# Patient Record
Sex: MALE | Race: WHITE | NOT HISPANIC OR LATINO | Employment: OTHER | URBAN - METROPOLITAN AREA
[De-identification: names, ages, dates, MRNs, and addresses within clinical notes are randomized per-mention and may not be internally consistent; named-entity substitution may affect disease eponyms.]

---

## 2014-11-21 LAB — EJECTION FRACTION: NORMAL %

## 2017-01-11 ENCOUNTER — TRANSFERRED RECORDS (OUTPATIENT)
Dept: HEALTH INFORMATION MANAGEMENT | Facility: CLINIC | Age: 52
End: 2017-01-11

## 2017-04-10 DIAGNOSIS — I10 ESSENTIAL HYPERTENSION WITH GOAL BLOOD PRESSURE LESS THAN 140/90: ICD-10-CM

## 2017-04-10 DIAGNOSIS — R10.13 ABDOMINAL PAIN, EPIGASTRIC: ICD-10-CM

## 2017-04-10 RX ORDER — NICOTINE POLACRILEX 4 MG/1
20 GUM, CHEWING ORAL DAILY
Qty: 90 TABLET | Refills: 3 | Status: CANCELLED | OUTPATIENT
Start: 2017-04-10

## 2017-04-10 NOTE — TELEPHONE ENCOUNTER
Omeprazole  20 mg, # 90 with 3 refills last given 6/22/16  .  Doxazosin  4 mg, # 90 with 1 refills last given 12/14/2016.    Maddie Stone, CMA

## 2017-04-11 ENCOUNTER — TELEPHONE (OUTPATIENT)
Dept: FAMILY MEDICINE | Facility: CLINIC | Age: 52
End: 2017-04-11

## 2017-04-11 DIAGNOSIS — R10.13 ABDOMINAL PAIN, EPIGASTRIC: ICD-10-CM

## 2017-04-11 DIAGNOSIS — K21.9 GASTROESOPHAGEAL REFLUX DISEASE WITHOUT ESOPHAGITIS: Primary | ICD-10-CM

## 2017-04-11 NOTE — TELEPHONE ENCOUNTER
Omeprazole exceeds the 365 day max of #90.  Please complete a prior auth.  Ph: [phone] I.D. None given.  Niurka Salinas M.A.

## 2017-04-12 RX ORDER — NICOTINE POLACRILEX 4 MG/1
20 GUM, CHEWING ORAL DAILY
Qty: 90 TABLET | Refills: 0 | Status: SHIPPED | OUTPATIENT
Start: 2017-04-12 | End: 2017-06-13

## 2017-04-12 RX ORDER — DOXAZOSIN 4 MG/1
4 TABLET ORAL AT BEDTIME
Qty: 30 TABLET | Refills: 0 | Status: SHIPPED | OUTPATIENT
Start: 2017-04-12 | End: 2017-05-09

## 2017-04-12 NOTE — TELEPHONE ENCOUNTER
Notified Adriana of message below she will talk to patient and call me back to schedule./Joelle Hernandez,

## 2017-04-12 NOTE — TELEPHONE ENCOUNTER
No, the chart was review(ed) and the patient never followed up as Dr Mo told him to do to discuss the h pylori. Please have the patient make an appointment(s) to discuss this.  Hussein Sanches MD

## 2017-05-09 DIAGNOSIS — G43.809 OTHER TYPE OF MIGRAINE: ICD-10-CM

## 2017-05-09 DIAGNOSIS — I10 ESSENTIAL HYPERTENSION WITH GOAL BLOOD PRESSURE LESS THAN 140/90: ICD-10-CM

## 2017-05-09 DIAGNOSIS — E55.9 VITAMIN D DEFICIENCY DISEASE: ICD-10-CM

## 2017-05-10 RX ORDER — CHOLECALCIFEROL (VITAMIN D3) 50 MCG
2000 TABLET ORAL DAILY
Qty: 90 TABLET | Refills: 3 | Status: SHIPPED | OUTPATIENT
Start: 2017-05-10 | End: 2018-06-03

## 2017-05-10 RX ORDER — SUMATRIPTAN 50 MG/1
50 TABLET, FILM COATED ORAL
Qty: 27 TABLET | Refills: 3 | Status: SHIPPED | OUTPATIENT
Start: 2017-05-10 | End: 2018-07-07

## 2017-05-10 RX ORDER — DOXAZOSIN 4 MG/1
TABLET ORAL
Qty: 30 TABLET | Refills: 0 | Status: SHIPPED | OUTPATIENT
Start: 2017-05-10 | End: 2017-06-01

## 2017-06-01 DIAGNOSIS — I10 ESSENTIAL HYPERTENSION WITH GOAL BLOOD PRESSURE LESS THAN 140/90: ICD-10-CM

## 2017-06-01 DIAGNOSIS — R60.0 PEDAL EDEMA: ICD-10-CM

## 2017-06-01 NOTE — LETTER
[facility]                                               [address] [place]  [address]    June 2, 2017    Loy Worthington  [address] [address] [address]    Dear Loy,       We recently received a refill request for doxazosin, furosemide, metoprolol and lisinopril.  We have refilled this for a one time 30 day supply only because you are due for a:     Blood pressure office visit and fasting lab appointment        Please call at your earliest convenience so that there will not be a delay with your future refills.        Thank you,   Your [facility] Care Team/isabella  [phone]

## 2017-06-02 RX ORDER — LISINOPRIL 40 MG/1
40 TABLET ORAL DAILY
Qty: 30 TABLET | Refills: 0 | Status: SHIPPED | OUTPATIENT
Start: 2017-06-02 | End: 2017-06-13

## 2017-06-02 RX ORDER — DOXAZOSIN 4 MG/1
4 TABLET ORAL AT BEDTIME
Qty: 30 TABLET | Refills: 0 | Status: SHIPPED | OUTPATIENT
Start: 2017-06-02 | End: 2017-06-13

## 2017-06-02 RX ORDER — FUROSEMIDE 40 MG
40 TABLET ORAL EVERY MORNING
Qty: 30 TABLET | Refills: 0 | Status: SHIPPED | OUTPATIENT
Start: 2017-06-02 | End: 2017-06-13

## 2017-06-02 RX ORDER — METOPROLOL SUCCINATE 100 MG/1
100 TABLET, EXTENDED RELEASE ORAL DAILY
Qty: 30 TABLET | Refills: 0 | Status: SHIPPED | OUTPATIENT
Start: 2017-06-02 | End: 2017-06-13

## 2017-06-02 NOTE — TELEPHONE ENCOUNTER
Pt due for fasting lab appointment and ov for hypertension for further refills.  Laura Rodriguez RN

## 2017-06-13 ENCOUNTER — OFFICE VISIT (OUTPATIENT)
Dept: FAMILY MEDICINE | Facility: CLINIC | Age: 52
End: 2017-06-13

## 2017-06-13 VITALS
OXYGEN SATURATION: 97 % | HEART RATE: 66 BPM | BODY MASS INDEX: 38.8 KG/M2 | DIASTOLIC BLOOD PRESSURE: 83 MMHG | TEMPERATURE: 98.3 F | HEIGHT: 70 IN | WEIGHT: 271 LBS | SYSTOLIC BLOOD PRESSURE: 125 MMHG

## 2017-06-13 DIAGNOSIS — E55.9 VITAMIN D DEFICIENCY: Primary | ICD-10-CM

## 2017-06-13 DIAGNOSIS — K21.9 GASTROESOPHAGEAL REFLUX DISEASE, ESOPHAGITIS PRESENCE NOT SPECIFIED: ICD-10-CM

## 2017-06-13 DIAGNOSIS — R10.13 ABDOMINAL PAIN, EPIGASTRIC: ICD-10-CM

## 2017-06-13 DIAGNOSIS — E78.5 HYPERLIPIDEMIA LDL GOAL <130: ICD-10-CM

## 2017-06-13 DIAGNOSIS — R60.0 PEDAL EDEMA: ICD-10-CM

## 2017-06-13 DIAGNOSIS — I10 ESSENTIAL HYPERTENSION WITH GOAL BLOOD PRESSURE LESS THAN 140/90: ICD-10-CM

## 2017-06-13 PROCEDURE — 99214 OFFICE O/P EST MOD 30 MIN: CPT | Performed by: FAMILY MEDICINE

## 2017-06-13 RX ORDER — FUROSEMIDE 40 MG
40 TABLET ORAL EVERY MORNING
Qty: 90 TABLET | Refills: 1 | Status: SHIPPED | OUTPATIENT
Start: 2017-06-13 | End: 2017-06-13

## 2017-06-13 RX ORDER — DOXAZOSIN 4 MG/1
4 TABLET ORAL AT BEDTIME
Qty: 90 TABLET | Refills: 3 | Status: SHIPPED | OUTPATIENT
Start: 2017-06-13 | End: 2017-06-13

## 2017-06-13 RX ORDER — FUROSEMIDE 40 MG
40 TABLET ORAL EVERY MORNING
Qty: 90 TABLET | Refills: 1 | Status: SHIPPED | OUTPATIENT
Start: 2017-06-13 | End: 2018-01-08

## 2017-06-13 RX ORDER — OMEPRAZOLE 40 MG/1
40 CAPSULE, DELAYED RELEASE ORAL DAILY
Qty: 90 CAPSULE | Refills: 3 | Status: SHIPPED | OUTPATIENT
Start: 2017-06-13 | End: 2017-06-13

## 2017-06-13 RX ORDER — DOXAZOSIN 4 MG/1
4 TABLET ORAL AT BEDTIME
Qty: 90 TABLET | Refills: 3 | Status: SHIPPED | OUTPATIENT
Start: 2017-06-13 | End: 2018-01-08

## 2017-06-13 RX ORDER — OMEPRAZOLE 40 MG/1
40 CAPSULE, DELAYED RELEASE ORAL DAILY
Qty: 90 CAPSULE | Refills: 3 | Status: SHIPPED | OUTPATIENT
Start: 2017-06-13 | End: 2018-01-30

## 2017-06-13 RX ORDER — METOPROLOL SUCCINATE 100 MG/1
100 TABLET, EXTENDED RELEASE ORAL DAILY
Qty: 90 TABLET | Refills: 3 | Status: SHIPPED | OUTPATIENT
Start: 2017-06-13 | End: 2018-01-30

## 2017-06-13 RX ORDER — LISINOPRIL 40 MG/1
40 TABLET ORAL DAILY
Qty: 90 TABLET | Refills: 3 | Status: SHIPPED | OUTPATIENT
Start: 2017-06-13 | End: 2018-01-30

## 2017-06-13 RX ORDER — LISINOPRIL 40 MG/1
40 TABLET ORAL DAILY
Qty: 90 TABLET | Refills: 3 | Status: SHIPPED | OUTPATIENT
Start: 2017-06-13 | End: 2017-06-13

## 2017-06-13 RX ORDER — METOPROLOL SUCCINATE 100 MG/1
100 TABLET, EXTENDED RELEASE ORAL DAILY
Qty: 90 TABLET | Refills: 3 | Status: SHIPPED | OUTPATIENT
Start: 2017-06-13 | End: 2017-06-13

## 2017-06-13 NOTE — MR AVS SNAPSHOT
After Visit Summary   6/13/2017    Loy Worthington    MRN: [id number]           Patient Information     Date Of Birth          1965        Visit Information        Provider Department      6/13/2017 10:45 AM Hussein Sanches MD; [employer] [facility]        Today's Diagnoses     Vitamin D deficiency    -  1    Essential hypertension with goal blood pressure less than 140/90        Pedal edema        Abdominal pain, epigastric        Gastroesophageal reflux disease, esophagitis presence not specified        Hyperlipidemia LDL goal <130          Care Instructions    Please schedule a future laboratory appointment(s) and be sure to have fasted for 10 hours prior to arrival. Start taking your Vitamin D again and get the blood tests about a week after that.             Follow-ups after your visit        Follow-up notes from your care team     Return in about 1 month (around 7/13/2017) for recheck on Acid Reflux.      Future tests that were ordered for you today     Open Future Orders        Priority Expected Expires Ordered    25 Hydroxyvitamin D2 and D3 Routine 6/17/2017 8/13/2017 6/13/2017    Lipid panel reflex to direct LDL Routine 6/17/2017 8/13/2017 6/13/2017    Comprehensive metabolic panel Routine 6/17/2017 8/13/2017 6/13/2017            Who to contact     If you have questions or need follow up information about today's clinic visit or your schedule please contact [facility] directly at [phone].  Normal or non-critical lab and imaging results will be communicated to you by MyChart, letter or phone within 4 business days after the clinic has received the results. If you do not hear from us within 7 days, please contact the clinic through MyChart or phone. If you have a critical or abnormal lab result, we will notify you by phone as soon as possible.  Submit refill requests through [employer] or call your pharmacy and they will forward the refill  "request to us. Please allow 3 business days for your refill to be completed.          Additional Information About Your Visit        [employer] Information     [employer] lets you send messages to your doctor, view your test results, renew your prescriptions, schedule appointments and more. To sign up, go to [email or web address] . Click on \"Log in\" on the left side of the screen, which will take you to the Welcome page. Then click on \"Sign up Now\" on the right side of the page.     You will be asked to enter the access code listed below, as well as some personal information. Please follow the directions to create your username and password.     Your access code is: [email or web address]  Expires: 2017 11:54 AM     Your access code will  in 90 days. If you need help or a new code, please call your [place] clinic or [phone].        Care EveryWhere ID     This is your Care EveryWhere ID. This could be used by other organizations to access your [place] medical records  [id number]        Your Vitals Were     Pulse Temperature Height Pulse Oximetry BMI (Body Mass Index)       66 98.3  F (36.8  C) (Oral) 5' 10\" (1.778 m) 97% 38.88 kg/m2        Blood Pressure from Last 3 Encounters:   17 125/83   16 125/76   16 146/90    Weight from Last 3 Encounters:   17 271 lb (122.9 kg)   16 269 lb 6.4 oz (122.2 kg)   16 271 lb (122.9 kg)                 Today's Medication Changes          These changes are accurate as of: 17 11:54 AM.  If you have any questions, ask your nurse or doctor.               Start taking these medicines.        Dose/Directions    omeprazole 40 MG capsule   Commonly known as:  priLOSEC   Used for:  Abdominal pain, epigastric, Essential hypertension with goal blood pressure less than 140/90, Pedal edema   Replaces:  omeprazole 20 MG tablet   Started by:  Hussein Sanches MD        Dose:  40 mg   Take 1 capsule (40 mg) by mouth daily Take 30-60 minutes before a " meal.   Quantity:  90 capsule   Refills:  3         Stop taking these medicines if you haven't already. Please contact your care team if you have questions.     omeprazole 20 MG tablet   Replaced by:  omeprazole 40 MG capsule   Stopped by:  Hussein Sanches MD                Where to get your medicines      These medications were sent to [id number] - [place], [place] - [address] [address]  [address], [place] [address]    Hours:  24-hours Phone:  [phone]     doxazosin 4 MG tablet    furosemide 40 MG tablet    lisinopril 40 MG tablet    metoprolol 100 MG 24 hr tablet    omeprazole 40 MG capsule                Primary Care Provider Office Phone # Fax #    Hussein Sanches [phone]       [facility] [address] [address] [address]        Thank you!     Thank you for choosing [facility]  for your care. Our goal is always to provide you with excellent care. Hearing back from our patients is one way we can continue to improve our services. Please take a few minutes to complete the written survey that you may receive in the mail after your visit with us. Thank you!             Your Updated Medication List - Protect others around you: Learn how to safely use, store and throw away your medicines at www.disposemymeds.org.          This list is accurate as of: 6/13/17 11:54 AM.  Always use your most recent med list.                   Brand Name Dispense Instructions for use    Carboxymeth-Glycerin-Polysorb 0.5-1-0.5 % Soln    REFRESH OPTIVE ADVANCED     Apply 1 drop to eye 2 times daily       doxazosin 4 MG tablet    CARDURA    90 tablet    Take 1 tablet (4 mg) by mouth At Bedtime       flecainide 100 MG tablet    TAMBOCOR     Take 100 mg by mouth 2 times daily       furosemide 40 MG tablet    LASIX    90 tablet    Take 1 tablet (40 mg) by mouth every morning       lisinopril 40 MG tablet     PRINIVIL/ZESTRIL    90 tablet    Take 1 tablet (40 mg) by mouth daily       metoprolol 100 MG 24 hr tablet    TOPROL-XL    90 tablet    Take 1 tablet (100 mg) by mouth daily       MULTAQ 400 MG Tabs tablet   Generic drug:  dronedarone          omeprazole 40 MG capsule    priLOSEC    90 capsule    Take 1 capsule (40 mg) by mouth daily Take 30-60 minutes before a meal.       rivaroxaban ANTICOAGULANT 20 MG Tabs tablet    XARELTO    90 tablet    Take 1 tablet (20 mg) by mouth daily (with dinner)       SUMAtriptan 50 MG tablet    IMITREX    27 tablet    Take 1 tablet (50 mg) by mouth at onset of headache May repeat in 2 hours if needed: max 2/day;       vitamin D 2000 UNITS tablet     90 tablet    Take 2,000 Units by mouth daily

## 2017-06-13 NOTE — PROGRESS NOTES
SUBJECTIVE:                                                    Loy Worthington is a 52 year old male who presents to clinic today for the following health issues:        Recheck medications and refills  HTN      Problem list and histories reviewed & adjusted, as indicated.    Reviewed and updated as needed this visit by clinical staff  Tobacco  Allergies  Meds  Med Hx  Surg Hx  Fam Hx  Soc Hx      Reviewed and updated as needed this visit by Provider       --------------------------------------------------------------------------------------------------------------------------------------    SUBJECTIVE:  Loy Worthington is an 52 year old male who presents for a follow up evaluation of his hypertension. The patient had the dose of lisinopril  raised to 40 mg and the lassox was increaed byu his cardiology at the last visit. The patient reports that he IS taking the medication as prescribed. He denies side effects of medication.    Patient Active Problem List    Diagnosis     Brain injury (H)     Renal mass     Migraine headache     CARDIOVASCULAR SCREENING; LDL GOAL LESS THAN 130     Hypertension goal BP (blood pressure) < 140/90     Bilateral corneal scars     High triglycerides     Familial hypoalphalipoproteinemia     Migraine     Vitamin D deficiency     Hyperlipidemia LDL goal <130     Vitamin D deficiency disease     GERD (gastroesophageal reflux disease)     Tubular adenoma of colon     Paroxysmal atrial fibrillation (H)     Hematospermia     Lung nodule, solitary, Needs chest CT in Jan 2017     S/P laparoscopic cholecystectomy     Pulmonary nodule, right lung base needs fu scan     HDL deficiency       Is the HYPERTENSION goal on the problem list? Yes      Use of agents associated with hypertension: none  Current Outpatient Prescriptions   Medication     lisinopril (PRINIVIL/ZESTRIL) 40 MG tablet     furosemide (LASIX) 40 MG tablet     metoprolol (TOPROL-XL) 100 MG 24 hr tablet     doxazosin (CARDURA) 4 MG  "tablet     Cholecalciferol (VITAMIN D) 2000 UNITS tablet     SUMAtriptan (IMITREX) 50 MG tablet     omeprazole 20 MG tablet     flecainide (TAMBOCOR) 100 MG tablet     rivaroxaban ANTICOAGULANT (XARELTO) 20 MG TABS tablet     MULTAQ 400 MG TABS tablet     Carboxymeth-Glycerin-Polysorb (REFRESH OPTIVE ADVANCED) 0.5-1-0.5 % SOLN     No current facility-administered medications for this visit.          Allergies   Allergen Reactions     Zocor [Simvastatin - High Dose] Swelling     Redness,itching,swelling         Social History   Substance Use Topics     Smoking status: Never Smoker     Smokeless tobacco: Never Used      Comment: Lives in smoke free household     Alcohol use No       OBJECTIVE:  /83  Pulse 66  Temp 98.3  F (36.8  C) (Oral)  Ht 5' 10\" (1.778 m)  Wt 271 lb (122.9 kg)  SpO2 97%  BMI 38.88 kg/m2    Heart: negative, PMI normal. No lifts, heaves, or thrills. RRR. No murmurs, clicks gallops or rub  Lower Extremities: +1 edema on right and +1 edema on the left shelter up the shins.         Results for orders placed or performed in visit on 06/15/16   Lipid Profile with reflex to direct LDL   Result Value Ref Range    Cholesterol 142 <200 mg/dL    Triglycerides 239 (H) <150 mg/dL    HDL Cholesterol 32 (L) >39 mg/dL    LDL Cholesterol Calculated 62 <100 mg/dL    Non HDL Cholesterol 110 <130 mg/dL   Comprehensive metabolic panel   Result Value Ref Range    Sodium 140 133 - 144 mmol/L    Potassium 3.8 3.4 - 5.3 mmol/L    Chloride 106 94 - 109 mmol/L    Carbon Dioxide 29 20 - 32 mmol/L    Anion Gap 5 3 - 14 mmol/L    Glucose 102 (H) 70 - 99 mg/dL    Urea Nitrogen 12 7 - 30 mg/dL    Creatinine 0.76 0.66 - 1.25 mg/dL    GFR Estimate >90  Non  GFR Calc   >60 mL/min/1.7m2    GFR Estimate If Black >90   GFR Calc   >60 mL/min/1.7m2    Calcium 8.5 8.5 - 10.1 mg/dL    Bilirubin Total 0.5 0.2 - 1.3 mg/dL    Albumin 4.0 3.4 - 5.0 g/dL    Protein Total 7.7 6.8 - 8.8 g/dL    Alkaline " Phosphatase 80 40 - 150 U/L    ALT 46 0 - 70 U/L    AST 25 0 - 45 U/L   25 Hydroxyvitamin D2 and D3   Result Value Ref Range    25 OH Vit D2 <5 ug/L    25 OH Vit D3 36 ug/L    25 OH Vit D total  20 - 75 ug/L     <41  Season, race, dietary intake, and treatment affect the concentration of   25-hydroxy-Vitamin D. Values may decrease during winter months and increase   during summer months. Values 20-29 ug/L may indicate Vitamin D insufficiency   and values <20 ug/L may indicate Vitamin D deficiency.   This test was developed and its performance characteristics determined by the   [facility],  Special Chemistry Laboratory. It has   not been cleared or approved by the FDA. The laboratory is regulated under CLIA   as qualified to perform high-complexity testing. This test is used for clinical   purposes. It should not be regarded as investigational or for research.         The 10-year ASCVD risk score (Rulodesiree TALLEY Jr, et al., 2013) is: 5.2%    Values used to calculate the score:      Age: 52 years      Sex: Male      Is Non- : No      Diabetic: No      Tobacco smoker: No      Systolic Blood Pressure: 125 mmHg      Is BP treated: Yes      HDL Cholesterol: 32 mg/dL      Total Cholesterol: 142 mg/dL    ASSESSMENT:  Essential hypertension which is well controlled.       Plan:  - Medication:continue the current doses of medication.  The patients antihypertensive medication was filled for 6months.    The patient was advised to do the following therapuetic life style changes  - Dietary sodium restriction and increase potassium and Calcium intake  - Regular aerobic exercise  - Weight loss  - Discontinue smoking if applicable  - Avoid regular NSAID use if applicable  - Avoid regular decongestant use if applicable  - Follow up in clinic in 6 months for a recheck  - Check a basic metabolic panel today    Patient Education: Reviewed risks of hypertension and principles of    Treatment.      --------------------------------------------------------------------------------------------------------------------------------------    SUBJECTIVE:  HPI: Loy Worthington is a 52 year old male who presents for follow up on symptoms of Acid reflux. The patient was started on 20 mg of omeprazole *. The patient has been taking the medication as prescribed. The patient denies any side effects from the medication.   Overall the patient reports that the symptoms are not well controlled.   He has a few days in a row  of symptom(s) in which he gets heartburn and nausea,   It bothers him 3-4 times a month(s).     Patient Active Problem List 06/13/2017 - Damian as Reviewed 06/13/2017   -- ZOCOR [SIMVASTATIN - HIGH DOSE] -- Swelling -- noted 07/22/2013      Current Outpatient Prescriptions:      lisinopril (PRINIVIL/ZESTRIL) 40 MG tablet, Take 1 tablet (40 mg) by mouth daily, Disp: 30 tablet, Rfl: 0     furosemide (LASIX) 40 MG tablet, Take 1 tablet (40 mg) by mouth every morning, Disp: 30 tablet, Rfl: 0     metoprolol (TOPROL-XL) 100 MG 24 hr tablet, Take 1 tablet (100 mg) by mouth daily, Disp: 30 tablet, Rfl: 0     doxazosin (CARDURA) 4 MG tablet, Take 1 tablet (4 mg) by mouth At Bedtime, Disp: 30 tablet, Rfl: 0     Cholecalciferol (VITAMIN D) 2000 UNITS tablet, Take 2,000 Units by mouth daily, Disp: 90 tablet, Rfl: 3     SUMAtriptan (IMITREX) 50 MG tablet, Take 1 tablet (50 mg) by mouth at onset of headache May repeat in 2 hours if needed: max 2/day;, Disp: 27 tablet, Rfl: 3     omeprazole 20 MG tablet, Take 1 tablet (20 mg) by mouth daily Take 30-60 minutes before a meal., Disp: 90 tablet, Rfl: 0     flecainide (TAMBOCOR) 100 MG tablet, Take 100 mg by mouth 2 times daily, Disp: , Rfl:      rivaroxaban ANTICOAGULANT (XARELTO) 20 MG TABS tablet, Take 1 tablet (20 mg) by mouth daily (with dinner), Disp: 90 tablet, Rfl: 1     MULTAQ 400 MG TABS tablet, , Disp: , Rfl:      Carboxymeth-Glycerin-Polysorb (REFRESH  "OPTIVE ADVANCED) 0.5-1-0.5 % SOLN, Apply 1 drop to eye 2 times daily, Disp: , Rfl:   Patient Active Problem List   Diagnosis     Brain injury (H)     Renal mass     Migraine headache     CARDIOVASCULAR SCREENING; LDL GOAL LESS THAN 130     Hypertension goal BP (blood pressure) < 140/90     Bilateral corneal scars     High triglycerides     Familial hypoalphalipoproteinemia     Migraine     Vitamin D deficiency     Hyperlipidemia LDL goal <130     Vitamin D deficiency disease     GERD (gastroesophageal reflux disease)     Tubular adenoma of colon     Paroxysmal atrial fibrillation (H)     Hematospermia     Lung nodule, solitary, Needs chest CT in Jan 2017     S/P laparoscopic cholecystectomy     Pulmonary nodule, right lung base needs fu scan     HDL deficiency         PHYSICAL EXAM:  /83  Pulse 66  Temp 98.3  F (36.8  C) (Oral)  Ht 5' 10\" (1.778 m)  Wt 271 lb (122.9 kg)  SpO2 97%  BMI 38.88 kg/m2   General: healthy, alert and no distress  HENT: Normocephalic. TM's grossly normal, oropharynx without significant findings.  Neck: supple, without thyromegaly or thyroid nodularity and without cervical or jugular adenopathy  Lungs: Clear to auscultation  Heart:NEGATIVE, PMI normal. No lifts, heaves, or thrills. RRR. No murmurs, clicks gallops or rub  Abdomen: bowel sounds normal,  no bruits heard, no organomegaly and soft, non-tender    ASSESSMENT / IMPRESSION:  1)Gastroesophageal Reflux Disease      PLAN:  Increase the dose of the omeprazole to 40 mg and follow up in 1-2 month(s)     Continue the current medication.  Continue current life style changes including: eat smaller, more frequent meals, last meal at least 3 hours before bedtime, avoid chocolate, citrus, alcohol, caffeine, and peppermint and if obese, lose weight  I did recommend that the patient make sure to get the recommended daily dose of calcium either through dietary sources or through supplement to compensate for the decreased calcium " "absorption seen in patients on long term PPI's.       --------------------------------------------------------------------------------------------------------------------------------------      SUBJECTIVE:  Loy Worthington is an 52 year old male who presents for a follow up evaluation of his Vitamin D deficiency.    He is takingt 2,000 iu daily but he has missed it for about 1 week..         Past Medical History:   Diagnosis Date     Arthritis     hand- joint pain     Bilateral corneal scars 4/7/2011     Brain injury (H)     fall from a great height     Cognitive deficits      Hematuria 03/19/2009     Hypercholesterolemia      Hypertension      Low back pain 03/19/2009     Pain in joint, forearm 11/11/2008     Renal cyst 04/06/2009     Trigger finger (acquired) 11/11/2008    right long       Current Outpatient Prescriptions   Medication     lisinopril (PRINIVIL/ZESTRIL) 40 MG tablet     furosemide (LASIX) 40 MG tablet     metoprolol (TOPROL-XL) 100 MG 24 hr tablet     doxazosin (CARDURA) 4 MG tablet     Cholecalciferol (VITAMIN D) 2000 UNITS tablet     SUMAtriptan (IMITREX) 50 MG tablet     omeprazole 20 MG tablet     flecainide (TAMBOCOR) 100 MG tablet     rivaroxaban ANTICOAGULANT (XARELTO) 20 MG TABS tablet     MULTAQ 400 MG TABS tablet     Carboxymeth-Glycerin-Polysorb (REFRESH OPTIVE ADVANCED) 0.5-1-0.5 % SOLN     No current facility-administered medications for this visit.        Allergies   Allergen Reactions     Zocor [Simvastatin - High Dose] Swelling     Redness,itching,swelling         Social History   Substance Use Topics     Smoking status: Never Smoker     Smokeless tobacco: Never Used      Comment: Lives in smoke free household     Alcohol use No       OBJECTIVE:  /83  Pulse 66  Temp 98.3  F (36.8  C) (Oral)  Ht 5' 10\" (1.778 m)  Wt 271 lb (122.9 kg)  SpO2 97%  BMI 38.88 kg/m2    Exam:  Constitutional: healthy, alert and no distress  Skin Color: white      ASSESSMENT:  Vitamin D " deficiency    Plan:  Patient Instructions   Please schedule a future laboratory appointment(s) and be sure to have fasted for 10 hours prior to arrival. Start taking your Vitamin D again and get the blood tests about a week after that.

## 2017-06-13 NOTE — PATIENT INSTRUCTIONS
Please schedule a future laboratory appointment(s) and be sure to have fasted for 10 hours prior to arrival. Start taking your Vitamin D again and get the blood tests about a week after that.

## 2017-06-13 NOTE — NURSING NOTE
"Chief Complaint   Patient presents with     Recheck Medication       Initial /87  Pulse 66  Temp 98.3  F (36.8  C) (Oral)  Ht 5' 10\" (1.778 m)  Wt 271 lb (122.9 kg)  SpO2 97%  BMI 38.88 kg/m2 Estimated body mass index is 38.88 kg/(m^2) as calculated from the following:    Height as of this encounter: 5' 10\" (1.778 m).    Weight as of this encounter: 271 lb (122.9 kg).  Medication Reconciliation: complete  Annamaria Mcdaniel M.A.    "

## 2017-12-28 ENCOUNTER — TELEPHONE (OUTPATIENT)
Dept: FAMILY MEDICINE | Facility: CLINIC | Age: 52
End: 2017-12-28

## 2017-12-28 DIAGNOSIS — I10 ESSENTIAL HYPERTENSION WITH GOAL BLOOD PRESSURE LESS THAN 140/90: ICD-10-CM

## 2017-12-28 DIAGNOSIS — R60.0 PEDAL EDEMA: ICD-10-CM

## 2017-12-28 DIAGNOSIS — E78.6 HDL DEFICIENCY: Primary | ICD-10-CM

## 2017-12-28 DIAGNOSIS — R10.13 ABDOMINAL PAIN, EPIGASTRIC: ICD-10-CM

## 2017-12-28 DIAGNOSIS — E55.9 VITAMIN D DEFICIENCY: ICD-10-CM

## 2017-12-28 DIAGNOSIS — I10 HYPERTENSION GOAL BP (BLOOD PRESSURE) < 140/90: ICD-10-CM

## 2017-12-28 DIAGNOSIS — E78.1 HIGH TRIGLYCERIDES: ICD-10-CM

## 2017-12-28 DIAGNOSIS — K21.9 GASTROESOPHAGEAL REFLUX DISEASE WITHOUT ESOPHAGITIS: ICD-10-CM

## 2018-01-02 NOTE — TELEPHONE ENCOUNTER
Routing refill request to provider for review/approval because:  Labs not current:  Potassium, creatinine, sodium.  BP Readings from Last 6 Encounters:   06/13/17 125/83   06/22/16 125/76   06/01/16 146/90   02/19/16 138/84   01/29/16 109/71   01/25/16 (!) 133/97     Please advise on refill.  Dalia Guevara RN

## 2018-01-03 NOTE — TELEPHONE ENCOUNTER
Please call the patient and make an appointment(s) for a complete physical exam and then you can refill the medication one time.  Hussein Sanches

## 2018-01-04 RX ORDER — FUROSEMIDE 40 MG
TABLET ORAL
OUTPATIENT
Start: 2018-01-04

## 2018-01-04 RX ORDER — DOXAZOSIN 4 MG/1
TABLET ORAL
OUTPATIENT
Start: 2018-01-04

## 2018-01-05 NOTE — TELEPHONE ENCOUNTER
Patient does not speak english well. Spoke with Son and informed him patient needs to make an appointment for Physical for Refill of medication.   ANA Florence/Veronika

## 2018-01-08 ENCOUNTER — TELEPHONE (OUTPATIENT)
Dept: FAMILY MEDICINE | Facility: CLINIC | Age: 53
End: 2018-01-08

## 2018-01-08 DIAGNOSIS — R60.0 PEDAL EDEMA: ICD-10-CM

## 2018-01-08 DIAGNOSIS — I10 ESSENTIAL HYPERTENSION WITH GOAL BLOOD PRESSURE LESS THAN 140/90: ICD-10-CM

## 2018-01-08 DIAGNOSIS — R10.13 ABDOMINAL PAIN, EPIGASTRIC: ICD-10-CM

## 2018-01-08 RX ORDER — FUROSEMIDE 40 MG
40 TABLET ORAL EVERY MORNING
Qty: 30 TABLET | Refills: 0 | Status: SHIPPED | OUTPATIENT
Start: 2018-01-08 | End: 2018-01-30

## 2018-01-08 RX ORDER — DOXAZOSIN 4 MG/1
4 TABLET ORAL AT BEDTIME
Qty: 30 TABLET | Refills: 0 | Status: SHIPPED | OUTPATIENT
Start: 2018-01-08 | End: 2018-01-30

## 2018-01-08 NOTE — TELEPHONE ENCOUNTER
30 day prescription sent to pharmacy.  Need to keep upcoming appointment for further refills  Dalia Guevara RN

## 2018-01-08 NOTE — TELEPHONE ENCOUNTER
RN please refill medications requested He has appointments scheduled now,see previous telephone message  ANA Florence/Veronika

## 2018-01-08 NOTE — TELEPHONE ENCOUNTER
calling patient has scheduled an appt for lab work on 1/22 and phy with Dr Sanches on 1/30. Patient out of his medication states all meds, needs refills called in to get patient by until seen. States ok to call number listed daughter can interpret for patient. Please call to advise.

## 2018-01-22 DIAGNOSIS — E78.6 HDL DEFICIENCY: ICD-10-CM

## 2018-01-22 DIAGNOSIS — E55.9 VITAMIN D DEFICIENCY: ICD-10-CM

## 2018-01-22 DIAGNOSIS — I10 HYPERTENSION GOAL BP (BLOOD PRESSURE) < 140/90: ICD-10-CM

## 2018-01-22 DIAGNOSIS — E78.1 HIGH TRIGLYCERIDES: ICD-10-CM

## 2018-01-22 DIAGNOSIS — R60.0 PEDAL EDEMA: ICD-10-CM

## 2018-01-22 DIAGNOSIS — R10.13 ABDOMINAL PAIN, EPIGASTRIC: ICD-10-CM

## 2018-01-22 DIAGNOSIS — I10 ESSENTIAL HYPERTENSION WITH GOAL BLOOD PRESSURE LESS THAN 140/90: ICD-10-CM

## 2018-01-22 DIAGNOSIS — K21.9 GASTROESOPHAGEAL REFLUX DISEASE WITHOUT ESOPHAGITIS: ICD-10-CM

## 2018-01-22 LAB
ALBUMIN SERPL-MCNC: 3.7 G/DL (ref 3.4–5)
ALP SERPL-CCNC: 79 U/L (ref 40–150)
ALT SERPL W P-5'-P-CCNC: 69 U/L (ref 0–70)
ANION GAP SERPL CALCULATED.3IONS-SCNC: 8 MMOL/L (ref 3–14)
AST SERPL W P-5'-P-CCNC: 30 U/L (ref 0–45)
BILIRUB SERPL-MCNC: 0.4 MG/DL (ref 0.2–1.3)
BUN SERPL-MCNC: 13 MG/DL (ref 7–30)
CALCIUM SERPL-MCNC: 8.7 MG/DL (ref 8.5–10.1)
CHLORIDE SERPL-SCNC: 110 MMOL/L (ref 94–109)
CHOLEST SERPL-MCNC: 164 MG/DL
CO2 SERPL-SCNC: 26 MMOL/L (ref 20–32)
CREAT SERPL-MCNC: 0.83 MG/DL (ref 0.66–1.25)
GFR SERPL CREATININE-BSD FRML MDRD: >90 ML/MIN/1.7M2
GLUCOSE SERPL-MCNC: 101 MG/DL (ref 70–99)
HDLC SERPL-MCNC: 35 MG/DL
LDLC SERPL CALC-MCNC: 92 MG/DL
NONHDLC SERPL-MCNC: 129 MG/DL
POTASSIUM SERPL-SCNC: 4.2 MMOL/L (ref 3.4–5.3)
PROT SERPL-MCNC: 7.5 G/DL (ref 6.8–8.8)
SODIUM SERPL-SCNC: 144 MMOL/L (ref 133–144)
TRIGL SERPL-MCNC: 184 MG/DL

## 2018-01-22 PROCEDURE — 82306 VITAMIN D 25 HYDROXY: CPT | Performed by: FAMILY MEDICINE

## 2018-01-22 PROCEDURE — 80061 LIPID PANEL: CPT | Performed by: FAMILY MEDICINE

## 2018-01-22 PROCEDURE — 36415 COLL VENOUS BLD VENIPUNCTURE: CPT | Performed by: FAMILY MEDICINE

## 2018-01-22 PROCEDURE — 80053 COMPREHEN METABOLIC PANEL: CPT | Performed by: FAMILY MEDICINE

## 2018-01-22 NOTE — PROGRESS NOTES
I have reviewed the schedule of future appointments and this patient has an appointment scheduled for 1-.    Visit date not found

## 2018-01-25 LAB
DEPRECATED CALCIDIOL+CALCIFEROL SERPL-MC: <36 UG/L (ref 20–75)
VITAMIN D2 SERPL-MCNC: <5 UG/L
VITAMIN D3 SERPL-MCNC: 31 UG/L

## 2018-01-30 ENCOUNTER — OFFICE VISIT (OUTPATIENT)
Dept: FAMILY MEDICINE | Facility: CLINIC | Age: 53
End: 2018-01-30
Payer: COMMERCIAL

## 2018-01-30 VITALS
DIASTOLIC BLOOD PRESSURE: 94 MMHG | SYSTOLIC BLOOD PRESSURE: 145 MMHG | TEMPERATURE: 98.7 F | HEIGHT: 70 IN | HEART RATE: 80 BPM | OXYGEN SATURATION: 97 % | BODY MASS INDEX: 39.22 KG/M2 | WEIGHT: 274 LBS

## 2018-01-30 DIAGNOSIS — I10 ESSENTIAL HYPERTENSION WITH GOAL BLOOD PRESSURE LESS THAN 140/90: ICD-10-CM

## 2018-01-30 DIAGNOSIS — R10.13 ABDOMINAL PAIN, EPIGASTRIC: ICD-10-CM

## 2018-01-30 DIAGNOSIS — Z80.42 FAMILY HISTORY OF PROSTATE CANCER IN FATHER: ICD-10-CM

## 2018-01-30 DIAGNOSIS — R10.13 EPIGASTRIC PAIN: ICD-10-CM

## 2018-01-30 DIAGNOSIS — I48.0 PAROXYSMAL ATRIAL FIBRILLATION (H): ICD-10-CM

## 2018-01-30 DIAGNOSIS — Z12.11 SPECIAL SCREENING FOR MALIGNANT NEOPLASMS, COLON: ICD-10-CM

## 2018-01-30 DIAGNOSIS — R60.0 PEDAL EDEMA: ICD-10-CM

## 2018-01-30 DIAGNOSIS — Z00.00 ROUTINE GENERAL MEDICAL EXAMINATION AT A HEALTH CARE FACILITY: Primary | ICD-10-CM

## 2018-01-30 LAB
ALBUMIN SERPL-MCNC: 4.1 G/DL (ref 3.4–5)
ALP SERPL-CCNC: 97 U/L (ref 40–150)
ALT SERPL W P-5'-P-CCNC: 64 U/L (ref 0–70)
AMYLASE SERPL-CCNC: 48 U/L (ref 30–110)
AST SERPL W P-5'-P-CCNC: 31 U/L (ref 0–45)
BILIRUB DIRECT SERPL-MCNC: 0.1 MG/DL (ref 0–0.2)
BILIRUB SERPL-MCNC: 0.5 MG/DL (ref 0.2–1.3)
ERYTHROCYTE [DISTWIDTH] IN BLOOD BY AUTOMATED COUNT: 14.1 % (ref 10–15)
HCT VFR BLD AUTO: 45.9 % (ref 40–53)
HGB BLD-MCNC: 15.3 G/DL (ref 13.3–17.7)
LIPASE SERPL-CCNC: 152 U/L (ref 73–393)
MCH RBC QN AUTO: 27 PG (ref 26.5–33)
MCHC RBC AUTO-ENTMCNC: 33.3 G/DL (ref 31.5–36.5)
MCV RBC AUTO: 81 FL (ref 78–100)
PLATELET # BLD AUTO: 174 10E9/L (ref 150–450)
PROT SERPL-MCNC: 8.3 G/DL (ref 6.8–8.8)
PSA SERPL-ACNC: 1.86 UG/L (ref 0–4)
RBC # BLD AUTO: 5.66 10E12/L (ref 4.4–5.9)
WBC # BLD AUTO: 4.9 10E9/L (ref 4–11)

## 2018-01-30 PROCEDURE — 99396 PREV VISIT EST AGE 40-64: CPT | Performed by: FAMILY MEDICINE

## 2018-01-30 PROCEDURE — 80076 HEPATIC FUNCTION PANEL: CPT | Performed by: FAMILY MEDICINE

## 2018-01-30 PROCEDURE — G0103 PSA SCREENING: HCPCS | Performed by: FAMILY MEDICINE

## 2018-01-30 PROCEDURE — 36415 COLL VENOUS BLD VENIPUNCTURE: CPT | Performed by: FAMILY MEDICINE

## 2018-01-30 PROCEDURE — 82150 ASSAY OF AMYLASE: CPT | Performed by: FAMILY MEDICINE

## 2018-01-30 PROCEDURE — 85027 COMPLETE CBC AUTOMATED: CPT | Performed by: FAMILY MEDICINE

## 2018-01-30 PROCEDURE — 83690 ASSAY OF LIPASE: CPT | Performed by: FAMILY MEDICINE

## 2018-01-30 RX ORDER — DOXAZOSIN 4 MG/1
4 TABLET ORAL AT BEDTIME
Qty: 90 TABLET | Refills: 3 | Status: SHIPPED | OUTPATIENT
Start: 2018-01-30 | End: 2019-02-07

## 2018-01-30 RX ORDER — LISINOPRIL 40 MG/1
40 TABLET ORAL DAILY
Qty: 90 TABLET | Refills: 3 | Status: SHIPPED | OUTPATIENT
Start: 2018-01-30 | End: 2019-02-07

## 2018-01-30 RX ORDER — METOPROLOL SUCCINATE 100 MG/1
100 TABLET, EXTENDED RELEASE ORAL DAILY
Qty: 90 TABLET | Refills: 3 | Status: SHIPPED | OUTPATIENT
Start: 2018-01-30 | End: 2019-02-07

## 2018-01-30 RX ORDER — FUROSEMIDE 40 MG
40 TABLET ORAL EVERY MORNING
Qty: 90 TABLET | Refills: 3 | Status: SHIPPED | OUTPATIENT
Start: 2018-01-30 | End: 2019-02-07

## 2018-01-30 RX ORDER — OMEPRAZOLE 40 MG/1
40 CAPSULE, DELAYED RELEASE ORAL DAILY
Qty: 90 CAPSULE | Refills: 3 | Status: SHIPPED | OUTPATIENT
Start: 2018-01-30 | End: 2019-02-07

## 2018-01-30 NOTE — PROGRESS NOTES
"SUBJECTIVE:   CC: Loy Worthington is an 52 year old male who presents for preventative health visit.     Physical   Annual:     Getting at least 3 servings of Calcium per day::  Yes    Bi-annual eye exam::  NO    Dental care twice a year::  Yes    Sleep apnea or symptoms of sleep apnea::  Excessive snoring    Diet::  Regular (no restrictions), Low salt and Low fat/cholesterol    Taking medications regularly::  No    Barriers to taking medications::  Not applicable    Additional concerns today::  No                    Today's PHQ-2 Score:   PHQ-2 ( 1999 [employer]) 1/30/2018   Q1: Little interest or pleasure in doing things 0   Q2: Feeling down, depressed or hopeless 0   PHQ-2 Score 0   Q1: Little interest or pleasure in doing things Not at all   Q2: Feeling down, depressed or hopeless Not at all   PHQ-2 Score 0       Abuse: Current or Past(Physical, Sexual or Emotional)- No  Do you feel safe in your environment - Yes    Social History   Substance Use Topics     Smoking status: Never Smoker     Smokeless tobacco: Never Used      Comment: Lives in smoke free household     Alcohol use No     Alcohol Use 1/30/2018   If you drink alcohol, do you typically have greater than 3 drinks per day OR greater than 7 drinks per week?   Not applicable   No flowsheet data found.    Last PSA:   PSA   Date Value Ref Range Status   01/30/2018 1.86 0 - 4 ug/L Final     Comment:     Assay Method:  Chemiluminescence using Siemens Vista analyzer       Reviewed orders with patient. Reviewed health maintenance and updated orders accordingly -       Reviewed and updated as needed this visit by clinical staff  Tobacco  Allergies  Meds  Med Hx  Surg Hx  Fam Hx  Soc Hx        Reviewed and updated as needed this visit by Provider            Review of Systems      OBJECTIVE:   BP (!) 145/94  Pulse 80  Temp 98.7  F (37.1  C) (Oral)  Ht 5' 10\" (1.778 m)  Wt 274 lb (124.3 kg)  SpO2 97%  BMI 39.31 kg/m2    Physical Exam      ASSESSMENT/PLAN:      " " ICD-10-CM    1. Routine general medical examination at a health care facility Z00.00    2. Pedal edema R60.0 furosemide (LASIX) 40 MG tablet     doxazosin (CARDURA) 4 MG tablet     metoprolol succinate (TOPROL-XL) 100 MG 24 hr tablet     lisinopril (PRINIVIL/ZESTRIL) 40 MG tablet     omeprazole (PRILOSEC) 40 MG capsule   3. Essential hypertension with goal blood pressure less than 140/90 I10 furosemide (LASIX) 40 MG tablet     doxazosin (CARDURA) 4 MG tablet     metoprolol succinate (TOPROL-XL) 100 MG 24 hr tablet     lisinopril (PRINIVIL/ZESTRIL) 40 MG tablet     omeprazole (PRILOSEC) 40 MG capsule   4. Abdominal pain, epigastric R10.13 furosemide (LASIX) 40 MG tablet     doxazosin (CARDURA) 4 MG tablet     metoprolol succinate (TOPROL-XL) 100 MG 24 hr tablet     lisinopril (PRINIVIL/ZESTRIL) 40 MG tablet     omeprazole (PRILOSEC) 40 MG capsule   5. Paroxysmal atrial fibrillation (H) I48.0    6. Epigastric pain R10.13 H Pylori antigen stool     Amylase     CBC with platelets     Hepatic panel     Lipase   7. Special screening for malignant neoplasms, colon Z12.11 GASTROENTEROLOGY ADULT REF PROCEDURE ONLY   8. Family history of prostate cancer in father Z80.42 Prostate spec antigen screen       COUNSELING:   Reviewed preventive health counseling, as reflected in patient instructions       Regular exercise       Healthy diet/nutrition       Vision screening       Family planning       Consider Hep C screening for patients born between 1945 and 1965       Colon cancer screening       Osteoporosis Prevention/Bone Health         reports that he has never smoked. He has never used smokeless tobacco.    Estimated body mass index is 39.31 kg/(m^2) as calculated from the following:    Height as of this encounter: 5' 10\" (1.778 m).    Weight as of this encounter: 274 lb (124.3 kg).   Weight management plan: Discussed healthy diet and exercise guidelines and patient will follow up in 12 months in clinic to " re-evaluate.    Counseling Resources:  ATP IV Guidelines  Pooled Cohorts Equation Calculator  FRAX Risk Assessment  ICSI Preventive Guidelines  Dietary Guidelines for Americans, 2010  USDA's MyPlate  ASA Prophylaxis  Lung CA Screening    Hussein Sanches MD  [facility]  Answers for HPI/ROS submitted by the patient on 1/30/2018   PHQ-2 Score: 0  --------------------------------------------------------------------------------------------------------------------------------------    SUBJECTIVE:  Loy Worthington is a 52 year old male who presents to the clinic today for a routine physical exam.    The patient's last physical was 4 years ago.     Cholesterol   Date Value Ref Range Status   01/22/2018 164 <200 mg/dL Final   06/15/2016 142 <200 mg/dL Final     HDL Cholesterol   Date Value Ref Range Status   01/22/2018 35 (L) >39 mg/dL Final   06/15/2016 32 (L) >39 mg/dL Final     LDL Cholesterol Calculated   Date Value Ref Range Status   01/22/2018 92 <100 mg/dL Final     Comment:     Desirable:       <100 mg/dl   06/15/2016 62 <100 mg/dL Final     Comment:     Desirable:       <100 mg/dl     Triglycerides   Date Value Ref Range Status   01/22/2018 184 (H) <150 mg/dL Final     Comment:     Borderline high:  150-199 mg/dl  High:             200-499 mg/dl  Very high:       >499 mg/dl  Fasting specimen     06/15/2016 239 (H) <150 mg/dL Final     Comment:     Borderline high:  150-199 mg/dl   High:             200-499 mg/dl   Very high:       >499 mg/dl   Fasting specimen       Cholesterol/HDL Ratio   Date Value Ref Range Status   04/08/2015 5.3 (H) 0.0 - 5.0 Final   09/18/2014 5.4 (H) 0.0 - 5.0 Final     The patient's last fasting lipid panel was done 12 days ago and the results are listed above.    He is working out at [employer] but not frequently right now as he is ice fishing frequently.  He is avoiding simple carbs           The 10-year ASCVD risk score ([place] GERRI Jr, et al., 2013) is: 7.4%    Values used to  calculate the score:      Age: 52 years      Sex: Male      Is Non- : No      Diabetic: No      Tobacco smoker: No      Systolic Blood Pressure: 145 mmHg      Is BP treated: Yes      HDL Cholesterol: 35 mg/dL      Total Cholesterol: 164 mg/dL        The patient reports that he has been treated for high blood pressure. He got some bad news last night and I rather upset. He did not sleep well and is worried that may be the cause of his elevated blood pressure     The patient reports that he does not  take a daily aspirin as he takes Xarelto. He sees cardiology and has an appointment(s) in one month(s)     Lab Results   Component Value Date    HCVAB Negative 03/18/2014     The patient reports that he has been screened for Hepatitis C    (Screen all baby boomers once per CDC-- the generation born from 1945 through 1965)    Immunization History   Administered Date(s) Administered     HepB 10/15/2001, 11/15/2001, 11/28/2012     Influenza (IIV3) PF 11/10/2008     Mantoux Tuberculin Skin Test 05/15/1986, 10/15/2001     TD (ADULT, 7+) 09/25/2001, 11/15/2001     TDAP Vaccine (Adacel) 09/10/2012     The patient's believes that his last tetanus shot was given 6 year(s) ago.   The patient believes that he has not had a Zostavax in the past  The patient believes that he has not had a PPSV23 in the past.  The patient believes that he has not had a PCV13 in the past.  The patient believes that he has not had a seasonal flu vaccination this fall or winter.  The patient would like to have a no vaccinations today      No results found for this or any previous visit.]   The patient denies a family history of colon cancer.  The patient reports that he has had a colonoscopy. His  last colonoscopy was in 9-2013 and he  report that is was abnormal. The patient was told to have this repeated in 5 years.      He is not using contraception. His wife is his age and he is not concerned. He is not interested in a  vasectomy in the near future.    The patient denies a family history of diabetes.  The patient reports a family history of prostate cancer in his father.  The patient reports that he eats or drinks 2-3 servings of dairy products per day. He does not take a calcium supplement at all.  The patient reports that he has dental appointments approximately every 6 months.  The patient reports that he  has an eye examination approximately every 2 year(s).    Do you currently smoke? No  How many years have you smoked? 0   How many packs per day did you smoke on average? N/A  (if more than 30 pack year history and the patient is age 55-80 consider ordering an annual low dose radiation lung CT to screen for cancer)  (Do not order if patient has quit more than 15 years ago or has a health condition that limits life expectancy or could not tolerate curative lung surgery)  Are you interested having a lung CT to screen for lung cancer? N/A    If the patient has smoked more that 100 cigarettes, has the patient had an imaging study (US or CT) for an AAA between the ages of 65 and 75? N/A            Patient Active Problem List   Diagnosis     Brain injury (H)     Renal mass     Migraine headache     CARDIOVASCULAR SCREENING; LDL GOAL LESS THAN 130     Hypertension goal BP (blood pressure) < 140/90     Bilateral corneal scars     High triglycerides     Familial hypoalphalipoproteinemia     Migraine     Vitamin D deficiency     Hyperlipidemia LDL goal <130     Vitamin D deficiency disease     GERD (gastroesophageal reflux disease)     Tubular adenoma of colon     Paroxysmal atrial fibrillation (H)     Hematospermia     Lung nodule, solitary, Needs chest CT in Jan 2017     S/P laparoscopic cholecystectomy     Pulmonary nodule, right lung base needs fu scan     HDL deficiency       Past Surgical History:   Procedure Laterality Date     CHOLECYSTECTOMY       ENDOSCOPY  12/12/2005    upper GI endoscopy     ESOPHAGOSCOPY, GASTROSCOPY,  DUODENOSCOPY (EGD), COMBINED N/A 1/25/2016    Procedure: COMBINED ESOPHAGOSCOPY, GASTROSCOPY, DUODENOSCOPY (EGD);  Surgeon: David Campos MD;  Location: [facility] OR     ESOPHAGOSCOPY, GASTROSCOPY, DUODENOSCOPY (EGD), COMBINED N/A 1/25/2016    Procedure: COMBINED ESOPHAGOSCOPY, GASTROSCOPY, DUODENOSCOPY (EGD), BIOPSY SINGLE OR MULTIPLE;  Surgeon: David Campos MD;  Location: [facility] OR     H ABLATION ATRIAL FLUTTER  May 2016    Dr Cardenas at [facility]     NASAL/SINUS POLYPECTOMY      Nasal-Sinus Polypectomy     SURGICAL HISTORY OF -       cale holes, subdural due to fall     SURGICAL HISTORY OF -       ?tympanoplasty/mastoid with brain trauma       Family History   Problem Relation Age of Onset     Hypertension Father      Hypertension Brother      Macular Degeneration No family hx of      Glaucoma No family hx of      Thyroid Disease No family hx of      CANCER No family hx of      DIABETES No family hx of      CEREBROVASCULAR DISEASE No family hx of        Social History     Social History     Marital status:      Spouse name: N/A     Number of children: N/A     Years of education: N/A     Occupational History     Not on file.     Social History Main Topics     Smoking status: Never Smoker     Smokeless tobacco: Never Used      Comment: Lives in smoke free household     Alcohol use No     Drug use: No     Sexual activity: Yes     Partners: Female     Other Topics Concern     Not on file     Social History Narrative       Current Outpatient Prescriptions   Medication Sig Dispense Refill     furosemide (LASIX) 40 MG tablet Take 1 tablet (40 mg) by mouth every morning Need to keep upcoming appointment for further refills 90 tablet 3     doxazosin (CARDURA) 4 MG tablet Take 1 tablet (4 mg) by mouth At Bedtime Need to keep upcoming appointment for further refills 90 tablet 3     metoprolol succinate (TOPROL-XL) 100 MG 24 hr tablet Take 1 tablet (100 mg) by mouth daily 90 tablet 3     lisinopril  "(PRINIVIL/ZESTRIL) 40 MG tablet Take 1 tablet (40 mg) by mouth daily 90 tablet 3     omeprazole (PRILOSEC) 40 MG capsule Take 1 capsule (40 mg) by mouth daily Take 30-60 minutes before a meal. 90 capsule 3     Cholecalciferol (VITAMIN D) 2000 UNITS tablet Take 2,000 Units by mouth daily 90 tablet 3     SUMAtriptan (IMITREX) 50 MG tablet Take 1 tablet (50 mg) by mouth at onset of headache May repeat in 2 hours if needed: max 2/day; 27 tablet 3     rivaroxaban ANTICOAGULANT (XARELTO) 20 MG TABS tablet Take 1 tablet (20 mg) by mouth daily (with dinner) 90 tablet 1     MULTAQ 400 MG TABS tablet        Carboxymeth-Glycerin-Polysorb (REFRESH OPTIVE ADVANCED) 0.5-1-0.5 % SOLN Apply 1 drop to eye 2 times daily         PHYSICAL EXAMINATION:  Blood pressure (!) 145/94, pulse 80, temperature 98.7  F (37.1  C), temperature source Oral, height 5' 10\" (1.778 m), weight 274 lb (124.3 kg), SpO2 97 %.  General appearance - healthy, alert and no distress  Skin - Skin color, texture, turgor normal. No rashes or lesions.  Head - Normocephalic. No masses, lesions, tenderness or abnormalities  Eyes - conjunctivae/corneas clear. PERRL, EOM's intact. Fundi benign  Ears - External ears normal. Canals clear. TM's normal.  Nose/Sinuses - Nares normal. Septum midline. Mucosa normal. No drainage or sinus tenderness.  Oropharynx - Lips, mucosa, and tongue normal. Teeth and gums normal.  Neck - Neck supple. No adenopathy. Thyroid symmetric, normal size,  Lungs - Percussion normal. Good diaphragmatic excursion. Lungs clear  Heart - PMI normal. No lifts, heaves, or thrills. RRR. No murmurs, clicks gallops or rub  Abdomen - Abdomen soft, non-tender. BS normal. No masses, organomegaly  Extremities - Extremities normal. No deformities, edema, or skin discoloration.  Musculoskeletal - Spine ROM normal. Muscular strength intact.  Peripheral pulses - radial=4/4, femoral=4/4, popliteal=4/4, dorsalis pedis=4/4,  Neuro - Gait normal. Reflexes normal and " symmetric. Sensation grossly WNL.  Genitalia - Penis normal. No urethral discharge. Scrotum normal to palpation. No hernia.  Rectal - Rectal negative. Prostate palpation negative.  No rectal masses or abnormalities      Orders Only on 01/22/2018   Component Date Value Ref Range Status     Cholesterol 01/22/2018 164  <200 mg/dL Final     Triglycerides 01/22/2018 184* <150 mg/dL Final    Comment: Borderline high:  150-199 mg/dl  High:             200-499 mg/dl  Very high:       >499 mg/dl  Fasting specimen       HDL Cholesterol 01/22/2018 35* >39 mg/dL Final     LDL Cholesterol Calculated 01/22/2018 92  <100 mg/dL Final    Desirable:       <100 mg/dl     Non HDL Cholesterol 01/22/2018 129  <130 mg/dL Final     Sodium 01/22/2018 144  133 - 144 mmol/L Final     Potassium 01/22/2018 4.2  3.4 - 5.3 mmol/L Final     Chloride 01/22/2018 110* 94 - 109 mmol/L Final     Carbon Dioxide 01/22/2018 26  20 - 32 mmol/L Final     Anion Gap 01/22/2018 8  3 - 14 mmol/L Final     Glucose 01/22/2018 101* 70 - 99 mg/dL Final    Fasting specimen     Urea Nitrogen 01/22/2018 13  7 - 30 mg/dL Final     Creatinine 01/22/2018 0.83  0.66 - 1.25 mg/dL Final     GFR Estimate 01/22/2018 >90  >60 mL/min/1.7m2 Final    Non  GFR Calc     GFR Estimate If Black 01/22/2018 >90  >60 mL/min/1.7m2 Final    African American GFR Calc     Calcium 01/22/2018 8.7  8.5 - 10.1 mg/dL Final     Bilirubin Total 01/22/2018 0.4  0.2 - 1.3 mg/dL Final     Albumin 01/22/2018 3.7  3.4 - 5.0 g/dL Final     Protein Total 01/22/2018 7.5  6.8 - 8.8 g/dL Final     Alkaline Phosphatase 01/22/2018 79  40 - 150 U/L Final     ALT 01/22/2018 69  0 - 70 U/L Final     AST 01/22/2018 30  0 - 45 U/L Final     25 OH Vit D2 01/22/2018 <5  ug/L Final     25 OH Vit D3 01/22/2018 31  ug/L Final     25 OH Vit D total 01/22/2018 <36  20 - 75 ug/L Final    Comment: Season, race, dietary intake, and treatment affect the concentration of   25-hydroxy-Vitamin D. Values may  decrease during winter months and increase   during summer months. Values 20-29 ug/L may indicate Vitamin D insufficiency   and values <20 ug/L may indicate Vitamin D deficiency.  This test was developed and its performance characteristics determined by the   [facility],  Special Chemistry Laboratory. It has   not been cleared or approved by the FDA. The laboratory is regulated under   CLIA as qualified to perform high-complexity testing. This test is used for   clinical purposes. It should not be regarded as investigational or for   research.         ASSESSMENT:    ICD-10-CM    1. Routine general medical examination at a health care facility Z00.00    2. Pedal edema R60.0 furosemide (LASIX) 40 MG tablet     doxazosin (CARDURA) 4 MG tablet     metoprolol succinate (TOPROL-XL) 100 MG 24 hr tablet     lisinopril (PRINIVIL/ZESTRIL) 40 MG tablet     omeprazole (PRILOSEC) 40 MG capsule   3. Essential hypertension with goal blood pressure less than 140/90 I10 furosemide (LASIX) 40 MG tablet     doxazosin (CARDURA) 4 MG tablet     metoprolol succinate (TOPROL-XL) 100 MG 24 hr tablet     lisinopril (PRINIVIL/ZESTRIL) 40 MG tablet     omeprazole (PRILOSEC) 40 MG capsule   4. Abdominal pain, epigastric R10.13 furosemide (LASIX) 40 MG tablet     doxazosin (CARDURA) 4 MG tablet     metoprolol succinate (TOPROL-XL) 100 MG 24 hr tablet     lisinopril (PRINIVIL/ZESTRIL) 40 MG tablet     omeprazole (PRILOSEC) 40 MG capsule   5. Paroxysmal atrial fibrillation (H) I48.0    6. Epigastric pain R10.13 H Pylori antigen stool     Amylase     CBC with platelets     Hepatic panel     Lipase   7. Special screening for malignant neoplasms, colon Z12.11 GASTROENTEROLOGY ADULT REF PROCEDURE ONLY   8. Family history of prostate cancer in father Z80.42 Prostate spec antigen screen       Well-Adult Physical Exam.  Health Maintenance Due   Topic Date Due     EYE EXAM Q1 YEAR  10/13/2015     INFLUENZA VACCINE (SYSTEM  ASSIGNED)  09/01/2017     Health Maintenance   Topic Date Due     EYE EXAM Q1 YEAR  10/13/2015     INFLUENZA VACCINE (SYSTEM ASSIGNED)  09/01/2017     COLONOSCOPY Q5 YR  09/10/2018     TETANUS IMMUNIZATION (SYSTEM ASSIGNED)  09/10/2022     LIPID SCREEN Q5 YR MALE (SYSTEM ASSIGNED)  01/22/2023     MIGRAINE ACTION PLAN  Completed     HEPATITIS C SCREENING  Completed         HEALTH CARE MAINTENENCE: The recommended screening tests and vaccinatons for this patient have been discussed as above.  The appropriate tests and vaccinations  have been ordered or declined by the patient. Please see the orders in EPIC.The patient specifically declines: n/a influenza vaccination(s)     Immunization Status:  up to date and documented    Patient Active Problem List   Diagnosis     Brain injury (H)     Renal mass     Migraine headache     CARDIOVASCULAR SCREENING; LDL GOAL LESS THAN 130     Hypertension goal BP (blood pressure) < 140/90     Bilateral corneal scars     High triglycerides     Familial hypoalphalipoproteinemia     Migraine     Vitamin D deficiency     Hyperlipidemia LDL goal <130     Vitamin D deficiency disease     GERD (gastroesophageal reflux disease)     Tubular adenoma of colon     Paroxysmal atrial fibrillation (H)     Hematospermia     Lung nodule, solitary, Needs chest CT in Jan 2017     S/P laparoscopic cholecystectomy     Pulmonary nodule, right lung base needs fu scan     HDL deficiency        ATP III Guidelines  ICSI Preventive Guidelines    PLAN:   He will see cardiology in 1 month(s) for a blood pressure recheck   He will work on diet and exercise to help his Triglycerides and HDL and follow up with me in 5 or so months,      Colonoscopy recommended in September 2018  Check a PSA  Discussed calcium intake, vitamins and supplements. Recommended 1000 mg of calcium daily  Weight loss through diet and exercise was recommended  Sunscreen use was recommended especially in the area of tatoos  Refills on chronic  medication given  Recommended dental exams every 6 months  Recommended eye exam every 1-2 years  Follow up in 1 year for the next preventative medical visit      Body mass index is 39.31 kg/(m^2).      He has been having some nausea after eating,   He has been experiencing a burning discomfort in his epigastric area.      Results of his blood tests should be sent I a letter to his home   Address was verified.

## 2018-01-30 NOTE — NURSING NOTE
"Chief Complaint   Patient presents with     Physical       Initial BP (!) 175/99  Pulse 80  Temp 98.7  F (37.1  C) (Oral)  Ht 5' 10\" (1.778 m)  Wt 274 lb (124.3 kg)  SpO2 97%  BMI 39.31 kg/m2 Estimated body mass index is 39.31 kg/(m^2) as calculated from the following:    Height as of this encounter: 5' 10\" (1.778 m).    Weight as of this encounter: 274 lb (124.3 kg).  Medication Reconciliation: complete     Cyndie Ang, young    "

## 2018-01-30 NOTE — MR AVS SNAPSHOT
After Visit Summary   1/30/2018    Loy Worthington    MRN: [id number]           Patient Information     Date Of Birth          1965        Visit Information        Provider Department      1/30/2018 9:45 AM Hussein Sanches MD; [employer] [facility]        Today's Diagnoses     Routine general medical examination at a health care facility    -  1    Pedal edema        Essential hypertension with goal blood pressure less than 140/90        Abdominal pain, epigastric        Paroxysmal atrial fibrillation (H)        Epigastric pain        Special screening for malignant neoplasms, colon        Family history of prostate cancer in father          Care Instructions      Preventive Health Recommendations  Male Ages 50 - 64    Yearly exam:             See your health care provider every year in order to  o   Review health changes.   o   Discuss preventive care.    o   Review your medicines if your doctor has prescribed any.     Have a cholesterol test every 5 years, or more frequently if you are at risk for high cholesterol/heart disease.     Have a diabetes test (fasting glucose) every three years. If you are at risk for diabetes, you should have this test more often.     Have a colonoscopy at age 50, or have a yearly FIT test (stool test). These exams will check for colon cancer.      Talk with your health care provider about whether or not a prostate cancer screening test (PSA) is right for you.    You should be tested each year for STDs (sexually transmitted diseases), if you re at risk.     Shots: Get a flu shot each year. Get a tetanus shot every 10 years.     Nutrition:    Eat at least 5 servings of fruits and vegetables daily.     Eat whole-grain bread, whole-wheat pasta and brown rice instead of white grains and rice.     Talk to your provider about Calcium and Vitamin D.     Lifestyle    Exercise for at least 150 minutes a week (30 minutes a day, 5 days a week).  This will help you control your weight and prevent disease.     Limit alcohol to one drink per day.     No smoking.     Wear sunscreen to prevent skin cancer.     See your dentist every six months for an exam and cleaning.     See your eye doctor every 1 to 2 years.      Get back into doing regular workouts and then come see me in May or June for a cholesterol recheck and make sure to have a blood test about 1 week before.             Follow-ups after your visit        Additional Services     GASTROENTEROLOGY ADULT REF PROCEDURE ONLY       This should be scheduled in September 2018        Last Lab Result: Creatinine (mg/dL)       Date                     Value                 01/22/2018               0.83             ----------  Body mass index is 39.31 kg/(m^2).      Patient will be contacted to schedule procedure.     Please be aware that coverage of these services is subject to the terms and limitations of your health insurance plan.  Call member services at your health plan with any benefit or coverage questions.  Any procedures must be performed at a [place] facility OR coordinated by your clinic's referral office.    Please bring the following with you to your appointment:    (1) Any X-Rays, CTs or MRIs which have been performed.  Contact the facility where they were done to arrange for  prior to your scheduled appointment.    (2) List of current medications   (3) This referral request   (4) Any documents/labs given to you for this referral                  Follow-up notes from your care team     Return in about 5 months (around 6/30/2018) for cholesterol recheck.      Future tests that were ordered for you today     Open Future Orders        Priority Expected Expires Ordered    H Pylori antigen stool Routine  3/1/2018 1/30/2018            Who to contact     If you have questions or need follow up information about today's clinic visit or your schedule please contact [facility] directly  "at [phone].  Normal or non-critical lab and imaging results will be communicated to you by MyChart, letter or phone within 4 business days after the clinic has received the results. If you do not hear from us within 7 days, please contact the clinic through [employer] or phone. If you have a critical or abnormal lab result, we will notify you by phone as soon as possible.  Submit refill requests through [employer] or call your pharmacy and they will forward the refill request to us. Please allow 3 business days for your refill to be completed.          Additional Information About Your Visit        [employer] Information     SmartSynch lets you send messages to your doctor, view your test results, renew your prescriptions, schedule appointments and more. To sign up, go to [email or web address] . Click on \"Log in\" on the left side of the screen, which will take you to the Welcome page. Then click on \"Sign up Now\" on the right side of the page.     You will be asked to enter the access code listed below, as well as some personal information. Please follow the directions to create your username and password.     Your access code is: [email or web address]  Expires: 2018 11:09 AM     Your access code will  in 90 days. If you need help or a new code, please call your [place] clinic or [phone].        Care EveryWhere ID     This is your Care EveryWhere ID. This could be used by other organizations to access your [place] medical records  [id number]        Your Vitals Were     Pulse Temperature Height Pulse Oximetry BMI (Body Mass Index)       80 98.7  F (37.1  C) (Oral) 5' 10\" (1.778 m) 97% 39.31 kg/m2        Blood Pressure from Last 3 Encounters:   18 (!) 145/94   17 125/83   16 125/76    Weight from Last 3 Encounters:   18 274 lb (124.3 kg)   17 271 lb (122.9 kg)   16 269 lb 6.4 oz (122.2 kg)              We Performed the Following     Amylase     CBC with platelets     " GASTROENTEROLOGY ADULT REF PROCEDURE ONLY     Hepatic panel     Lipase     Prostate spec antigen screen          Today's Medication Changes          These changes are accurate as of 1/30/18 11:09 AM.  If you have any questions, ask your nurse or doctor.               Stop taking these medicines if you haven't already. Please contact your care team if you have questions.     flecainide 100 MG tablet   Commonly known as:  TAMBOCOR   Stopped by:  Hussein Sanches MD                Where to get your medicines      These medications were sent to [id number] [place] [address] [address]  [address] [address] [facility] [address]    Hours:  24-hours Phone:  [phone]     doxazosin 4 MG tablet    furosemide 40 MG tablet    lisinopril 40 MG tablet    metoprolol succinate 100 MG 24 hr tablet    omeprazole 40 MG capsule                Primary Care Provider Office Phone # Fax #    Hussein Sanches [phone] [address] [facility] [address]        Equal Access to Services     [facility]: Hadii aad ku hadasho Soomaali, waaxda luqadaha, qaybta kaalmada adeegyada, waxay idiin hayaan neema tinoco . So [facility] [phone].    ATENCIÓN: Si habla español, tiene a kemp disposición servicios gratuitos de asistencia lingüística. [facility] [phone].    We comply with applicable federal civil rights laws and [place] laws. We do not discriminate on the basis of race, color, national origin, age, disability, sex, sexual orientation, or gender identity.            Thank you!     Thank you for choosing [facility]  for your care. Our goal is always to provide you with excellent care. Hearing back from our patients is one way we can continue to improve our services. Please take a few minutes to complete the written survey that you may receive in the mail after your visit with us. Thank you!             Your Updated  Medication List - Protect others around you: Learn how to safely use, store and throw away your medicines at www.disposemymeds.org.          This list is accurate as of 1/30/18 11:09 AM.  Always use your most recent med list.                   Brand Name Dispense Instructions for use Diagnosis    Carboxymeth-Glycerin-Polysorb 0.5-1-0.5 % Soln    REFRESH OPTIVE ADVANCED     Apply 1 drop to eye 2 times daily    Dry eyes, bilateral       doxazosin 4 MG tablet    CARDURA    90 tablet    Take 1 tablet (4 mg) by mouth At Bedtime Need to keep upcoming appointment for further refills    Essential hypertension with goal blood pressure less than 140/90, Pedal edema, Abdominal pain, epigastric       furosemide 40 MG tablet    LASIX    90 tablet    Take 1 tablet (40 mg) by mouth every morning Need to keep upcoming appointment for further refills    Pedal edema, Essential hypertension with goal blood pressure less than 140/90, Abdominal pain, epigastric       lisinopril 40 MG tablet    PRINIVIL/ZESTRIL    90 tablet    Take 1 tablet (40 mg) by mouth daily    Essential hypertension with goal blood pressure less than 140/90, Pedal edema, Abdominal pain, epigastric       metoprolol succinate 100 MG 24 hr tablet    TOPROL-XL    90 tablet    Take 1 tablet (100 mg) by mouth daily    Essential hypertension with goal blood pressure less than 140/90, Pedal edema, Abdominal pain, epigastric       MULTAQ 400 MG Tabs tablet   Generic drug:  dronedarone           omeprazole 40 MG capsule    priLOSEC    90 capsule    Take 1 capsule (40 mg) by mouth daily Take 30-60 minutes before a meal.    Abdominal pain, epigastric, Essential hypertension with goal blood pressure less than 140/90, Pedal edema       rivaroxaban ANTICOAGULANT 20 MG Tabs tablet    XARELTO    90 tablet    Take 1 tablet (20 mg) by mouth daily (with dinner)    Paroxysmal atrial fibrillation (H)       SUMAtriptan 50 MG tablet    IMITREX    27 tablet    Take 1 tablet (50 mg) by  mouth at onset of headache May repeat in 2 hours if needed: max 2/day;    Other type of migraine       vitamin D 2000 UNITS tablet     90 tablet    Take 2,000 Units by mouth daily    Vitamin D deficiency disease

## 2018-01-30 NOTE — PATIENT INSTRUCTIONS
Preventive Health Recommendations  Male Ages 50   64    Yearly exam:             See your health care provider every year in order to  o   Review health changes.   o   Discuss preventive care.    o   Review your medicines if your doctor has prescribed any.     Have a cholesterol test every 5 years, or more frequently if you are at risk for high cholesterol/heart disease.     Have a diabetes test (fasting glucose) every three years. If you are at risk for diabetes, you should have this test more often.     Have a colonoscopy at age 50, or have a yearly FIT test (stool test). These exams will check for colon cancer.      Talk with your health care provider about whether or not a prostate cancer screening test (PSA) is right for you.    You should be tested each year for STDs (sexually transmitted diseases), if you re at risk.     Shots: Get a flu shot each year. Get a tetanus shot every 10 years.     Nutrition:    Eat at least 5 servings of fruits and vegetables daily.     Eat whole-grain bread, whole-wheat pasta and brown rice instead of white grains and rice.     Talk to your provider about Calcium and Vitamin D.     Lifestyle    Exercise for at least 150 minutes a week (30 minutes a day, 5 days a week). This will help you control your weight and prevent disease.     Limit alcohol to one drink per day.     No smoking.     Wear sunscreen to prevent skin cancer.     See your dentist every six months for an exam and cleaning.     See your eye doctor every 1 to 2 years.      Get back into doing regular workouts and then come see me in May or June for a cholesterol recheck and make sure to have a blood test about 1 week before.

## 2018-01-31 DIAGNOSIS — R10.13 EPIGASTRIC PAIN: ICD-10-CM

## 2018-01-31 PROCEDURE — 87338 HPYLORI STOOL AG IA: CPT | Performed by: FAMILY MEDICINE

## 2018-02-01 ENCOUNTER — TELEPHONE (OUTPATIENT)
Dept: FAMILY MEDICINE | Facility: CLINIC | Age: 53
End: 2018-02-01

## 2018-02-01 DIAGNOSIS — B96.81 HELICOBACTER PYLORI GASTRITIS: Primary | ICD-10-CM

## 2018-02-01 DIAGNOSIS — K29.70 HELICOBACTER PYLORI GASTRITIS: Primary | ICD-10-CM

## 2018-02-01 LAB
H PYLORI AG STL QL IA: ABNORMAL
SPECIMEN SOURCE: ABNORMAL

## 2018-02-01 RX ORDER — AMOXICILLIN AND CLAVULANATE POTASSIUM 500; 125 MG/1; MG/1
2 TABLET, FILM COATED ORAL 2 TIMES DAILY
Qty: 28 TABLET | Refills: 0 | Status: SHIPPED | OUTPATIENT
Start: 2018-02-01 | End: 2018-02-07

## 2018-02-01 RX ORDER — CLARITHROMYCIN 500 MG
500 TABLET ORAL 2 TIMES DAILY
Qty: 28 TABLET | Refills: 0 | Status: SHIPPED | OUTPATIENT
Start: 2018-02-01 | End: 2018-06-07

## 2018-02-02 NOTE — TELEPHONE ENCOUNTER
Patient/parent is informed of MD note below, as it is written. Verbalized good understanding.  Dalia Guevara RN

## 2018-02-02 NOTE — TELEPHONE ENCOUNTER
Please call the patient regarding his tests. He had a positive H pylori test so we need to start a medication to treat this as soon as possible.   He should continue(s) to take his prilosec  I  Have sent his prescription(s) to      [id number] [place] - [address] [facility]

## 2018-02-06 ENCOUNTER — OFFICE VISIT (OUTPATIENT)
Dept: OPTOMETRY | Facility: CLINIC | Age: 53
End: 2018-02-06
Payer: COMMERCIAL

## 2018-02-06 DIAGNOSIS — H52.4 PRESBYOPIA: Primary | ICD-10-CM

## 2018-02-06 DIAGNOSIS — H25.041 POSTERIOR SUBCAPSULAR POLAR SENILE CATARACT OF RIGHT EYE: ICD-10-CM

## 2018-02-06 PROCEDURE — 92004 COMPRE OPH EXAM NEW PT 1/>: CPT | Performed by: OPTOMETRIST

## 2018-02-06 PROCEDURE — 92015 DETERMINE REFRACTIVE STATE: CPT | Performed by: OPTOMETRIST

## 2018-02-06 ASSESSMENT — SLIT LAMP EXAM - LIDS
COMMENTS: NORMAL
COMMENTS: NORMAL

## 2018-02-06 ASSESSMENT — REFRACTION_WEARINGRX
OD_SPHERE: +1.75
OS_SPHERE: +1.75
SPECS_TYPE: OTC'S

## 2018-02-06 ASSESSMENT — CUP TO DISC RATIO
OD_RATIO: 0.5
OS_RATIO: 0.5

## 2018-02-06 ASSESSMENT — VISUAL ACUITY
OS_SC: 20/20
OD_SC+: -1
METHOD: NUMBERS - LINEAR
OS_SC+: -3
OD_SC: 20/25
OD_SC: 20/50-2
OS_SC: 20/50

## 2018-02-06 ASSESSMENT — CONF VISUAL FIELD
OS_NORMAL: 1
METHOD: COUNTING FINGERS
OD_NORMAL: 1

## 2018-02-06 ASSESSMENT — KERATOMETRY
OS_K1POWER_DIOPTERS: 43.25
OD_K2POWER_DIOPTERS: 44.00
OD_K1POWER_DIOPTERS: 45.25
OD_AXISANGLE2_DEGREES: 10
OS_AXISANGLE2_DEGREES: 4
OS_K2POWER_DIOPTERS: 42.50

## 2018-02-06 ASSESSMENT — REFRACTION_MANIFEST
OS_AXIS: 18
METHOD_AUTOREFRACTION: 1
OS_CYLINDER: SPHERE
OS_SPHERE: +0.25
OD_SPHERE: +0.25
OD_ADD: +2.00
OD_SPHERE: +0.25
OS_SPHERE: +0.25
OS_CYLINDER: +0.25
OD_CYLINDER: SPHERE
OS_ADD: +2.00

## 2018-02-06 ASSESSMENT — EXTERNAL EXAM - RIGHT EYE: OD_EXAM: NORMAL

## 2018-02-06 ASSESSMENT — TONOMETRY
OD_IOP_MMHG: 12
IOP_METHOD: APPLANATION
OS_IOP_MMHG: 12

## 2018-02-06 ASSESSMENT — EXTERNAL EXAM - LEFT EYE: OS_EXAM: NORMAL

## 2018-02-06 NOTE — MR AVS SNAPSHOT
After Visit Summary   2/6/2018    Loy Worthington    MRN: [id number]           Patient Information     Date Of Birth          1965        Visit Information        Provider Department      2/6/2018 8:45 AM Maddie Buck OD; [employer] [facility]        Care Instructions    Patient was advised of today's exam findings.  Reading glasses advised  Monitor mild cataract right eye  Return in 1 year for eye exam    Maddie Buck O.D.  [facility]   [address] [place] [address]  [phone]            Follow-ups after your visit        Your next 10 appointments already scheduled     May 31, 2018  8:00 AM CDT   LAB with AN LAB   [facility]    [address] [facility] [address] [phone]           Please do not eat 10-12 hours before your appointment if you are coming in fasting for labs on lipids, cholesterol, or glucose (sugar). This does not apply to pregnant women. Water, hot tea and black coffee (with nothing added) are okay. Do not drink other fluids, diet soda or chew gum.            Jun 07, 2018  1:30 PM CDT   Office Visit with Hussein Sanches MD   [facility]    [address] [facility] [address] [phone]           Bring a current list of meds and any records pertaining to this visit. For Physicals, please bring immunization records and any forms needing to be filled out. Please arrive 10 minutes early to complete paperwork.              Who to contact     If you have questions or need follow up information about today's clinic visit or your schedule please contact [facility] directly at [phone].  Normal or non-critical lab and imaging results will be communicated to you by MyChart, letter or phone within 4 business days after the clinic has received the results. If you do not hear from us within 7 days, please  "contact the clinic through [employer] or phone. If you have a critical or abnormal lab result, we will notify you by phone as soon as possible.  Submit refill requests through [employer] or call your pharmacy and they will forward the refill request to us. Please allow 3 business days for your refill to be completed.          Additional Information About Your Visit        [employer] Information     [employer] lets you send messages to your doctor, view your test results, renew your prescriptions, schedule appointments and more. To sign up, go to [email or web address] . Click on \"Log in\" on the left side of the screen, which will take you to the Welcome page. Then click on \"Sign up Now\" on the right side of the page.     You will be asked to enter the access code listed below, as well as some personal information. Please follow the directions to create your username and password.     Your access code is: [email or web address]  Expires: 2018 11:09 AM     Your access code will  in 90 days. If you need help or a new code, please call your [place] clinic or [phone].        Care EveryWhere ID     This is your Care EveryWhere ID. This could be used by other organizations to access your [place] medical records  [id number]         Blood Pressure from Last 3 Encounters:   18 (!) 145/94   17 125/83   16 125/76    Weight from Last 3 Encounters:   18 124.3 kg (274 lb)   17 122.9 kg (271 lb)   16 122.2 kg (269 lb 6.4 oz)              Today, you had the following     No orders found for display       Primary Care Provider Office Phone # Fax #    Hussein Sanches [phone] [address] [address]        Equal Access to Services     CONCEPCION JESUS : Corina Walsh, waaxda luqadaha, qaybta kaalmada julia, jamar cassidy. [facility] [phone].    ATENCIÓN: Si habla nicole, tiene a kemp disposición servicios " tony de asistencia lingüística. Moy nichols [phone].    We comply with applicable federal civil rights laws and [place] laws. We do not discriminate on the basis of race, color, national origin, age, disability, sex, sexual orientation, or gender identity.            Thank you!     Thank you for choosing [facility]  [facility] your care. Our goal is always to provide you with excellent care. Hearing back from our patients is one way we can continue to improve our services. Please take a few minutes to complete the written survey that you may receive in the mail after your visit with us. Thank you!             Your Updated Medication List - Protect others around you: Learn how to safely use, store and throw away your medicines at www.disposemymeds.org.          This list is accurate as of 2/6/18  9:28 AM.  Always use your most recent med list.                   Brand Name Dispense Instructions for use Diagnosis    amoxicillin-clavulanate 500-125 MG per tablet    AUGMENTIN    28 tablet    Take 2 tablets by mouth 2 times daily    Helicobacter pylori gastritis       Carboxymeth-Glycerin-Polysorb 0.5-1-0.5 % Soln    REFRESH OPTIVE ADVANCED     Apply 1 drop to eye 2 times daily    Dry eyes, bilateral       clarithromycin 500 MG tablet    BIAXIN    28 tablet    Take 1 tablet (500 mg) by mouth 2 times daily    Helicobacter pylori gastritis       doxazosin 4 MG tablet    CARDURA    90 tablet    Take 1 tablet (4 mg) by mouth At Bedtime Need to keep upcoming appointment for further refills    Essential hypertension with goal blood pressure less than 140/90, Pedal edema, Abdominal pain, epigastric       furosemide 40 MG tablet    LASIX    90 tablet    Take 1 tablet (40 mg) by mouth every morning Need to keep upcoming appointment for further refills    Pedal edema, Essential hypertension with goal blood pressure less than 140/90, Abdominal pain, epigastric       lisinopril 40 MG tablet    PRINIVIL/ZESTRIL    90  tablet    Take 1 tablet (40 mg) by mouth daily    Essential hypertension with goal blood pressure less than 140/90, Pedal edema, Abdominal pain, epigastric       metoprolol succinate 100 MG 24 hr tablet    TOPROL-XL    90 tablet    Take 1 tablet (100 mg) by mouth daily    Essential hypertension with goal blood pressure less than 140/90, Pedal edema, Abdominal pain, epigastric       MULTAQ 400 MG Tabs tablet   Generic drug:  dronedarone           omeprazole 40 MG capsule    priLOSEC    90 capsule    Take 1 capsule (40 mg) by mouth daily Take 30-60 minutes before a meal.    Abdominal pain, epigastric, Essential hypertension with goal blood pressure less than 140/90, Pedal edema       rivaroxaban ANTICOAGULANT 20 MG Tabs tablet    XARELTO    90 tablet    Take 1 tablet (20 mg) by mouth daily (with dinner)    Paroxysmal atrial fibrillation (H)       SUMAtriptan 50 MG tablet    IMITREX    27 tablet    Take 1 tablet (50 mg) by mouth at onset of headache May repeat in 2 hours if needed: max 2/day;    Other type of migraine       vitamin D 2000 UNITS tablet     90 tablet    Take 2,000 Units by mouth daily    Vitamin D deficiency disease

## 2018-02-06 NOTE — PROGRESS NOTES
Chief Complaint   Patient presents with     COMPREHENSIVE EYE EXAM      Accompanied by , Adriana   Last Eye Exam: 10/13/2014  Dilated Previously: Yes- Patient declined dilation today- going to work    What are you currently using to see?  Readers, uses +1.75's. Did not bring them to this appointment        Distance Vision Acuity: Noticed gradual change in both eyes, getting blurry. More noticeable when he gets tired     Near Vision Acuity: Not satisfied, also changes to near vision. Having some blurriness when he reads    Eye Comfort: Eyes are red, some watering. In evening sometimes burning in the evenings, not often   Do you use eye drops? : No  Occupation or Hobbies: [employer]           Medical, surgical and family histories reviewed and updated 2/6/2018.       OBJECTIVE: See Ophthalmology exam    ASSESSMENT:    ICD-10-CM    1. Presbyopia H52.4 EYE EXAM (SIMPLE-NONBILLABLE)     REFRACTION   2. Posterior subcapsular polar senile cataract of right eye H25.041 EYE EXAM (SIMPLE-NONBILLABLE)     REFRACTION      PLAN:     Patient Instructions   Patient was advised of today's exam findings.  Reading glasses advised  Monitor mild cataract right eye  Return in 1 year for eye exam    Maddie Buck O.D.  [facility]   [address] [place] [address] [phone]

## 2018-02-06 NOTE — PATIENT INSTRUCTIONS
Patient was advised of today's exam findings.  Reading glasses advised  Monitor mild cataract right eye  Return in 1 year for eye exam    Maddie Buck O.D.  [facility]   [address] [place] [address] [phone]

## 2018-02-07 ENCOUNTER — TELEPHONE (OUTPATIENT)
Dept: FAMILY MEDICINE | Facility: CLINIC | Age: 53
End: 2018-02-07

## 2018-02-07 DIAGNOSIS — K29.70 HELICOBACTER PYLORI GASTRITIS: ICD-10-CM

## 2018-02-07 DIAGNOSIS — B96.81 HELICOBACTER PYLORI GASTRITIS: ICD-10-CM

## 2018-02-07 RX ORDER — FLECAINIDE ACETATE 100 MG/1
TABLET ORAL
COMMUNITY
Start: 2018-02-07 | End: 2019-02-05

## 2018-02-07 RX ORDER — AMOXICILLIN 500 MG/1
1000 CAPSULE ORAL 2 TIMES DAILY
Qty: 56 CAPSULE | Refills: 0 | Status: SHIPPED | OUTPATIENT
Start: 2018-02-07 | End: 2019-02-05

## 2018-02-07 NOTE — TELEPHONE ENCOUNTER
I have discussed this patient's issue with the RN involved and we have come up with this plan.  Hussein Sanches MD

## 2018-02-07 NOTE — TELEPHONE ENCOUNTER
Per pharmacist, patient is currently taking Flecainide, for Cardiac reasons.  Prescribe by   Cardiologist.  Major drug interaction.   Please advise.  Dalia Guevara RN

## 2018-02-07 NOTE — TELEPHONE ENCOUNTER
Pharmacy calling to inform pcp that there is a drug interaction with new script for amoxicillin-clavulanate (AUGMENTIN) 500  Please call to discuss  Thank you

## 2018-02-07 NOTE — TELEPHONE ENCOUNTER
Information below is reviewed with  Dr Sanches, Verbal Orders to prescribe Amoxicillin 1000 mg 2 times daily x 14 days.   That does not flag for complication.  Spoke to pharmacy and informed new prescription was sent.  Verbalized good understanding.     Per protocol, will route encounter to be cosigned by provider for Verbal Orders.  Dalia Guevara RN

## 2018-02-28 ENCOUNTER — TELEPHONE (OUTPATIENT)
Dept: FAMILY MEDICINE | Facility: CLINIC | Age: 53
End: 2018-02-28

## 2018-02-28 NOTE — TELEPHONE ENCOUNTER
Panel Management Review      Patient has the following on his problem list:     Hypertension   Last three blood pressure readings:  BP Readings from Last 3 Encounters:   01/30/18 (!) 145/94   06/13/17 125/83   06/22/16 125/76     Blood pressure: FAILED    HTN Guidelines:  Age 18-59 BP range:  Less than 140/90  Age 60-85 with Diabetes:  Less than 140/90  Age 60-85 without Diabetes:  less than 150/90      Composite cancer screening  Chart review shows that this patient is due/due soon for the following None  Summary:    Patient is due/failing the following:   BP CHECK    Action needed:   Routed to provider for review.    Type of outreach:    None, routed to provider for review.    Questions for provider review:    Patients blood pressure was high at last visit. Unsure of the plan. Please advise.                                                                                                                                    Cyndie Ang [facility]       Chart routed to Provider .

## 2018-02-28 NOTE — TELEPHONE ENCOUNTER
PLAN:   He will see cardiology in 1 month(s) for a blood pressure recheck   He will work on diet and exercise to help his Triglycerides and HDL and follow up with me in 5 or so months.    The above is from his complete physical exam note. Please contact him and see what his blood pressure was at his cardiology appointment(s) and that we should get an ancillary appointment(s) if it was normal and an appointment(s) with me if it was abnormal

## 2018-03-06 ENCOUNTER — TRANSFERRED RECORDS (OUTPATIENT)
Dept: HEALTH INFORMATION MANAGEMENT | Facility: CLINIC | Age: 53
End: 2018-03-06

## 2018-03-06 NOTE — TELEPHONE ENCOUNTER
Patient has not seen the cardiologist yet. They could not give anymore information per Hippa.  ANA Florence

## 2018-05-24 ENCOUNTER — DOCUMENTATION ONLY (OUTPATIENT)
Dept: LAB | Facility: CLINIC | Age: 53
End: 2018-05-24

## 2018-05-24 DIAGNOSIS — I10 HYPERTENSION GOAL BP (BLOOD PRESSURE) < 140/90: ICD-10-CM

## 2018-05-24 DIAGNOSIS — Z13.6 CARDIOVASCULAR SCREENING; LDL GOAL LESS THAN 130: Primary | ICD-10-CM

## 2018-05-24 NOTE — PROGRESS NOTES
Lab still needs orders signed for tomorrow morning lab appointment please.    Thank you!  Senthil Lundy [facility]        Please review and sign Pending Pre-visit Labs in [facility].Labs 05/31/18 and OV cholesterol ck 06/07/18   Barby PEREZ

## 2018-05-24 NOTE — PROGRESS NOTES
Patient has a lab appointment on 05/31/2018. Per the lab schedule scrubbing protocol they are not due for anything. Please review chart and send orders or let patient know appointment is not needed.    Thank you,  Lola Kulkarni

## 2018-05-31 DIAGNOSIS — Z13.6 CARDIOVASCULAR SCREENING; LDL GOAL LESS THAN 130: ICD-10-CM

## 2018-05-31 DIAGNOSIS — I10 HYPERTENSION GOAL BP (BLOOD PRESSURE) < 140/90: ICD-10-CM

## 2018-05-31 LAB
ANION GAP SERPL CALCULATED.3IONS-SCNC: 6 MMOL/L (ref 3–14)
BUN SERPL-MCNC: 20 MG/DL (ref 7–30)
CALCIUM SERPL-MCNC: 8.7 MG/DL (ref 8.5–10.1)
CHLORIDE SERPL-SCNC: 107 MMOL/L (ref 94–109)
CHOLEST SERPL-MCNC: 144 MG/DL
CO2 SERPL-SCNC: 29 MMOL/L (ref 20–32)
CREAT SERPL-MCNC: 0.9 MG/DL (ref 0.66–1.25)
GFR SERPL CREATININE-BSD FRML MDRD: 87 ML/MIN/1.7M2
GLUCOSE SERPL-MCNC: 98 MG/DL (ref 70–99)
HDLC SERPL-MCNC: 31 MG/DL
LDLC SERPL CALC-MCNC: 76 MG/DL
NONHDLC SERPL-MCNC: 113 MG/DL
POTASSIUM SERPL-SCNC: 4.1 MMOL/L (ref 3.4–5.3)
SODIUM SERPL-SCNC: 142 MMOL/L (ref 133–144)
TRIGL SERPL-MCNC: 184 MG/DL

## 2018-05-31 PROCEDURE — 80061 LIPID PANEL: CPT | Performed by: FAMILY MEDICINE

## 2018-05-31 PROCEDURE — 80048 BASIC METABOLIC PNL TOTAL CA: CPT | Performed by: FAMILY MEDICINE

## 2018-05-31 PROCEDURE — 36415 COLL VENOUS BLD VENIPUNCTURE: CPT | Performed by: FAMILY MEDICINE

## 2018-06-03 DIAGNOSIS — E55.9 VITAMIN D DEFICIENCY DISEASE: ICD-10-CM

## 2018-06-06 RX ORDER — ACETAMINOPHEN 160 MG
TABLET,DISINTEGRATING ORAL
Qty: 90 CAPSULE | Refills: 1 | Status: SHIPPED | OUTPATIENT
Start: 2018-06-06 | End: 2018-10-10

## 2018-06-07 ENCOUNTER — OFFICE VISIT (OUTPATIENT)
Dept: FAMILY MEDICINE | Facility: CLINIC | Age: 53
End: 2018-06-07
Payer: MEDICAID

## 2018-06-07 VITALS
WEIGHT: 254 LBS | TEMPERATURE: 98.4 F | OXYGEN SATURATION: 98 % | SYSTOLIC BLOOD PRESSURE: 129 MMHG | BODY MASS INDEX: 36.45 KG/M2 | DIASTOLIC BLOOD PRESSURE: 74 MMHG | HEART RATE: 68 BPM | RESPIRATION RATE: 20 BRPM

## 2018-06-07 DIAGNOSIS — E78.6 HDL DEFICIENCY: ICD-10-CM

## 2018-06-07 DIAGNOSIS — I10 HYPERTENSION GOAL BP (BLOOD PRESSURE) < 140/90: ICD-10-CM

## 2018-06-07 DIAGNOSIS — E78.1 HIGH TRIGLYCERIDES: ICD-10-CM

## 2018-06-07 DIAGNOSIS — E55.9 VITAMIN D DEFICIENCY DISEASE: ICD-10-CM

## 2018-06-07 DIAGNOSIS — E78.5 HYPERLIPIDEMIA LDL GOAL <130: Primary | ICD-10-CM

## 2018-06-07 DIAGNOSIS — Z11.4 SCREENING FOR HUMAN IMMUNODEFICIENCY VIRUS: ICD-10-CM

## 2018-06-07 DIAGNOSIS — I48.0 PAROXYSMAL ATRIAL FIBRILLATION (H): ICD-10-CM

## 2018-06-07 DIAGNOSIS — K21.9 GASTROESOPHAGEAL REFLUX DISEASE, ESOPHAGITIS PRESENCE NOT SPECIFIED: ICD-10-CM

## 2018-06-07 PROCEDURE — 99214 OFFICE O/P EST MOD 30 MIN: CPT | Performed by: FAMILY MEDICINE

## 2018-06-07 NOTE — NURSING NOTE
"Chief Complaint   Patient presents with     Lipids     Health Maintenance     order pended       Initial /81  Pulse 68  Temp 98.4  F (36.9  C) (Oral)  Resp 20  Wt 254 lb (115.2 kg)  SpO2 98%  BMI 36.45 kg/m2 Estimated body mass index is 36.45 kg/(m^2) as calculated from the following:    Height as of 1/30/18: 5' 10\" (1.778 m).    Weight as of this encounter: 254 lb (115.2 kg).  Medication Reconciliation: complete  Cyndie Ang CMA  "

## 2018-06-07 NOTE — PROGRESS NOTES
SUBJECTIVE:    SUBJECTIVE:  Loy Worthington is an 53 year old male who presents for a follow up evaluation of his hypertension.The patient was kept on the same medications at the last visit. He saw his cardiologist in March and was in sinus rhythym. The patient reports that he IS taking the medication as prescribed. He missed a few doses because of an insurance change. But he has taking it today.  He denies side effects of medication.  He has lost 40 pounds recently and would like to lose 20 more.     Patient Active Problem List   Diagnosis     Brain injury (H)     Renal mass     Migraine headache     CARDIOVASCULAR SCREENING; LDL GOAL LESS THAN 130     Hypertension goal BP (blood pressure) < 140/90     Bilateral corneal scars     High triglycerides     Familial hypoalphalipoproteinemia     Migraine     Vitamin D deficiency     Hyperlipidemia LDL goal <130     Vitamin D deficiency disease     GERD (gastroesophageal reflux disease)     Tubular adenoma of colon     Paroxysmal atrial fibrillation (H)     Hematospermia     Lung nodule, solitary, Needs chest CT in Jan 2017     S/P laparoscopic cholecystectomy     Pulmonary nodule, right lung base needs fu scan     HDL deficiency       Is the HYPERTENSION goal on the problem list? Yes      Use of agents associated with hypertension: none  Current Outpatient Prescriptions   Medication     Cholecalciferol (VITAMIN D3) 2000 units CAPS     doxazosin (CARDURA) 4 MG tablet     flecainide (TAMBOCOR) 100 MG tablet     furosemide (LASIX) 40 MG tablet     lisinopril (PRINIVIL/ZESTRIL) 40 MG tablet     metoprolol succinate (TOPROL-XL) 100 MG 24 hr tablet     MULTAQ 400 MG TABS tablet     omeprazole (PRILOSEC) 40 MG capsule     rivaroxaban ANTICOAGULANT (XARELTO) 20 MG TABS tablet     SUMAtriptan (IMITREX) 50 MG tablet     No current facility-administered medications for this visit.          Allergies   Allergen Reactions     Zocor [Simvastatin - High Dose] Swelling      Redness,itching,swelling         Social History   Substance Use Topics     Smoking status: Never Smoker     Smokeless tobacco: Never Used      Comment: Lives in smoke free household     Alcohol use No       OBJECTIVE:  /74  Pulse 68  Temp 98.4  F (36.9  C) (Oral)  Resp 20  Wt 254 lb (115.2 kg)  SpO2 98%  BMI 36.45 kg/m2    Heart: negative, PMI normal. No lifts, heaves, or thrills. RRR. No murmurs, clicks gallops or rub  Lower Extremities: 0 edema on right and 0 edema on the left        Results for orders placed or performed in visit on 05/31/18   Lipid panel reflex to direct LDL Fasting   Result Value Ref Range    Cholesterol 144 <200 mg/dL    Triglycerides 184 (H) <150 mg/dL    HDL Cholesterol 31 (L) >39 mg/dL    LDL Cholesterol Calculated 76 <100 mg/dL    Non HDL Cholesterol 113 <130 mg/dL   **Basic metabolic panel FUTURE anytime   Result Value Ref Range    Sodium 142 133 - 144 mmol/L    Potassium 4.1 3.4 - 5.3 mmol/L    Chloride 107 94 - 109 mmol/L    Carbon Dioxide 29 20 - 32 mmol/L    Anion Gap 6 3 - 14 mmol/L    Glucose 98 70 - 99 mg/dL    Urea Nitrogen 20 7 - 30 mg/dL    Creatinine 0.90 0.66 - 1.25 mg/dL    GFR Estimate 87 >60 mL/min/1.7m2    GFR Estimate If Black >90 >60 mL/min/1.7m2    Calcium 8.7 8.5 - 10.1 mg/dL       The 10-year ASCVD risk score (Raafeldesiree TALLEY Jr, et al., 2013) is: 6.3%    Values used to calculate the score:      Age: 53 years      Sex: Male      Is Non- : No      Diabetic: No      Tobacco smoker: No      Systolic Blood Pressure: 129 mmHg      Is BP treated: Yes      HDL Cholesterol: 31 mg/dL      Total Cholesterol: 144 mg/dL    ASSESSMENT:  Essential hypertension which is well controlled. It would likely be better if he has not missed medication recently.      Plan:  - Medication:continue the current doses of medication.    The patient reported that he did not need any refills at this time.  The patient was advised to do the following therapuetic life style  changes  - Dietary sodium restriction and increase potassium and Calcium intake  - Regular aerobic exercise  - Weight loss  - Discontinue smoking if applicable  - Avoid regular NSAID use if applicable  - Avoid regular decongestant use if applicable  - Follow up in clinic in 6 months for a recheck  - Check a basic metabolic panel today    Patient Education: Reviewed risks of hypertension and principles of   Treatment.    --------------------------------------------------------------------------------------------------------------------------------------      Loy Worthington is a 53 year old male who  presents for a recheck of dyslipidemia.  He has lost 40 pounds by avoiding sweets and breads.   He is walking outside 2-3 times a week for about 1 hour(s) at a time.   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        The 10-year ASCVD risk score (Rafael GERRI Jr, et al., 2013) is: 6.3%    Values used to calculate the score:      Age: 53 years      Sex: Male      Is Non- : No      Diabetic: No      Tobacco smoker: No      Systolic Blood Pressure: 129 mmHg      Is BP treated: Yes      HDL Cholesterol: 31 mg/dL      Total Cholesterol: 144 mg/dL    Patient Active Problem List   Diagnosis     Brain injury (H)     Renal mass     Migraine headache     CARDIOVASCULAR SCREENING; LDL GOAL LESS THAN 130     Hypertension goal BP (blood pressure) < 140/90     Bilateral corneal scars     High triglycerides     Familial hypoalphalipoproteinemia     Migraine     Vitamin D deficiency     Hyperlipidemia LDL goal <130     Vitamin D deficiency disease     GERD (gastroesophageal reflux disease)     Tubular adenoma of colon     Paroxysmal atrial fibrillation (H)     Hematospermia     Lung nodule, solitary, Needs chest CT in Jan 2017     S/P laparoscopic cholecystectomy     Pulmonary nodule, right lung base needs fu scan     HDL deficiency       Family History   Problem Relation Age of Onset     Hypertension Father       Hypertension Brother      Macular Degeneration No family hx of      Glaucoma No family hx of      Thyroid Disease No family hx of      CANCER No family hx of      DIABETES No family hx of      CEREBROVASCULAR DISEASE No family hx of        Social History   Substance Use Topics     Smoking status: Never Smoker     Smokeless tobacco: Never Used      Comment: Lives in smoke free household     Alcohol use No       Current Outpatient Prescriptions   Medication Sig Dispense Refill     Cholecalciferol (VITAMIN D3) 2000 units CAPS TAKE 1 CAPSULE BY MOUTH ONCE DAILY 90 capsule 1     doxazosin (CARDURA) 4 MG tablet Take 1 tablet (4 mg) by mouth At Bedtime Need to keep upcoming appointment for further refills 90 tablet 3     flecainide (TAMBOCOR) 100 MG tablet        furosemide (LASIX) 40 MG tablet Take 1 tablet (40 mg) by mouth every morning Need to keep upcoming appointment for further refills 90 tablet 3     lisinopril (PRINIVIL/ZESTRIL) 40 MG tablet Take 1 tablet (40 mg) by mouth daily 90 tablet 3     metoprolol succinate (TOPROL-XL) 100 MG 24 hr tablet Take 1 tablet (100 mg) by mouth daily 90 tablet 3     MULTAQ 400 MG TABS tablet        omeprazole (PRILOSEC) 40 MG capsule Take 1 capsule (40 mg) by mouth daily Take 30-60 minutes before a meal. 90 capsule 3     rivaroxaban ANTICOAGULANT (XARELTO) 20 MG TABS tablet Take 1 tablet (20 mg) by mouth daily (with dinner) 90 tablet 1     SUMAtriptan (IMITREX) 50 MG tablet Take 1 tablet (50 mg) by mouth at onset of headache May repeat in 2 hours if needed: max 2/day; 27 tablet 3       The patient denies any side effects from his cholesterol medications.n/a      The patient is currently following a low fat diet plan.     Review of lipid profiles:    Lab Results   Component Value Date    CHOL 144 05/31/2018    CHOL 164 01/22/2018    CHOL 142 06/15/2016    CHOL 171 04/08/2015    CHOL 172 09/18/2014    CHOL 161 01/13/2014    CHOL 170 06/28/2013    CHOL 193 01/28/2013    CHOL 213  09/10/2012    CHOL 214 01/05/2012    CHOL 200 08/24/2010    CHOL 166 01/20/2010     Lab Results   Component Value Date    LDL 76 05/31/2018    LDL 92 01/22/2018    LDL 62 06/15/2016    LDL 89 04/08/2015     09/18/2014     01/13/2014    LDL 99 06/28/2013    LDL  01/28/2013     Cannot estimate LDL when triglyceride exceeds 400 mg/dL     01/28/2013     09/10/2012     01/05/2012     08/24/2010     01/20/2010     Lab Results   Component Value Date    HDL 31 05/31/2018    HDL 35 01/22/2018    HDL 32 06/15/2016    HDL 32 04/08/2015    HDL 32 09/18/2014    HDL 27 01/13/2014    HDL 30 06/28/2013    HDL 29 01/28/2013    HDL 31 09/10/2012    HDL 33 01/05/2012    HDL 32 08/24/2010    HDL 32 01/20/2010     Lab Results   Component Value Date    TRIG 184 05/31/2018    TRIG 184 01/22/2018    TRIG 239 06/15/2016    TRIG 252 04/08/2015    TRIG 161 09/18/2014    TRIG 168 01/13/2014    TRIG 206 06/28/2013    TRIG 427 01/28/2013    TRIG 233 09/10/2012    TRIG 190 01/05/2012    TRIG 291 08/24/2010    TRIG 91 01/20/2010       Thyroid study review:  Lab Results   Component Value Date    TSH 1.43 09/18/2014    TSH 1.67 12/21/2012    TSH 1.34 01/05/2012    TSH 2.36 01/20/2010       OBJECTIVE:  No apparent distress  Blood pressure 129/74, pulse 68, temperature 98.4  F (36.9  C), temperature source Oral, resp. rate 20, weight 254 lb (115.2 kg), SpO2 98 %.  Abdomen: soft, positive for bowel sounds, contender, no  hepatosplenomegaly.    ASSESSMENT:   Dyslipidemia  The 10-year ASCVD risk score (Drybranchdesiree TALLEY Jr, et al., 2013) is: 6.3%    Values used to calculate the score:      Age: 53 years      Sex: Male      Is Non- : No      Diabetic: No      Tobacco smoker: No      Systolic Blood Pressure: 129 mmHg      Is BP treated: Yes      HDL Cholesterol: 31 mg/dL      Total Cholesterol: 144 mg/dL      PLAN:   With a 6.3 % 10 year risk of having MI/Stroke by ACC/AHA risk calculator, the  patient's LDL is normal.  his HDL is too low.  his triglycerides are too high.    We have discussed the results and we will continue the current management. He will work on his diet and getting a little more aerobic exercise.   The patient was encouraged to return for a yearly physical in February 2019 at which time we will recheck the fasting lipid panel.

## 2018-06-07 NOTE — MR AVS SNAPSHOT
"              After Visit Summary   6/7/2018    Loy Worthington    MRN: [id number]           Patient Information     Date Of Birth          1965        Visit Information        Provider Department      6/7/2018 1:15 PM Hussein Sanches MD; [employer] [facility]        Today's Diagnoses     Hyperlipidemia LDL goal <130    -  1    High triglycerides        HDL deficiency        Hypertension goal BP (blood pressure) < 140/90        Paroxysmal atrial fibrillation (H)        Vitamin D deficiency disease        Gastroesophageal reflux disease, esophagitis presence not specified        Screening for human immunodeficiency virus          Care Instructions      Triglycerides  Does this test have other names?  Lipid panel, fasting lipoprotein panel  What is this test?  This test measures the amount of triglycerides in your blood.  Triglycerides are one of several types of fats in your blood. Other kinds are LDL (\"bad\") cholesterol and HDL (\"good\") cholesterol.  Knowing your triglyceride level is important, especially if you have diabetes, are overweight or a smoker, or are mostly inactive. High triglyceride levels may put you at greater risk for a heart attack or stroke.    This test is part of a group of cholesterol and blood fat tests called a fasting lipoprotein panel, or lipid panel. This panel is recommended for all adults at least once every 5 years, or as recommended by your healthcare provider.  Knowing your triglyceride level helps your healthcare provider suggest healthy changes to your diet or lifestyle. If you have triglycerides that are high to very high, your provider is more likely to prescribe medicines to lower your triglycerides or your LDL cholesterol.  Why do I need this test?  You may need this test as part of a routine checkup. You may also need this test if you're overweight, drink too much alcohol, rarely exercise, or have other conditions like high blood pressure " or diabetes.  If you are on cholesterol-lowering medicines, you may have this test to see how well your treatment is working.  What other tests might I have along with this test?  Your healthcare provider will order screening tests for LDL, HDL, and total cholesterol.  What do my test results mean?  Test results may vary depending on your age, gender, health history, the method used for the test, and other things. Your test results may not mean you have a problem. Ask your healthcare provider what your test results mean for you.   Results are given in milligrams per deciliter (mg/dL). Normal levels of triglycerides are less than 150 mg/dL.  Here are how higher numbers are classified:    150 to 199 mg/dL: Borderline high    200 to 499 mg/dL: High    500 mg/dL and above: Very high  If you have a high triglyceride level, you have a greater risk for heart attack and stroke.  A triglyceride level above 150 mg/dL also means that you may have an increased risk for metabolic syndrome. This is a cluster of symptoms including high blood pressure, high blood sugar, and high body fat around the waist. These symptoms have been linked to increased risk for diabetes, heart disease, and stroke.  High triglycerides levels can also be caused by certain diseases or inherited conditions.  If your triglyceride level is above 200 mg/dL, your healthcare provider may recommend that you:    Lose weight    Limit high-fat foods containing saturated fats. These are animal fats found in meat, butter, and whole milk.    Limit trans fats, which are found in many processed foods like chips and store-bought cookies    Cut back on drinks with added sugars, such as soda    Limit your alcohol intake    Stop smoking    Control your blood pressure    Exercise for at least 30 minutes a day, 5 days a week    Limit the calories from fat in your diet to 25% to 35% of your total intake  If your triglycerides are extremely high--above 500 mg/dL--you may have  an added risk for problems with your pancreas. You will likely need medicine to lower your levels along with recommended changes in diet and lifestyle.  How is this test done?  The test is done with a blood sample. A needle is used to draw blood from a vein in your arm or hand.   Does this test pose any risks?  Having a blood test with a needle carries some risks. These include bleeding, infection, bruising, and feeling lightheaded. When the needle pricks your arm or hand, you may feel a slight sting or pain. Afterward, the site may be sore.   What might affect my test results?  Not fasting for the required length of time before the test can affect your results. Certain medicines can affect your results, as can drinking alcohol.  How do I prepare for the test?  If you have this test as part of a cholesterol screening, you will need to not eat or drink anything but water for 9 to 14 hours before the test. Be sure your healthcare provider knows about all medicines, herbs, vitamins, and supplements you are taking. This includes medicines that don't need a prescription and any illicit drugs you may use.    0068-9233 [employer]. [address] [place] [address]. All rights reserved. This information is not intended as a substitute for professional medical care. Always follow your healthcare professional's instructions.      Results for orders placed or performed in visit on 05/31/18   Lipid panel reflex to direct LDL Fasting   Result Value Ref Range    Cholesterol 144 <200 mg/dL    Triglycerides 184 (H) <150 mg/dL    HDL Cholesterol 31 (L) >39 mg/dL    LDL Cholesterol Calculated 76 <100 mg/dL    Non HDL Cholesterol 113 <130 mg/dL   **Basic metabolic panel FUTURE anytime   Result Value Ref Range    Sodium 142 133 - 144 mmol/L    Potassium 4.1 3.4 - 5.3 mmol/L    Chloride 107 94 - 109 mmol/L    Carbon Dioxide 29 20 - 32 mmol/L    Anion Gap 6 3 - 14 mmol/L    Glucose 98 70 - 99 mg/dL    Urea Nitrogen  20 7 - 30 mg/dL    Creatinine 0.90 0.66 - 1.25 mg/dL    GFR Estimate 87 >60 mL/min/1.7m2    GFR Estimate If Black >90 >60 mL/min/1.7m2    Calcium 8.7 8.5 - 10.1 mg/dL     I recommended that he return to clinic for an appointment in February  for his yearly complete physical exam and we will get all of his preventative medical needs taken care of at that time. I also recommended that he have a laboratory appointment 1 week prior to that to do his fasting laboratory work.            Follow-ups after your visit        Follow-up notes from your care team     Return in about 8 months (around 2/7/2019) for Physical Exam.      Future tests that were ordered for you today     Open Future Orders        Priority Expected Expires Ordered    HIV Screening Routine  3/7/2019 6/7/2018    PAF COMPLETED Routine  6/6/2019 6/7/2018    Lipid panel reflex to direct LDL Fasting Routine  3/7/2019 6/7/2018    25 Hydroxyvitamin D2 and D3 Routine  3/7/2019 6/7/2018    Comprehensive metabolic panel Routine  3/7/2019 6/7/2018            Who to contact     If you have questions or need follow up information about today's clinic visit or your schedule please contact [facility] directly at [phone].  Normal or non-critical lab and imaging results will be communicated to you by MyChart, letter or phone within 4 business days after the clinic has received the results. If you do not hear from us within 7 days, please contact the clinic through MyChart or phone. If you have a critical or abnormal lab result, we will notify you by phone as soon as possible.  Submit refill requests through [employer] or call your pharmacy and they will forward the refill request to us. Please allow 3 business days for your refill to be completed.          Additional Information About Your Visit        Care EveryWhere ID     This is your Care EveryWhere ID. This could be used by other organizations to access your [place] medical records  [id number]         Your Vitals Were     Pulse Temperature Respirations Pulse Oximetry BMI (Body Mass Index)       68 98.4  F (36.9  C) (Oral) 20 98% 36.45 kg/m2        Blood Pressure from Last 3 Encounters:   06/07/18 129/74   01/30/18 (!) 145/94   06/13/17 125/83    Weight from Last 3 Encounters:   06/07/18 254 lb (115.2 kg)   01/30/18 274 lb (124.3 kg)   06/13/17 271 lb (122.9 kg)                 Today's Medication Changes          These changes are accurate as of 6/7/18  2:04 PM.  If you have any questions, ask your nurse or doctor.               Stop taking these medicines if you haven't already. Please contact your care team if you have questions.     clarithromycin 500 MG tablet   Commonly known as:  BIAXIN   Stopped by:  Hussein Sanches MD                    Primary Care Provider Office Phone # Fax #    Hussein Sanches [phone] [id number] [facility] [address]        Equal Access to Services     [facility]: Hadii bart walden hadasho Soomaali, waaxda luqadaha, qaybta kaalmada adeegyayesika, jamar tinoco . So [facility] [phone].    ATENCIÓN: Si habla español, tiene a kemp disposición servicios gratuitos de asistencia lingüística. Llame al [phone].    We comply with applicable federal civil rights laws and [place] laws. We do not discriminate on the basis of race, color, national origin, age, disability, sex, sexual orientation, or gender identity.            Thank you!     Thank you for choosing [facility]  for your care. Our goal is always to provide you with excellent care. Hearing back from our patients is one way we can continue to improve our services. Please take a few minutes to complete the written survey that you may receive in the mail after your visit with us. Thank you!             Your Updated Medication List - Protect others around you: Learn how to safely use, store and throw away your medicines at www.disposemymeds.org.          This  list is accurate as of 6/7/18  2:04 PM.  Always use your most recent med list.                   Brand Name Dispense Instructions for use Diagnosis    doxazosin 4 MG tablet    CARDURA    90 tablet    Take 1 tablet (4 mg) by mouth At Bedtime Need to keep upcoming appointment for further refills    Essential hypertension with goal blood pressure less than 140/90, Pedal edema, Abdominal pain, epigastric       flecainide 100 MG tablet    TAMBOCOR          furosemide 40 MG tablet    LASIX    90 tablet    Take 1 tablet (40 mg) by mouth every morning Need to keep upcoming appointment for further refills    Pedal edema, Essential hypertension with goal blood pressure less than 140/90, Abdominal pain, epigastric       lisinopril 40 MG tablet    PRINIVIL/ZESTRIL    90 tablet    Take 1 tablet (40 mg) by mouth daily    Essential hypertension with goal blood pressure less than 140/90, Pedal edema, Abdominal pain, epigastric       metoprolol succinate 100 MG 24 hr tablet    TOPROL-XL    90 tablet    Take 1 tablet (100 mg) by mouth daily    Essential hypertension with goal blood pressure less than 140/90, Pedal edema, Abdominal pain, epigastric       MULTAQ 400 MG Tabs tablet   Generic drug:  dronedarone           omeprazole 40 MG capsule    priLOSEC    90 capsule    Take 1 capsule (40 mg) by mouth daily Take 30-60 minutes before a meal.    Abdominal pain, epigastric, Essential hypertension with goal blood pressure less than 140/90, Pedal edema       rivaroxaban ANTICOAGULANT 20 MG Tabs tablet    XARELTO    90 tablet    Take 1 tablet (20 mg) by mouth daily (with dinner)    Paroxysmal atrial fibrillation (H)       SUMAtriptan 50 MG tablet    IMITREX    27 tablet    Take 1 tablet (50 mg) by mouth at onset of headache May repeat in 2 hours if needed: max 2/day;    Other type of migraine       vitamin D3 2000 units Caps     90 capsule    TAKE 1 CAPSULE BY MOUTH ONCE DAILY    Vitamin D deficiency disease

## 2018-06-07 NOTE — PATIENT INSTRUCTIONS
"  Triglycerides  Does this test have other names?  Lipid panel, fasting lipoprotein panel  What is this test?  This test measures the amount of triglycerides in your blood.  Triglycerides are one of several types of fats in your blood. Other kinds are LDL (\"bad\") cholesterol and HDL (\"good\") cholesterol.  Knowing your triglyceride level is important, especially if you have diabetes, are overweight or a smoker, or are mostly inactive. High triglyceride levels may put you at greater risk for a heart attack or stroke.    This test is part of a group of cholesterol and blood fat tests called a fasting lipoprotein panel, or lipid panel. This panel is recommended for all adults at least once every 5 years, or as recommended by your healthcare provider.  Knowing your triglyceride level helps your healthcare provider suggest healthy changes to your diet or lifestyle. If you have triglycerides that are high to very high, your provider is more likely to prescribe medicines to lower your triglycerides or your LDL cholesterol.  Why do I need this test?  You may need this test as part of a routine checkup. You may also need this test if you're overweight, drink too much alcohol, rarely exercise, or have other conditions like high blood pressure or diabetes.  If you are on cholesterol-lowering medicines, you may have this test to see how well your treatment is working.  What other tests might I have along with this test?  Your healthcare provider will order screening tests for LDL, HDL, and total cholesterol.  What do my test results mean?  Test results may vary depending on your age, gender, health history, the method used for the test, and other things. Your test results may not mean you have a problem. Ask your healthcare provider what your test results mean for you.   Results are given in milligrams per deciliter (mg/dL). Normal levels of triglycerides are less than 150 mg/dL.  Here are how higher numbers are " classified:    150 to 199 mg/dL: Borderline high    200 to 499 mg/dL: High    500 mg/dL and above: Very high  If you have a high triglyceride level, you have a greater risk for heart attack and stroke.  A triglyceride level above 150 mg/dL also means that you may have an increased risk for metabolic syndrome. This is a cluster of symptoms including high blood pressure, high blood sugar, and high body fat around the waist. These symptoms have been linked to increased risk for diabetes, heart disease, and stroke.  High triglycerides levels can also be caused by certain diseases or inherited conditions.  If your triglyceride level is above 200 mg/dL, your healthcare provider may recommend that you:    Lose weight    Limit high-fat foods containing saturated fats. These are animal fats found in meat, butter, and whole milk.    Limit trans fats, which are found in many processed foods like chips and store-bought cookies    Cut back on drinks with added sugars, such as soda    Limit your alcohol intake    Stop smoking    Control your blood pressure    Exercise for at least 30 minutes a day, 5 days a week    Limit the calories from fat in your diet to 25% to 35% of your total intake  If your triglycerides are extremely high--above 500 mg/dL--you may have an added risk for problems with your pancreas. You will likely need medicine to lower your levels along with recommended changes in diet and lifestyle.  How is this test done?  The test is done with a blood sample. A needle is used to draw blood from a vein in your arm or hand.   Does this test pose any risks?  Having a blood test with a needle carries some risks. These include bleeding, infection, bruising, and feeling lightheaded. When the needle pricks your arm or hand, you may feel a slight sting or pain. Afterward, the site may be sore.   What might affect my test results?  Not fasting for the required length of time before the test can affect your results. Certain  medicines can affect your results, as can drinking alcohol.  How do I prepare for the test?  If you have this test as part of a cholesterol screening, you will need to not eat or drink anything but water for 9 to 14 hours before the test. Be sure your healthcare provider knows about all medicines, herbs, vitamins, and supplements you are taking. This includes medicines that don't need a prescription and any illicit drugs you may use.    5811-3691 [employer]. [address] [place] [address], [place] [address]. All rights reserved. This information is not intended as a substitute for professional medical care. Always follow your healthcare professional's instructions.      Results for orders placed or performed in visit on 05/31/18   Lipid panel reflex to direct LDL Fasting   Result Value Ref Range    Cholesterol 144 <200 mg/dL    Triglycerides 184 (H) <150 mg/dL    HDL Cholesterol 31 (L) >39 mg/dL    LDL Cholesterol Calculated 76 <100 mg/dL    Non HDL Cholesterol 113 <130 mg/dL   **Basic metabolic panel FUTURE anytime   Result Value Ref Range    Sodium 142 133 - 144 mmol/L    Potassium 4.1 3.4 - 5.3 mmol/L    Chloride 107 94 - 109 mmol/L    Carbon Dioxide 29 20 - 32 mmol/L    Anion Gap 6 3 - 14 mmol/L    Glucose 98 70 - 99 mg/dL    Urea Nitrogen 20 7 - 30 mg/dL    Creatinine 0.90 0.66 - 1.25 mg/dL    GFR Estimate 87 >60 mL/min/1.7m2    GFR Estimate If Black >90 >60 mL/min/1.7m2    Calcium 8.7 8.5 - 10.1 mg/dL     I recommended that he return to clinic for an appointment in February  for his yearly complete physical exam and we will get all of his preventative medical needs taken care of at that time. I also recommended that he have a laboratory appointment 1 week prior to that to do his fasting laboratory work.

## 2018-07-07 DIAGNOSIS — G43.909 MIGRAINE HEADACHE: Primary | ICD-10-CM

## 2018-07-09 RX ORDER — SUMATRIPTAN 50 MG/1
TABLET, FILM COATED ORAL
Qty: 27 TABLET | Refills: 3 | Status: SHIPPED | OUTPATIENT
Start: 2018-07-09 | End: 2019-03-11

## 2018-09-14 ENCOUNTER — TRANSFERRED RECORDS (OUTPATIENT)
Dept: HEALTH INFORMATION MANAGEMENT | Facility: CLINIC | Age: 53
End: 2018-09-14

## 2018-09-17 PROBLEM — K57.30 DIVERTICULOSIS OF LARGE INTESTINE: Status: ACTIVE | Noted: 2018-09-17

## 2018-10-10 DIAGNOSIS — E55.9 VITAMIN D DEFICIENCY DISEASE: ICD-10-CM

## 2018-10-11 RX ORDER — ACETAMINOPHEN 160 MG
TABLET,DISINTEGRATING ORAL
Qty: 90 CAPSULE | Refills: 0 | Status: SHIPPED | OUTPATIENT
Start: 2018-10-11 | End: 2019-03-11

## 2019-01-28 DIAGNOSIS — E78.5 HYPERLIPIDEMIA LDL GOAL <130: ICD-10-CM

## 2019-01-28 DIAGNOSIS — E55.9 VITAMIN D DEFICIENCY DISEASE: ICD-10-CM

## 2019-01-28 DIAGNOSIS — E78.1 HIGH TRIGLYCERIDES: ICD-10-CM

## 2019-01-28 DIAGNOSIS — K21.9 GASTROESOPHAGEAL REFLUX DISEASE, ESOPHAGITIS PRESENCE NOT SPECIFIED: ICD-10-CM

## 2019-01-28 DIAGNOSIS — Z11.4 SCREENING FOR HUMAN IMMUNODEFICIENCY VIRUS: ICD-10-CM

## 2019-01-28 DIAGNOSIS — I10 HYPERTENSION GOAL BP (BLOOD PRESSURE) < 140/90: ICD-10-CM

## 2019-01-28 DIAGNOSIS — E78.6 HDL DEFICIENCY: ICD-10-CM

## 2019-01-28 LAB
ALBUMIN SERPL-MCNC: 3.9 G/DL (ref 3.4–5)
ALP SERPL-CCNC: 84 U/L (ref 40–150)
ALT SERPL W P-5'-P-CCNC: 34 U/L (ref 0–70)
ANION GAP SERPL CALCULATED.3IONS-SCNC: 6 MMOL/L (ref 3–14)
AST SERPL W P-5'-P-CCNC: 20 U/L (ref 0–45)
BILIRUB SERPL-MCNC: 0.4 MG/DL (ref 0.2–1.3)
BUN SERPL-MCNC: 14 MG/DL (ref 7–30)
CALCIUM SERPL-MCNC: 8.5 MG/DL (ref 8.5–10.1)
CHLORIDE SERPL-SCNC: 109 MMOL/L (ref 94–109)
CHOLEST SERPL-MCNC: 168 MG/DL
CO2 SERPL-SCNC: 27 MMOL/L (ref 20–32)
CREAT SERPL-MCNC: 0.76 MG/DL (ref 0.66–1.25)
GFR SERPL CREATININE-BSD FRML MDRD: >90 ML/MIN/{1.73_M2}
GLUCOSE SERPL-MCNC: 105 MG/DL (ref 70–99)
HDLC SERPL-MCNC: 34 MG/DL
HIV 1+2 AB+HIV1 P24 AG SERPL QL IA: NONREACTIVE
LDLC SERPL CALC-MCNC: 96 MG/DL
NONHDLC SERPL-MCNC: 134 MG/DL
POTASSIUM SERPL-SCNC: 3.5 MMOL/L (ref 3.4–5.3)
PROT SERPL-MCNC: 7.6 G/DL (ref 6.8–8.8)
SODIUM SERPL-SCNC: 142 MMOL/L (ref 133–144)
TRIGL SERPL-MCNC: 188 MG/DL

## 2019-01-28 PROCEDURE — 36415 COLL VENOUS BLD VENIPUNCTURE: CPT | Performed by: FAMILY MEDICINE

## 2019-01-28 PROCEDURE — 80053 COMPREHEN METABOLIC PANEL: CPT | Performed by: FAMILY MEDICINE

## 2019-01-28 PROCEDURE — 80061 LIPID PANEL: CPT | Performed by: FAMILY MEDICINE

## 2019-01-28 PROCEDURE — 82306 VITAMIN D 25 HYDROXY: CPT | Performed by: FAMILY MEDICINE

## 2019-01-28 PROCEDURE — 87389 HIV-1 AG W/HIV-1&-2 AB AG IA: CPT | Performed by: FAMILY MEDICINE

## 2019-01-28 NOTE — RESULT ENCOUNTER NOTE
I have reviewed the schedule of future appointments and this patient has an appointment scheduled for 2-5-2019.    Visit date not found

## 2019-01-31 LAB
DEPRECATED CALCIDIOL+CALCIFEROL SERPL-MC: <38 UG/L (ref 20–75)
VITAMIN D2 SERPL-MCNC: <5 UG/L
VITAMIN D3 SERPL-MCNC: 33 UG/L

## 2019-02-01 NOTE — PATIENT INSTRUCTIONS
Preventive Health Recommendations  Male Ages 50 - 64    Yearly exam:             See your health care provider every year in order to  o   Review health changes.   o   Discuss preventive care.    o   Review your medicines if your doctor has prescribed any.     Have a cholesterol test every 5 years, or more frequently if you are at risk for high cholesterol/heart disease.     Have a diabetes test (fasting glucose) every three years. If you are at risk for diabetes, you should have this test more often.     Have a colonoscopy at age 50, or have a yearly FIT test (stool test). These exams will check for colon cancer.      Talk with your health care provider about whether or not a prostate cancer screening test (PSA) is right for you.    You should be tested each year for STDs (sexually transmitted diseases), if you re at risk.     Shots: Get a flu shot each year. Get a tetanus shot every 10 years.     Nutrition:    Eat at least 5 servings of fruits and vegetables daily.     Eat whole-grain bread, whole-wheat pasta and brown rice instead of white grains and rice.     Get adequate Calcium and Vitamin D.     Lifestyle    Exercise for at least 150 minutes a week (30 minutes a day, 5 days a week). This will help you control your weight and prevent disease.     Limit alcohol to one drink per day.     No smoking.     Wear sunscreen to prevent skin cancer.     See your dentist every six months for an exam and cleaning.     See your eye doctor every 1 to 2 years.        Patient Education     Prediabetes  You have been diagnosed with prediabetes. This means that the level of sugar (glucose) in your blood is too high. If you have prediabetes, you are at risk for developing type 2 diabetes. Type 2 diabetes is diagnosed when the level of glucose in the blood reaches a certain high level. With prediabetes, it hasn t reached this point yet, but it is higher than normal. It is vital to make lifestyle changes to lower your blood  sugar, improve your health, and prevent diabetes. This sheet will tell you more.      Why worry about prediabetes?  Prediabetes is a disease where the body s cells have trouble using glucose in the blood for energy. As a result, too much glucose stays in the blood and can affect how your heart and blood vessels work. Without changes in diet and lifestyle, the problem can get worse. Once you have type 2 diabetes, it is chronic (ongoing) and needs to be managed for the rest of your life. Diabetes can harm the body and your health by damaging organs, such as your eyes and kidneys. It makes you more likely to have heart disease. And it can damage nerves and blood vessels.  Who is a risk for prediabetes?  The exact cause of prediabetes is not clear. But certain risk factors make a person more likely to have it. These include:    A family history of type 2 diabetes    Being overweight    Being over age 45    Have hypertension or elevated cholesterol     Having had gestational diabetes    Not being physically active    Being ,  American, , , , or   Diagnosing prediabetes  Prediabetes may have no symptoms or you may have some of the symptoms of diabetes. The diagnosis is made with a blood test. You may have one or more of these blood tests:     Fasting glucose test. Blood is taken and tested after you have fasted (not eaten) for at least 8 hours. A normal test result is 99 milligrams per deciliter (mg/dL) or lower. Prediabetes is 100 mg/dL to 125 mg/dL. Diabetes is 126 mg/dL or higher.    Glucose tolerance test. Your blood sugar is measured before and after you drink a very sugary liquid. A normal test result is 139 milligrams per deciliter (mg/dL) or lower. Prediabetes is 140 mg/dL to 199 mg/dL. Diabetes is 200 mg/dL or higher.    Hemoglobin A1c (HbA1c). Your HbA1c is normal if it is below 5.7%. Prediabetes is 5.7% to 6.4%. Diabetes is 6.5% or  "higher.   Treating prediabetes  The best way to treat prediabetes is to lose at least 5% to 7% of your current weight and be more physically active by getting at least 150 minutes a week of physical activity. When sitting for long periods of time, get up for short sessions of light activity every 30 minutes. These changes help the body s cells use blood sugar better. Even a small amount of weight loss can help. Work with your healthcare provider to make a plan to eat well and be more active. Keep in mind that small changes can add up. Other changes in your lifestyle (or even taking certain medicines, such as metformin) may make you less likely to develop diabetes. Your healthcare provider can talk with you about these.  Follow-up  If it is untreated, prediabetes can turn into diabetes. This is a serious health condition. Take steps to stop this from happening. Follow the treatment plan you have been given. You may have your blood glucose tested again in about 12 to 18 months.  Symptoms of diabetes  Let your healthcare provider know if you have any of the following:    Always feel very tired    Feel very thirsty or hungry much of the time    Have to urinate often    Lose weight for no reason    Feel numbness or tingling in your fingers or toes    Have cuts or bruises that don t seem to heal    Have blurry vision   Date Last Reviewed: 5/1/2016 2000-2018 [employer]. [address] [place] [address], [place] [address]. All rights reserved. This information is not intended as a substitute for professional medical care. Always follow your healthcare professional's instructions.           Patient Education     Triglycerides  Does this test have other names?  Lipid panel, fasting lipoprotein panel  What is this test?  This test measures the amount of triglycerides in your blood.  Triglycerides are one of several types of fats in your blood. Other kinds are LDL (\"bad\") cholesterol and HDL (\"good\") " cholesterol.  Knowing your triglyceride level is important, especially if you have diabetes, are overweight or a smoker, or are mostly inactive. High triglyceride levels may put you at greater risk for a heart attack or stroke.    This test is part of a group of cholesterol and blood fat tests called a fasting lipoprotein panel, or lipid panel. This panel is recommended for all adults at least once every 5 years, or as recommended by your healthcare provider.  Knowing your triglyceride level helps your healthcare provider suggest healthy changes to your diet or lifestyle. If you have triglycerides that are high to very high, your provider is more likely to prescribe medicines to lower your triglycerides or your LDL cholesterol.  Why do I need this test?  You may need this test as part of a routine checkup. You may also need this test if you're overweight, drink too much alcohol, rarely exercise, or have other conditions like high blood pressure or diabetes.  If you are on cholesterol-lowering medicines, you may have this test to see how well your treatment is working.  What other tests might I have along with this test?  Your healthcare provider will order screening tests for LDL, HDL, and total cholesterol.  What do my test results mean?  Test results may vary depending on your age, gender, health history, the method used for the test, and other things. Your test results may not mean you have a problem. Ask your healthcare provider what your test results mean for you.   Results are given in milligrams per deciliter (mg/dL). Normal levels of triglycerides are less than 150 mg/dL.  Here are how higher numbers are classified:    150 to 199 mg/dL: Borderline high    200 to 499 mg/dL: High    500 mg/dL and above: Very high  If you have a high triglyceride level, you have a greater risk for heart attack and stroke.  A triglyceride level above 150 mg/dL also means that you may have an increased risk for metabolic  syndrome. This is a cluster of symptoms including high blood pressure, high blood sugar, and high body fat around the waist. These symptoms have been linked to increased risk for diabetes, heart disease, and stroke.  High triglycerides levels can also be caused by certain diseases or inherited conditions.  If your triglyceride level is above 200 mg/dL, your healthcare provider may recommend that you:    Lose weight    Limit high-fat foods containing saturated fats. These are animal fats found in meat, butter, and whole milk.    Limit trans fats, which are found in many processed foods like chips and store-bought cookies    Cut back on drinks with added sugars, such as soda    Limit your alcohol intake    Stop smoking    Control your blood pressure    Exercise for at least 30 minutes a day, 5 days a week    Limit the calories from fat in your diet to 25% to 35% of your total intake  If your triglycerides are extremely high--above 500 mg/dL--you may have an added risk for problems with your pancreas. You will likely need medicine to lower your levels along with recommended changes in diet and lifestyle.  How is this test done?  The test is done with a blood sample. A needle is used to draw blood from a vein in your arm or hand.   Does this test pose any risks?  Having a blood test with a needle carries some risks. These include bleeding, infection, bruising, and feeling lightheaded. When the needle pricks your arm or hand, you may feel a slight sting or pain. Afterward, the site may be sore.   What might affect my test results?  Not fasting for the required length of time before the test can affect your results. Certain medicines can affect your results, as can drinking alcohol.  How do I prepare for the test?  If you have this test as part of a cholesterol screening, you will need to not eat or drink anything but water for 9 to 14 hours before the test. Be sure your healthcare provider knows about all medicines,  herbs, vitamins, and supplements you are taking. This includes medicines that don't need a prescription and any illicit drugs you may use.    1850-8935 [employer]. [address] [place] [address], [place] [address]. All rights reserved. This information is not intended as a substitute for professional medical care. Always follow your healthcare professional's instructions.           Patient Education     Preventing Osteoporosis: Meeting Your Calcium Needs    Your body needs calcium to build and repair bones. But it can't make calcium on its own. That's why it's important to eat calcium-rich foods. Some foods are naturally rich in calcium. Others have calcium added (fortified). It's best to get calcium from the foods you eat. But if you can't get enough, you may want to take calcium supplements. To meet your daily calcium needs, try the foods listed below.  Dairy Fish & beans Other sources   Source   Calcium (mg) per serving   Source   Calcium (mg) per serving   Source   Calcium (mg) per serving   Low-fat yogurt, plain   415 mg/8 oz.   Sardines, [place], canned, with bones   351 mg/3 oz.   Oatmeal, instant, fortified   215 mg/1 cup   Nonfat milk   302 mg/1 cup   [place], sockeye, canned, with bones   239 mg/3 oz.   Tofu made with calcium sulfate   204 mg/3 oz.   Low-fat milk   297 mg/1 cup   Soybeans, fresh, boiled   131 mg/1/2 cup   Collards   179 mg/1/2 cup   Swiss cheese   272 mg/1 oz.   White beans, cooked   81 mg/1/2 cup   English muffin, whole wheat   175 mg/1 muffin   Cheddar cheese   205 mg/1 oz.   Navy beans, cooked   79 mg/1/2 cup   Kale   90 mg/1/2 cup   Ice cream strawberry   79 mg/1/2 cup           Orange, navel   56 mg/1 medium   Note: Calcium levels may vary depending on brand and size.  Daily calcium needs  14 to 18 years old: 1,300 mg  19 to 30 years old: 1,000 mg  31 to 50 years old: 1,000 mg  51 to 70 years old, women: 1,200 mg  51 to 70 years old, men: 1,000 mg  Pregnant or nursin to 18  years old: 1,300 mg, 19 to 50 years old: 1,000 mg  Older than 70 (women and men): 1,200 mg   Date Last Reviewed: 5/1/2018 2000-2018 [employer]. [address] [place] [address], [place] [address]. All rights reserved. This information is not intended as a substitute for professional medical care. Always follow your healthcare professional's instructions.           Results for orders placed or performed in visit on 01/28/19   Comprehensive metabolic panel   Result Value Ref Range    Sodium 142 133 - 144 mmol/L    Potassium 3.5 3.4 - 5.3 mmol/L    Chloride 109 94 - 109 mmol/L    Carbon Dioxide 27 20 - 32 mmol/L    Anion Gap 6 3 - 14 mmol/L    Glucose 105 (H) 70 - 99 mg/dL    Urea Nitrogen 14 7 - 30 mg/dL    Creatinine 0.76 0.66 - 1.25 mg/dL    GFR Estimate >90 >60 mL/min/[1.73_m2]    GFR Estimate If Black >90 >60 mL/min/[1.73_m2]    Calcium 8.5 8.5 - 10.1 mg/dL    Bilirubin Total 0.4 0.2 - 1.3 mg/dL    Albumin 3.9 3.4 - 5.0 g/dL    Protein Total 7.6 6.8 - 8.8 g/dL    Alkaline Phosphatase 84 40 - 150 U/L    ALT 34 0 - 70 U/L    AST 20 0 - 45 U/L   25 Hydroxyvitamin D2 and D3   Result Value Ref Range    25 OH Vit D2 <5 ug/L    25 OH Vit D3 33 ug/L    25 OH Vit D total <38 20 - 75 ug/L   Lipid panel reflex to direct LDL Fasting   Result Value Ref Range    Cholesterol 168 <200 mg/dL    Triglycerides 188 (H) <150 mg/dL    HDL Cholesterol 34 (L) >39 mg/dL    LDL Cholesterol Calculated 96 <100 mg/dL    Non HDL Cholesterol 134 (H) <130 mg/dL   HIV Screening   Result Value Ref Range    HIV Antigen Antibody Combo Nonreactive NR^Nonreactive         Please call our [facility] Scheduling Line at [phone] to schedule your:  CT scan

## 2019-02-05 ENCOUNTER — OFFICE VISIT (OUTPATIENT)
Dept: FAMILY MEDICINE | Facility: CLINIC | Age: 54
End: 2019-02-05
Payer: COMMERCIAL

## 2019-02-05 VITALS
DIASTOLIC BLOOD PRESSURE: 80 MMHG | OXYGEN SATURATION: 96 % | WEIGHT: 255 LBS | RESPIRATION RATE: 20 BRPM | SYSTOLIC BLOOD PRESSURE: 132 MMHG | HEART RATE: 72 BPM | HEIGHT: 70 IN | BODY MASS INDEX: 36.51 KG/M2 | TEMPERATURE: 96.8 F

## 2019-02-05 DIAGNOSIS — S06.9X9A: ICD-10-CM

## 2019-02-05 DIAGNOSIS — Z00.00 ROUTINE GENERAL MEDICAL EXAMINATION AT A HEALTH CARE FACILITY: Primary | ICD-10-CM

## 2019-02-05 DIAGNOSIS — R91.1 LUNG NODULE SEEN ON IMAGING STUDY: ICD-10-CM

## 2019-02-05 DIAGNOSIS — E66.01 MORBID OBESITY (H): ICD-10-CM

## 2019-02-05 PROCEDURE — 99396 PREV VISIT EST AGE 40-64: CPT | Performed by: FAMILY MEDICINE

## 2019-02-05 ASSESSMENT — ENCOUNTER SYMPTOMS
ARTHRALGIAS: 0
CHILLS: 0
FREQUENCY: 1
NERVOUS/ANXIOUS: 0
CONSTIPATION: 0
DIARRHEA: 0
PALPITATIONS: 0
JOINT SWELLING: 0
MYALGIAS: 1
NAUSEA: 1
COUGH: 0
WEAKNESS: 0
HEMATURIA: 0
ABDOMINAL PAIN: 1
DYSURIA: 0
SHORTNESS OF BREATH: 0
FEVER: 0
EYE PAIN: 0
HEMATOCHEZIA: 0
HEARTBURN: 0
DIZZINESS: 1
PARESTHESIAS: 0
HEADACHES: 1
SORE THROAT: 0

## 2019-02-05 ASSESSMENT — PAIN SCALES - GENERAL: PAINLEVEL: NO PAIN (0)

## 2019-02-05 ASSESSMENT — MIFFLIN-ST. JEOR: SCORE: 1998.95

## 2019-02-05 NOTE — PROGRESS NOTES
eiroSUBJECTIVE:   CC: Loy Worthington is an 53 year old male who presents for preventative health visit.     Physical   Annual:     Getting at least 3 servings of Calcium per day:  Yes    Bi-annual eye exam:  Yes    Dental care twice a year:  Yes    Sleep apnea or symptoms of sleep apnea:  None    Diet:  Low salt    Frequency of exercise:  None    Taking medications regularly:  Yes    Medication side effects:  Not applicable    Additional concerns today:  Yes    PHQ-2 Total Score: 0              Today's PHQ-2 Score:   PHQ-2 ( 1999 [employer]izer) 2/5/2019   Q1: Little interest or pleasure in doing things 0   Q2: Feeling down, depressed or hopeless 0   PHQ-2 Score 0   Q1: Little interest or pleasure in doing things Not at all   Q2: Feeling down, depressed or hopeless Not at all   PHQ-2 Score 0       Abuse: Current or Past(Physical, Sexual or Emotional)- No  Do you feel safe in your environment? Yes    Social History     Tobacco Use     Smoking status: Never Smoker     Smokeless tobacco: Never Used     Tobacco comment: Lives in smoke free household   Substance Use Topics     Alcohol use: No     Alcohol Use 2/5/2019   If you drink alcohol do you typically have greater than 3 drinks per day OR greater than 7 drinks per week? Not Applicable       Last PSA:   PSA   Date Value Ref Range Status   01/30/2018 1.86 0 - 4 ug/L Final     Comment:     Assay Method:  Chemiluminescence using Siemens Vista analyzer       Reviewed orders with patient. Reviewed health maintenance and updated orders accordingly -       Reviewed and updated as needed this visit by clinical staff  Tobacco  Allergies  Meds  Med Hx  Surg Hx  Fam Hx  Soc Hx        Reviewed and updated as needed this visit by Provider            Review of Systems   Constitutional: Negative for chills and fever.   HENT: Positive for hearing loss. Negative for congestion, ear pain and sore throat.    Eyes: Positive for visual disturbance. Negative for pain.   Respiratory: Negative  "for cough and shortness of breath.    Cardiovascular: Positive for peripheral edema. Negative for chest pain and palpitations.   Gastrointestinal: Positive for abdominal pain and nausea. Negative for constipation, diarrhea, heartburn and hematochezia.   Genitourinary: Positive for frequency. Negative for discharge, dysuria, genital sores, hematuria, impotence and urgency.   Musculoskeletal: Positive for myalgias. Negative for arthralgias and joint swelling.   Skin: Negative for rash.   Neurological: Positive for dizziness and headaches. Negative for weakness and paresthesias.   Psychiatric/Behavioral: Negative for mood changes. The patient is not nervous/anxious.          OBJECTIVE:   /80   Pulse 72   Temp 96.8  F (36  C) (Oral)   Resp 20   Ht 1.772 m (5' 9.75\")   Wt 115.7 kg (255 lb)   SpO2 96%   BMI 36.85 kg/m      Physical Exam      Diagnostic Test Results:  none     ASSESSMENT/PLAN:       ICD-10-CM    1. Routine general medical examination at a health care facility Z00.00    2. Lung nodule seen on imaging study R91.1 CT Chest w/o Contrast   3. Morbid obesity (H) E66.01    4. Brain injury with loss of consciousness, initial encounter (H) S06.9X9A NEUROLOGY ADULT REFERRAL       COUNSELING:   Reviewed preventive health counseling, as reflected in patient instructions       Regular exercise       Healthy diet/nutrition       Vision screening       Immunizations    Declined: Influenza and Zoster due to Other                Family planning       HIV screeninx in teen years, 1x in adult years, and at intervals if high risk       Colon cancer screening       Prostate cancer screening       Osteoporosis Prevention/Bone Health    BP Readings from Last 1 Encounters:   19 132/80     Estimated body mass index is 36.85 kg/m  as calculated from the following:    Height as of this encounter: 1.772 m (5' 9.75\").    Weight as of this encounter: 115.7 kg (255 lb).      Weight management plan: Discussed " healthy diet and exercise guidelines     reports that  has never smoked. he has never used smokeless tobacco.      Counseling Resources:  ATP IV Guidelines  Pooled Cohorts Equation Calculator  FRAX Risk Assessment  ICSI Preventive Guidelines  Dietary Guidelines for Americans, 2010  USDA's MyPlate  ASA Prophylaxis  Lung CA Screening    Hussein Sanches MD  [facility]  --------------------------------------------------------------------------------------------------------------------------------------    SUBJECTIVE:  Loy Worthington is a 53 year old male who presents to the clinic today for a routine physical exam.    The patient's last physical was 1 years ago.     Cholesterol   Date Value Ref Range Status   01/28/2019 168 <200 mg/dL Final   05/31/2018 144 <200 mg/dL Final     HDL Cholesterol   Date Value Ref Range Status   01/28/2019 34 (L) >39 mg/dL Final   05/31/2018 31 (L) >39 mg/dL Final     LDL Cholesterol Calculated   Date Value Ref Range Status   01/28/2019 96 <100 mg/dL Final     Comment:     Desirable:       <100 mg/dl   05/31/2018 76 <100 mg/dL Final     Comment:     Desirable:       <100 mg/dl     Triglycerides   Date Value Ref Range Status   01/28/2019 188 (H) <150 mg/dL Final     Comment:     Borderline high:  150-199 mg/dl  High:             200-499 mg/dl  Very high:       >499 mg/dl  Fasting specimen     05/31/2018 184 (H) <150 mg/dL Final     Comment:     Borderline high:  150-199 mg/dl  High:             200-499 mg/dl  Very high:       >499 mg/dl  Fasting specimen       Cholesterol/HDL Ratio   Date Value Ref Range Status   04/08/2015 5.3 (H) 0.0 - 5.0 Final   09/18/2014 5.4 (H) 0.0 - 5.0 Final     The patient's last fasting lipid panel was done 1 weeks ago and the results are listed above.        The 10-year ASCVD risk score (Rafael TALLEY Jr., et al., 2013) is: 7.2%    Values used to calculate the score:      Age: 53 years      Sex: Male      Is Non- : No       Diabetic: No      Tobacco smoker: No      Systolic Blood Pressure: 132 mmHg      Is BP treated: Yes      HDL Cholesterol: 34 mg/dL      Total Cholesterol: 168 mg/dL        The patient reports that he has been treated for high blood pressure.    The patient reports that he does not take a daily aspirin.    Lab Results   Component Value Date    HCVAB Negative 03/18/2014     The patient reports that he has been screened for Hepatitis C    (Screen all baby boomers once per CDC-- the generation born from 1945 through 1965)  He would not like to have an Hepatitis C test today    Lab Results   Component Value Date    HIAGAB Nonreactive 01/28/2019     The patient reports that he has been screened for HIV   (Screen all 15 to 64 years old)  He would not like to have an HIV test today      Immunization History   Administered Date(s) Administered     HepB 10/15/2001, 11/15/2001, 11/28/2012     Influenza (IIV3) PF 11/10/2008     Mantoux Tuberculin Skin Test 05/15/1986, 10/15/2001     TD (ADULT, 7+) 09/25/2001, 11/15/2001     TDAP Vaccine (Adacel) 09/10/2012     The patient's believes that his last tetanus shot was given 7 year(s) ago.   The patient believes that he has not had a Shingrix in the past  The patient believes that he has not had a PPSV23 in the past.  The patient believes that he has not had a PCV13 in the past.  The patient believes that he has not had a seasonal flu vaccination this fall or winter.  The patient would like to have a no vaccinations today      No results found for this or any previous visit.]   The patient denies a family history of colon cancer.  The patient reports that he has had a colonoscopy. His  last colonoscopy was in 2018 and he  report that is was normal. The patient was told to have this repeated in 5 years.      The patient reports that he and his wife or partner are not using contraception as she has gone through menopause.   He is not interested in a vasectomy in the near future.  The  patient denies a family history of diabetes. He has lost 40 pounds via his diet and avoiding sweets and flour.  The patient denies a family history of prostate cancer. After discussing the pros and cons of checking a PSA he  Does not want to have this test drawn today.   The patient reports that he eats or drinks 1 servings of dairy products per day. He does not take a calcium supplement at all.  The patient reports that he has dental appointments approximately every 6 months.  The patient reports that he  has an eye examination approximately every 1.0 year(s).    Do you currently smoke? No  How many years have you smoked? 0   How many packs per day did you smoke on average? N/A  (if more than 30 pack year history and the patient is age 55-80 consider ordering an annual low dose radiation lung CT to screen for cancer)  (Do not order if patient has quit more than 15 years ago or has a health condition that limits life expectancy or could not tolerate curative lung surgery)  Are you interested having a lung CT to screen for lung cancer? N/A    If the patient has smoked more that 100 cigarettes, has the patient had an imaging study (US or CT) for an AAA between the ages of 65 and 75? N/A          Patient Active Problem List   Diagnosis     Brain injury (H)     Renal mass     Migraine headache     CARDIOVASCULAR SCREENING; LDL GOAL LESS THAN 130     Hypertension goal BP (blood pressure) < 140/90     Bilateral corneal scars     High triglycerides     Familial hypoalphalipoproteinemia     Migraine     Vitamin D deficiency     Hyperlipidemia LDL goal <130     Vitamin D deficiency disease     GERD (gastroesophageal reflux disease)     Tubular adenoma of colon     Paroxysmal atrial fibrillation (H)     Hematospermia     Lung nodule, solitary, Needs chest CT in Jan 2017     S/P laparoscopic cholecystectomy     Pulmonary nodule, right lung base needs fu scan     HDL deficiency     Diverticulosis of large intestine       Past  Surgical History:   Procedure Laterality Date     CHOLECYSTECTOMY       ENDOSCOPY  12/12/2005    upper GI endoscopy     ESOPHAGOSCOPY, GASTROSCOPY, DUODENOSCOPY (EGD), COMBINED N/A 1/25/2016    Procedure: COMBINED ESOPHAGOSCOPY, GASTROSCOPY, DUODENOSCOPY (EGD);  Surgeon: David Campos MD;  Location: [facility] OR     ESOPHAGOSCOPY, GASTROSCOPY, DUODENOSCOPY (EGD), COMBINED N/A 1/25/2016    Procedure: COMBINED ESOPHAGOSCOPY, GASTROSCOPY, DUODENOSCOPY (EGD), BIOPSY SINGLE OR MULTIPLE;  Surgeon: David Campos MD;  Location: [facility] OR     H ABLATION ATRIAL FLUTTER  May 2016    Dr Cardenas at [facility]     NASAL/SINUS POLYPECTOMY      Nasal-Sinus Polypectomy     SURGICAL HISTORY OF -       cale holes, subdural due to fall     SURGICAL HISTORY OF -       ?tympanoplasty/mastoid with brain trauma       Family History   Problem Relation Age of Onset     Hypertension Father      Hypertension Brother      Macular Degeneration No family hx of      Glaucoma No family hx of      Thyroid Disease No family hx of      Cancer No family hx of      Diabetes No family hx of      Cerebrovascular Disease No family hx of        Social History     Socioeconomic History     Marital status:      Spouse name: Not on file     Number of children: Not on file     Years of education: Not on file     Highest education level: Not on file   Social Needs     Financial resource strain: Not on file     Food insecurity - worry: Not on file     Food insecurity - inability: Not on file     Transportation needs - medical: Not on file     Transportation needs - non-medical: Not on file   Occupational History     Not on file   Tobacco Use     Smoking status: Never Smoker     Smokeless tobacco: Never Used     Tobacco comment: Lives in smoke free household   Substance and Sexual Activity     Alcohol use: No     Drug use: No     Sexual activity: Yes     Partners: Female   Other Topics Concern     Parent/sibling w/ CABG, MI or angioplasty before 65F 55M?  "Not Asked   Social History Narrative     Not on file       Current Outpatient Medications   Medication Sig Dispense Refill     Cholecalciferol (VITAMIN D3) 2000 units CAPS TAKE 1 CAPSULE BY MOUTH EVERY DAY 90 capsule 0     doxazosin (CARDURA) 4 MG tablet Take 1 tablet (4 mg) by mouth At Bedtime Need to keep upcoming appointment for further refills 90 tablet 3     furosemide (LASIX) 40 MG tablet Take 1 tablet (40 mg) by mouth every morning Need to keep upcoming appointment for further refills 90 tablet 3     lisinopril (PRINIVIL/ZESTRIL) 40 MG tablet Take 1 tablet (40 mg) by mouth daily 90 tablet 3     metoprolol succinate (TOPROL-XL) 100 MG 24 hr tablet Take 1 tablet (100 mg) by mouth daily 90 tablet 3     omeprazole (PRILOSEC) 40 MG capsule Take 1 capsule (40 mg) by mouth daily Take 30-60 minutes before a meal. 90 capsule 3     rivaroxaban ANTICOAGULANT (XARELTO) 20 MG TABS tablet Take 1 tablet (20 mg) by mouth daily (with dinner) 90 tablet 1     SUMAtriptan (IMITREX) 50 MG tablet TAKE 1 TABLET BY MOUTH AT ONSET OF HEADACHE AND MAY REPEAT IN 2 HOURS IF NEEDED, MAX 2 TABLETS DAILY 27 tablet 3       PHYSICAL EXAMINATION:  Blood pressure 132/80, pulse 72, temperature 96.8  F (36  C), temperature source Oral, resp. rate 20, height 1.772 m (5' 9.75\"), weight 115.7 kg (255 lb), SpO2 96 %.  General appearance - healthy, alert and no distress  Skin - Skin color, texture, turgor normal. No rashes or lesions.  Head - Normocephalic. No masses, lesions, tenderness or abnormalities  Eyes - conjunctivae/corneas clear. PERRL, EOM's intact. Fundi benign  Ears - External ears normal. Canals clear. TM's normal.  Nose/Sinuses - Nares normal. Septum midline. Mucosa normal. No drainage or sinus tenderness.  Oropharynx - Lips, mucosa, and tongue normal. Teeth and gums normal.  Neck - Neck supple. No adenopathy. Thyroid symmetric, normal size,  Lungs - Percussion normal. Good diaphragmatic excursion. Lungs clear  Heart - PMI normal. No " lifts, heaves, or thrills. RRR. No murmurs, clicks gallops or rub  Abdomen - Abdomen soft, non-tender. BS normal. No masses, organomegaly  Extremities - Extremities normal. No deformities, edema, or skin discoloration.  Musculoskeletal - Spine ROM normal. Muscular strength intact.  Peripheral pulses - radial=4/4, femoral=4/4, popliteal=4/4, dorsalis pedis=4/4,  Neuro - Gait normal. Reflexes normal and symmetric. Sensation grossly WNL.  Genitalia - Penis normal. No urethral discharge. Scrotum normal to palpation. No hernia.  Rectal - Rectal negative. Prostate palpation negative.  No rectal masses or abnormalities      Orders Only on 01/28/2019   Component Date Value Ref Range Status     Sodium 01/28/2019 142  133 - 144 mmol/L Final     Potassium 01/28/2019 3.5  3.4 - 5.3 mmol/L Final     Chloride 01/28/2019 109  94 - 109 mmol/L Final     Carbon Dioxide 01/28/2019 27  20 - 32 mmol/L Final     Anion Gap 01/28/2019 6  3 - 14 mmol/L Final     Glucose 01/28/2019 105* 70 - 99 mg/dL Final    Fasting specimen     Urea Nitrogen 01/28/2019 14  7 - 30 mg/dL Final     Creatinine 01/28/2019 0.76  0.66 - 1.25 mg/dL Final     GFR Estimate 01/28/2019 >90  >60 mL/min/[1.73_m2] Final    Comment: Non  GFR Calc  Starting 12/18/2018, serum creatinine based estimated GFR (eGFR) will be   calculated using the Chronic Kidney Disease Epidemiology Collaboration   (CKD-EPI) equation.       GFR Estimate If Black 01/28/2019 >90  >60 mL/min/[1.73_m2] Final    Comment:  GFR Calc  Starting 12/18/2018, serum creatinine based estimated GFR (eGFR) will be   calculated using the Chronic Kidney Disease Epidemiology Collaboration   (CKD-EPI) equation.       Calcium 01/28/2019 8.5  8.5 - 10.1 mg/dL Final     Bilirubin Total 01/28/2019 0.4  0.2 - 1.3 mg/dL Final     Albumin 01/28/2019 3.9  3.4 - 5.0 g/dL Final     Protein Total 01/28/2019 7.6  6.8 - 8.8 g/dL Final     Alkaline Phosphatase 01/28/2019 84  40 - 150 U/L Final      ALT 01/28/2019 34  0 - 70 U/L Final     AST 01/28/2019 20  0 - 45 U/L Final     25 OH Vit D2 01/28/2019 <5  ug/L Final     25 OH Vit D3 01/28/2019 33  ug/L Final     25 OH Vit D total 01/28/2019 <38  20 - 75 ug/L Final    Comment: Season, race, dietary intake, and treatment affect the concentration of   25-hydroxy-Vitamin D. Values may decrease during winter months and increase   during summer months. Values 20-29 ug/L may indicate Vitamin D insufficiency   and values <20 ug/L may indicate Vitamin D deficiency.  This test was developed and its performance characteristics determined by the   [facility],  [facility]. It has   not been cleared or approved by the FDA. The laboratory is regulated under   CLIA as qualified to perform high-complexity testing. This test is used for   clinical purposes. It should not be regarded as investigational or for   research.       Cholesterol 01/28/2019 168  <200 mg/dL Final     Triglycerides 01/28/2019 188* <150 mg/dL Final    Comment: Borderline high:  150-199 mg/dl  High:             200-499 mg/dl  Very high:       >499 mg/dl  Fasting specimen       HDL Cholesterol 01/28/2019 34* >39 mg/dL Final     LDL Cholesterol Calculated 01/28/2019 96  <100 mg/dL Final    Desirable:       <100 mg/dl     Non HDL Cholesterol 01/28/2019 134* <130 mg/dL Final    Comment: Above Desirable:  130-159 mg/dl  Borderline high:  160-189 mg/dl  High:             190-219 mg/dl  Very high:       >219 mg/dl       HIV Antigen Antibody Combo 01/28/2019 Nonreactive  NR^Nonreactive     Final    HIV-1 p24 Ag & HIV-1/HIV-2 Ab Not Detected       ASSESSMENT:    ICD-10-CM    1. Routine general medical examination at a health care facility Z00.00        Well-Adult Physical Exam.  Health Maintenance Due   Topic Date Due     ZOSTER IMMUNIZATION (1 of 2) 02/17/2014     INFLUENZA VACCINE (1) 09/01/2018     PHQ-2 Q1 YR  01/30/2019     EYE EXAM Q1 YEAR  02/06/2019     Health  Maintenance   Topic Date Due     ZOSTER IMMUNIZATION (1 of 2) 02/17/2014     INFLUENZA VACCINE (1) 09/01/2018     PHQ-2 Q1 YR  01/30/2019     EYE EXAM Q1 YEAR  02/06/2019     DTAP/TDAP/TD IMMUNIZATION (3 - Td) 09/10/2022     COLONOSCOPY Q5 YR  09/14/2023     LIPID SCREEN Q5 YR MALE (SYSTEM ASSIGNED)  01/28/2024     MIGRAINE ACTION PLAN  Completed     HIV SCREEN (SYSTEM ASSIGNED)  Completed     HEPATITIS C SCREENING  Completed     IPV IMMUNIZATION  Aged Out     MENINGITIS IMMUNIZATION  Aged Out         HEALTH CARE MAINTENENCE: The recommended screening tests and vaccinatons for this patient have been discussed as above.  The appropriate tests and vaccinations  have been ordered or declined by the patient. Please see the orders in EPIC.The patient specifically declines: influenza and shingrix    Immunization Status:  up to date and documented except for as above    Patient Active Problem List   Diagnosis     Brain injury (H)     Renal mass     Migraine headache     CARDIOVASCULAR SCREENING; LDL GOAL LESS THAN 130     Hypertension goal BP (blood pressure) < 140/90     Bilateral corneal scars     High triglycerides     Familial hypoalphalipoproteinemia     Migraine     Vitamin D deficiency     Hyperlipidemia LDL goal <130     Vitamin D deficiency disease     GERD (gastroesophageal reflux disease)     Tubular adenoma of colon     Paroxysmal atrial fibrillation (H)     Hematospermia     Lung nodule, solitary, Needs chest CT in Jan 2017     S/P laparoscopic cholecystectomy     Pulmonary nodule, right lung base needs fu scan     HDL deficiency     Diverticulosis of large intestine        ATP III Guidelines  ICSI Preventive Guidelines    PLAN:   Diet and exercise changes recommend(ed) to improve his HDL and triglycerides   Shingrix and influenza vaccination(s) recommend(ed) but declined by the patient   Follow up on lung nodule- CT ordered   IMPAIRED GLUCOSE TOLERANCE- An educational reference regarding this subject was  printed from [employer] and given to the patient.    Shingrix recommended  Flu shot recommended  Checking a PSA was discussed but the pt declined  Discussed calcium intake, vitamins and supplements. Recommended 1000 mg of calcium daily  Weight loss through diet and exercise was recommended  Sunscreen use was recommended especially in the area of tatoos  Recommended dental exams every 6 months  Recommended eye exam every 1-2 years  Follow up in 1 year for the next preventative medical visit      Body mass index is 36.85 kg/m .      The patient had a serious TBI in 2005. He fell about 27 feet and hit his head. [place] [facility] [place] for 4 day(s). He reports having 2 tubes in his skull. He has seen a nuerologist since but does not feel like he is 100%. He reports symptom(s) of memory difficulty and dizziness. I did refer the patient to neurology at [place]

## 2019-02-05 NOTE — NURSING NOTE
"Chief Complaint   Patient presents with     Physical     Health Maintenance       Initial /89   Pulse 72   Temp 96.8  F (36  C) (Oral)   Resp 20   Ht 1.772 m (5' 9.75\")   Wt 115.7 kg (255 lb)   SpO2 96%   BMI 36.85 kg/m   Estimated body mass index is 36.85 kg/m  as calculated from the following:    Height as of this encounter: 1.772 m (5' 9.75\").    Weight as of this encounter: 115.7 kg (255 lb).  Medication Reconciliation: complete  Cyndie Ang, MAIRA  "

## 2019-02-07 ENCOUNTER — ANCILLARY PROCEDURE (OUTPATIENT)
Dept: CT IMAGING | Facility: CLINIC | Age: 54
End: 2019-02-07
Attending: FAMILY MEDICINE
Payer: COMMERCIAL

## 2019-02-07 DIAGNOSIS — R10.13 ABDOMINAL PAIN, EPIGASTRIC: ICD-10-CM

## 2019-02-07 DIAGNOSIS — R91.1 LUNG NODULE SEEN ON IMAGING STUDY: ICD-10-CM

## 2019-02-07 DIAGNOSIS — I10 ESSENTIAL HYPERTENSION WITH GOAL BLOOD PRESSURE LESS THAN 140/90: ICD-10-CM

## 2019-02-07 DIAGNOSIS — R60.0 PEDAL EDEMA: ICD-10-CM

## 2019-02-07 PROCEDURE — 71250 CT THORAX DX C-: CPT | Mod: TC

## 2019-02-07 NOTE — LETTER
February 8, 2019    Loy Worthington  [id number] [address] [facility] [id number]          JEANMARIE Granado have reviewed the report of the imaging test or tests that we recently ordered. The results were normal or considered normal for you. The nodule in your lung has not grown at all in the last year.    Sincerely,   Hussein Sanches MD/daniel    Results for orders placed or performed in visit on 02/07/19   CT Chest w/o Contrast    Narrative    CT CHEST WITHOUT CONTRAST   2/7/2019 4:02 PM     HISTORY: Lung nodule, new nodule at annual or follow up LDCT,  suspected infection/inflammation. Lung nodule seen on imaging study.    TECHNIQUE: No IV contrast material. Radiation dose for this scan was  reduced using automated exposure control, adjustment of the mA and/or  kV according to patient size, or iterative reconstruction technique.    COMPARISON: CT of the abdomen and pelvis dated 1/25/2016    FINDINGS: There is a 6.8 mm nodule in the right lower lobe on image 39  series 3. This is unchanged in size. There is a 3 mm calcified  granuloma in the left upper lobe and a 3 mm calcified granuloma in the  right middle lobe.    Visualized soft tissues in the thorax are otherwise unremarkable.    The patient is status post a cholecystectomy. Again identified is a  hyperdense cyst at the midpole of the right kidney. Previously seen  large cyst at the upper pole of the right kidney has involuted.      Impression    IMPRESSION: Stable 6.8 mm lung nodule in the right lower lobe.    JADEN FELICIANO MD

## 2019-02-08 RX ORDER — LISINOPRIL 40 MG/1
TABLET ORAL
Qty: 90 TABLET | Refills: 3 | Status: SHIPPED | OUTPATIENT
Start: 2019-02-08 | End: 2019-02-20

## 2019-02-08 RX ORDER — OMEPRAZOLE 40 MG/1
CAPSULE, DELAYED RELEASE ORAL
Qty: 90 CAPSULE | Refills: 3 | Status: SHIPPED | OUTPATIENT
Start: 2019-02-08 | End: 2019-12-30

## 2019-02-08 RX ORDER — METOPROLOL SUCCINATE 100 MG/1
TABLET, EXTENDED RELEASE ORAL
Qty: 90 TABLET | Refills: 3 | Status: SHIPPED | OUTPATIENT
Start: 2019-02-08 | End: 2019-02-20

## 2019-02-08 RX ORDER — DOXAZOSIN 4 MG/1
TABLET ORAL
Qty: 90 TABLET | Refills: 3 | Status: SHIPPED | OUTPATIENT
Start: 2019-02-08 | End: 2019-02-20

## 2019-02-08 RX ORDER — FUROSEMIDE 40 MG
TABLET ORAL
Qty: 90 TABLET | Refills: 3 | Status: SHIPPED | OUTPATIENT
Start: 2019-02-08 | End: 2019-02-20

## 2019-02-08 NOTE — TELEPHONE ENCOUNTER
Refill request received within 30 days of last office visit with pcp.  Prescription is routed to the provider to please address refill.    Laura DILLNON, RN

## 2019-02-08 NOTE — RESULT ENCOUNTER NOTE
Loy,  I have reviewed the report of the imaging test or tests that we recently ordered. The results were normal or considered normal for you. The nodule in your lung has not grown at all in the last year.  Sincerely,   Hussein Sanches

## 2019-02-13 ENCOUNTER — PRE VISIT (OUTPATIENT)
Dept: NEUROLOGY | Facility: CLINIC | Age: 54
End: 2019-02-13

## 2019-02-13 NOTE — TELEPHONE ENCOUNTER
PREVISIT INFORMATION                                                    Loy Worthington scheduled for future visit at [facility] specialty clinics.    Patient is scheduled to see Dr. Davis on 2/19  Reason for visit: dizziness/hx brain injury with loss of consciousness  Referring provider PCP Myron  Has patient seen previous specialist? No  Medical Records:  In EPIC    REVIEW                                                      New patient packet mailed to patient: Yes  Medication reconciliation complete: No      Current Outpatient Medications   Medication Sig Dispense Refill     Cholecalciferol (VITAMIN D3) 2000 units CAPS TAKE 1 CAPSULE BY MOUTH EVERY DAY 90 capsule 0     doxazosin (CARDURA) 4 MG tablet TAKE 1 TABLET(4 MG) BY MOUTH AT BEDTIME 90 tablet 3     furosemide (LASIX) 40 MG tablet TAKE 1 TABLET(40 MG) BY MOUTH EVERY MORNING 90 tablet 3     lisinopril (PRINIVIL/ZESTRIL) 40 MG tablet TAKE 1 TABLET(40 MG) BY MOUTH DAILY 90 tablet 3     metoprolol succinate ER (TOPROL-XL) 100 MG 24 hr tablet TAKE 1 TABLET(100 MG) BY MOUTH DAILY 90 tablet 3     omeprazole (PRILOSEC) 40 MG DR capsule TAKE 1 CAPSULE(40 MG) BY MOUTH DAILY 30 TO 60 MINUTES BEFORE A MEAL 90 capsule 3     rivaroxaban ANTICOAGULANT (XARELTO) 20 MG TABS tablet Take 1 tablet (20 mg) by mouth daily (with dinner) 90 tablet 1     SUMAtriptan (IMITREX) 50 MG tablet TAKE 1 TABLET BY MOUTH AT ONSET OF HEADACHE AND MAY REPEAT IN 2 HOURS IF NEEDED, MAX 2 TABLETS DAILY 27 tablet 3       Allergies: Zocor [simvastatin - high dose]        PLAN/FOLLOW-UP NEEDED                                                      The following is needed before upcoming appointment. Left message via [employer]  reminding patient to check in 15 minutes early for his appt with Dr. Davis.     Patient Reminders Given:  Please, make sure you bring an updated list of your medications.   If you are having a procedure, please, present 15 minutes early.  If you  need to cancel or reschedule,please call [phone].    Charlene Wiley

## 2019-02-19 ENCOUNTER — OFFICE VISIT (OUTPATIENT)
Dept: NEUROLOGY | Facility: CLINIC | Age: 54
End: 2019-02-19
Attending: FAMILY MEDICINE
Payer: COMMERCIAL

## 2019-02-19 VITALS
WEIGHT: 255.9 LBS | BODY MASS INDEX: 36.63 KG/M2 | OXYGEN SATURATION: 97 % | HEART RATE: 79 BPM | SYSTOLIC BLOOD PRESSURE: 155 MMHG | HEIGHT: 70 IN | DIASTOLIC BLOOD PRESSURE: 92 MMHG

## 2019-02-19 DIAGNOSIS — R26.89 BALANCE DISORDER: Primary | ICD-10-CM

## 2019-02-19 DIAGNOSIS — F07.81 POST CONCUSSION SYNDROME: ICD-10-CM

## 2019-02-19 LAB
TSH SERPL DL<=0.005 MIU/L-ACNC: 1.6 MU/L (ref 0.4–4)
VIT B12 SERPL-MCNC: 485 PG/ML (ref 193–986)

## 2019-02-19 PROCEDURE — 83921 ORGANIC ACID SINGLE QUANT: CPT | Mod: 90 | Performed by: PSYCHIATRY & NEUROLOGY

## 2019-02-19 PROCEDURE — 99204 OFFICE O/P NEW MOD 45 MIN: CPT | Performed by: PSYCHIATRY & NEUROLOGY

## 2019-02-19 PROCEDURE — 82607 VITAMIN B-12: CPT | Performed by: PSYCHIATRY & NEUROLOGY

## 2019-02-19 PROCEDURE — 99000 SPECIMEN HANDLING OFFICE-LAB: CPT | Performed by: PSYCHIATRY & NEUROLOGY

## 2019-02-19 PROCEDURE — 84443 ASSAY THYROID STIM HORMONE: CPT | Performed by: PSYCHIATRY & NEUROLOGY

## 2019-02-19 PROCEDURE — 36415 COLL VENOUS BLD VENIPUNCTURE: CPT | Performed by: PSYCHIATRY & NEUROLOGY

## 2019-02-19 ASSESSMENT — MIFFLIN-ST. JEOR: SCORE: 2002

## 2019-02-19 ASSESSMENT — PAIN SCALES - GENERAL: PAINLEVEL: NO PAIN (0)

## 2019-02-19 NOTE — NURSING NOTE
"Loy Worthington's goals for this visit include: consult  He requests these members of his care team be copied on today's visit information:     PCP: Hussein Sanches    Referring Provider:  Hussein Sanches MD  [address] [facility] [place] [address]    BP (!) 155/92   Pulse 79   Ht 1.778 m (5' 10\")   Wt 116.1 kg (255 lb 14.4 oz)   SpO2 97%   BMI 36.72 kg/m      Do you need any medication refills at today's visit? n  "

## 2019-02-19 NOTE — LETTER
"    2/19/2019         RE: Loy Worthington  [address] [place] [address]        Dear Colleague,    Thank you for referring your patient, Loy Worthington, to the [facility]. Please see a copy of my visit note below.    Visit Date:   02/19/2019      NEUROLOGIC CONSULTATION   HISTORY OF PRESENT ILLNESS:  The patient is a 54-year-old right-handed man seen for evaluation of closed head injury.  He is here with the , Adriana.  The patient has a complex history.  He suffered a severe closed head injury in the year 2005.  He was a  at that time.  He fell about 30 feet off of a truss when it collapsed.  He landed on the back of his head.  He was hospitalized at [facility].  He was in coma for 4 days.  He lost hearing in the right ear.  He had a CSF leak.  He had a ventriculostomy placed.  He had a complex hospitalization and then was transferred to rehabilitation.  He had a long course of rehabilitation.  He was out of work for a year and a half, but now is back to work as a .  He says that he basically got back to normal.  However, when he feels weather changes he gets a \"drunk\" feeling with his balance.  He may get a headache.  He had severe headaches after the accident but they have slowly improved, but they seem to be present when he has these sensations of imbalance.  He feels like his vision is weak and needs new glasses.  As noted above, he lost hearing in the right ear.  He does have tinnitus in the right ear.  He has no problem with speech or swallowing.  He does feel somewhat weak in his arms but no symptoms in his neck.  He does not have problems with his legs other than pain in the lateral aspect of the right knee.  He has no problem with bowel or bladder control.      PAST MEDICAL HISTORY:  Significant for diabetes.  He did have a cardiac arrhythmia that was repaired via Vascular angiogram.  He does not have diabetes.  He does not have " thyroid or asthma.  He has had head trauma as noted above.  He did have a ventriculostomy placed as noted.  He does not drink or smoke.      SOCIAL HISTORY:  He is  with 7 children.      FAMILY HISTORY:  Noncontributory.      PHYSICAL EXAMINATION:   GENERAL:  The patient is cooperative and in no distress.   VITAL SIGNS:  His blood pressure is 155/92.   NECK:  There are no carotid bruits.   HEART:  Auscultation of the heart shows S1 and S2.     NEUROLOGIC EXAM: The patient is alert, oriented and lucid. Cranial nerve testing shows full visual fields to confrontation.  Funduscopic exam shows sharp discs bilaterally.  Visual acuity 20/20 bilaterally.  Eye movements are complete and conjugate without nystagmus.  Pupils react sluggishly to light.  Facies are symmetric.  Facial sensation is normal.  Palate elevates in the midline.  Tongue protrudes in the midline.   Motor evaluation shows no pronator drift, although there is incomplete supination bilaterally.  Finger tapping, finger-nose-finger and heel-knee-shin are normal.  Strength is well preserved in the arms, hands and legs.  Good muscle stretch reflexes are low amplitude and symmetric.  Toes are downgoing.   SENSORY:  Exam shows preserved vibration and temperature in the hands and preserved vibration in the toes.   GAIT, STATION AND COORDINATION:  Romberg sign is absent.  He can walk on his heels, toes and tandem.      ASSESSMENT:   1.  Intermittent problems with balance and disequilibrium.   2.  History of severe closed head injury with excellent recovery.      DISCUSSION:  The patient is seen with the above problem list.  At this point, I have discussed with the patient how to proceed.  I am going to obtain an MRI scan of the brain to make sure there is no structural lesion that could be contributing to his symptom.  I am also going to check a TSH and a vitamin B12 level.  I am not prescribing any medicine.  I will see him in followup in the next few weeks  for reexamination and to determine if any medication would be appropriate.         JOSE ANTONIO DAVIS MD             D: 2019   T: 2019   MT: AS      Name:     LARA DILLON   MRN:      [id number]        Account:      [id number]   :      1965           Visit Date:   2019      Document: [id number]           addendum: reviewed normal B12 and TSH with pt.     Again, thank you for allowing me to participate in the care of your patient.        Sincerely,        Jose Antonio Davis MD

## 2019-02-20 ENCOUNTER — OFFICE VISIT (OUTPATIENT)
Dept: FAMILY MEDICINE | Facility: CLINIC | Age: 54
End: 2019-02-20
Payer: COMMERCIAL

## 2019-02-20 VITALS
SYSTOLIC BLOOD PRESSURE: 136 MMHG | OXYGEN SATURATION: 96 % | WEIGHT: 249 LBS | HEART RATE: 71 BPM | BODY MASS INDEX: 35.73 KG/M2 | DIASTOLIC BLOOD PRESSURE: 85 MMHG | RESPIRATION RATE: 20 BRPM | TEMPERATURE: 98.8 F

## 2019-02-20 DIAGNOSIS — I10 ESSENTIAL HYPERTENSION WITH GOAL BLOOD PRESSURE LESS THAN 140/90: ICD-10-CM

## 2019-02-20 DIAGNOSIS — R10.13 ABDOMINAL PAIN, EPIGASTRIC: ICD-10-CM

## 2019-02-20 DIAGNOSIS — R60.0 PEDAL EDEMA: ICD-10-CM

## 2019-02-20 PROCEDURE — 99213 OFFICE O/P EST LOW 20 MIN: CPT | Performed by: FAMILY MEDICINE

## 2019-02-20 RX ORDER — DOXAZOSIN 4 MG/1
TABLET ORAL
Qty: 90 TABLET | Refills: 3 | Status: SHIPPED | OUTPATIENT
Start: 2019-02-20 | End: 2019-07-03

## 2019-02-20 RX ORDER — METOPROLOL SUCCINATE 100 MG/1
100 TABLET, EXTENDED RELEASE ORAL DAILY
Qty: 90 TABLET | Refills: 3 | Status: SHIPPED | OUTPATIENT
Start: 2019-02-20 | End: 2019-06-07 | Stop reason: ALTCHOICE

## 2019-02-20 RX ORDER — LISINOPRIL 40 MG/1
40 TABLET ORAL DAILY
Qty: 90 TABLET | Refills: 3 | Status: SHIPPED | OUTPATIENT
Start: 2019-02-20 | End: 2019-06-07

## 2019-02-20 RX ORDER — FUROSEMIDE 40 MG
TABLET ORAL
Qty: 90 TABLET | Refills: 3 | Status: SHIPPED | OUTPATIENT
Start: 2019-02-20 | End: 2020-06-22

## 2019-02-20 ASSESSMENT — PAIN SCALES - GENERAL: PAINLEVEL: NO PAIN (0)

## 2019-02-20 NOTE — PROGRESS NOTES
SUBJECTIVE: Loy Worthington is 54 year old male who is complains of skin lesions on the ball of his right foot  .   The lesion(s) has(have) been present for 1 year(s).  It has been painful in the past       OBJECTIVE:  /85   Pulse 71   Temp 98.8  F (37.1  C) (Oral)   Resp 20   Wt 112.9 kg (249 lb)   SpO2 96%   BMI 35.73 kg/m      On the lateral aspect of the foot in line of the metatarsal heads there was a firm 3 mm plug of tissue. It had the normal skin lines. It was minimally tender.       ASSESSMENT: Corn/callus    PLAN:   The lesion(s) was/were pared down with a 15 scalpel blade until it appeared to be gone.         An educational reference regarding this subject was printed from [employer] in [place] and given to the patient.      BP Readings from Last 6 Encounters:   02/20/19 (!) 159/98   02/19/19 (!) 155/92   02/05/19 132/80   06/07/18 129/74   01/30/18 (!) 145/94   06/13/17 125/83     Hypertension medication refilled

## 2019-02-20 NOTE — PROGRESS NOTES
"Visit Date:   02/19/2019      NEUROLOGIC CONSULTATION   HISTORY OF PRESENT ILLNESS:  The patient is a 54-year-old right-handed man seen for evaluation of closed head injury.  He is here with the , Adriana.  The patient has a complex history.  He suffered a severe closed head injury in the year 2005.  He was a  at that time.  He fell about 30 feet off of a truss when it collapsed.  He landed on the back of his head.  He was hospitalized at [facility].  He was in coma for 4 days.  He lost hearing in the right ear.  He had a CSF leak.  He had a ventriculostomy placed.  He had a complex hospitalization and then was transferred to rehabilitation.  He had a long course of rehabilitation.  He was out of work for a year and a half, but now is back to work as a .  He says that he basically got back to normal.  However, when he feels weather changes he gets a \"drunk\" feeling with his balance.  He may get a headache.  He had severe headaches after the accident but they have slowly improved, but they seem to be present when he has these sensations of imbalance.  He feels like his vision is weak and needs new glasses.  As noted above, he lost hearing in the right ear.  He does have tinnitus in the right ear.  He has no problem with speech or swallowing.  He does feel somewhat weak in his arms but no symptoms in his neck.  He does not have problems with his legs other than pain in the lateral aspect of the right knee.  He has no problem with bowel or bladder control.      PAST MEDICAL HISTORY:  Significant for diabetes.  He did have a cardiac arrhythmia that was repaired via Vascular angiogram.  He does not have diabetes.  He does not have thyroid or asthma.  He has had head trauma as noted above.  He did have a ventriculostomy placed as noted.  He does not drink or smoke.      SOCIAL HISTORY:  He is  with 7 children.      FAMILY HISTORY:  Noncontributory.      PHYSICAL " EXAMINATION:   GENERAL:  The patient is cooperative and in no distress.   VITAL SIGNS:  His blood pressure is 155/92.   NECK:  There are no carotid bruits.   HEART:  Auscultation of the heart shows S1 and S2.     NEUROLOGIC EXAM: The patient is alert, oriented and lucid. Cranial nerve testing shows full visual fields to confrontation.  Funduscopic exam shows sharp discs bilaterally.  Visual acuity 20/20 bilaterally.  Eye movements are complete and conjugate without nystagmus.  Pupils react sluggishly to light.  Facies are symmetric.  Facial sensation is normal.  Palate elevates in the midline.  Tongue protrudes in the midline.   Motor evaluation shows no pronator drift, although there is incomplete supination bilaterally.  Finger tapping, finger-nose-finger and heel-knee-shin are normal.  Strength is well preserved in the arms, hands and legs.  Good muscle stretch reflexes are low amplitude and symmetric.  Toes are downgoing.   SENSORY:  Exam shows preserved vibration and temperature in the hands and preserved vibration in the toes.   GAIT, STATION AND COORDINATION:  Romberg sign is absent.  He can walk on his heels, toes and tandem.      ASSESSMENT:   1.  Intermittent problems with balance and disequilibrium.   2.  History of severe closed head injury with excellent recovery.      DISCUSSION:  The patient is seen with the above problem list.  At this point, I have discussed with the patient how to proceed.  I am going to obtain an MRI scan of the brain to make sure there is no structural lesion that could be contributing to his symptom.  I am also going to check a TSH and a vitamin B12 level.  I am not prescribing any medicine.  I will see him in followup in the next few weeks for reexamination and to determine if any medication would be appropriate.         KHALIF BARRERA MD             D: 02/19/2019   T: 02/19/2019   MT: AS      Name:     LARA DILLON   MRN:      [id number]        Account:      [id number]    :      1965           Visit Date:   2019      Document: [id number]           addendum: reviewed normal B12 and TSH with pt.          addendum: reviewed with pt that MRI shows no recent problems.  Will discuss at f/u when  present.

## 2019-02-20 NOTE — NURSING NOTE
"Chief Complaint   Patient presents with     Wart     right foot     Health Maintenance       Initial BP (!) 159/98   Pulse 71   Temp 98.8  F (37.1  C) (Oral)   Resp 20   Wt 112.9 kg (249 lb)   SpO2 96%   BMI 35.73 kg/m   Estimated body mass index is 35.73 kg/m  as calculated from the following:    Height as of 2/19/19: 1.778 m (5' 10\").    Weight as of this encounter: 112.9 kg (249 lb).  Medication Reconciliation: complete  Cyndie Ang, MAIRA  "

## 2019-02-20 NOTE — PATIENT INSTRUCTIONS
Patient Education     What Are Corns and Calluses?    Corns and calluses are your body s response to friction or pressure against the skin. If your foot rubs the inside of your shoe, the affected area of skin thickens. Or, if a bone is not in the normal position, skin caught between bone and shoe or bone and ground builds up. In either case, the outer layer of skin thickens to protect the foot from unusual pressure. In many cases, corns and calluses look bad but are not harmful. However, more severe corns and calluses may hurt, become infected, destroy healthy tissue, or affect foot movement.    Corns  Corns usually grow on top of the foot, often at the toe joint. Corns can range from a slight thickening of skin to a painful soft or hard bump. They often form on top of buckled toe joints (hammer toes). If your toes curl under, corns may grow on the tips of the toes. You may also get a corn on the end of a toe if it rubs against your shoe. Corns can also grow between toes, often between the first and second toes.    Calluses  Calluses grow on the bottom of the foot or on the outer edge of a toe or heel. A callus may spread across the ball of your foot. This type of callus is usually due to a problem with a metatarsal (the long bone at the base of a toe, near the ball of the foot). A pinch callus may grow along the outer edge of the heel or the big toe. Some calluses press up into the foot instead of spreading on the outside. A callus may form a central core or plug of tissue where pressure is greatest.  Date Last Reviewed: 1/1/2018 2000-2018 [employer]. [address] [place] [address], [place] [address]. All rights reserved. This information is not intended as a substitute for professional medical care. Always follow your healthcare professional's instructions.           You can use a pumice stone or a Pediatric Egg to scrape down the tissue if it builds up again

## 2019-02-22 LAB — METHYLMALONATE SERPL-SCNC: 0.18 UMOL/L (ref 0–0.4)

## 2019-02-25 ENCOUNTER — ANCILLARY PROCEDURE (OUTPATIENT)
Dept: MRI IMAGING | Facility: CLINIC | Age: 54
End: 2019-02-25
Attending: PSYCHIATRY & NEUROLOGY
Payer: COMMERCIAL

## 2019-02-25 ENCOUNTER — ANCILLARY PROCEDURE (OUTPATIENT)
Dept: GENERAL RADIOLOGY | Facility: CLINIC | Age: 54
End: 2019-02-25
Attending: PSYCHIATRY & NEUROLOGY
Payer: COMMERCIAL

## 2019-02-25 DIAGNOSIS — Z98.890 H/O METAL REMOVED FROM EYE: ICD-10-CM

## 2019-02-25 DIAGNOSIS — F07.81 POST CONCUSSION SYNDROME: ICD-10-CM

## 2019-02-25 PROCEDURE — 70551 MRI BRAIN STEM W/O DYE: CPT | Performed by: RADIOLOGY

## 2019-02-25 PROCEDURE — 70200 X-RAY EXAM OF EYE SOCKETS: CPT | Performed by: RADIOLOGY

## 2019-02-27 ENCOUNTER — OFFICE VISIT (OUTPATIENT)
Dept: OPTOMETRY | Facility: CLINIC | Age: 54
End: 2019-02-27
Payer: COMMERCIAL

## 2019-02-27 DIAGNOSIS — H25.041 POSTERIOR SUBCAPSULAR POLAR SENILE CATARACT OF RIGHT EYE: ICD-10-CM

## 2019-02-27 DIAGNOSIS — H04.123 DRY EYES: ICD-10-CM

## 2019-02-27 DIAGNOSIS — H52.4 PRESBYOPIA: Primary | ICD-10-CM

## 2019-02-27 PROCEDURE — 92015 DETERMINE REFRACTIVE STATE: CPT | Performed by: OPTOMETRIST

## 2019-02-27 PROCEDURE — 92014 COMPRE OPH EXAM EST PT 1/>: CPT | Performed by: OPTOMETRIST

## 2019-02-27 ASSESSMENT — VISUAL ACUITY
OS_SC: 20/20
OS_SC: 20/100
OD_SC: 20/100
OD_SC: 20/20
METHOD: SNELLEN - LINEAR

## 2019-02-27 ASSESSMENT — REFRACTION_MANIFEST
OS_AXIS: 026
OD_CYLINDER: +0.50
OS_ADD: +2.25
OD_ADD: +2.25
OS_CYLINDER: SPHERE
OD_SPHERE: -0.50
OS_CYLINDER: +0.50
OD_SPHERE: PLANO
METHOD_AUTOREFRACTION: 1
OS_SPHERE: PLANO
OD_CYLINDER: SPHERE
OD_AXIS: 098
OS_SPHERE: 0.00

## 2019-02-27 ASSESSMENT — CONF VISUAL FIELD
METHOD: COUNTING FINGERS
OD_NORMAL: 1
OS_NORMAL: 1

## 2019-02-27 ASSESSMENT — KERATOMETRY
OS_AXISANGLE2_DEGREES: 13
OS_K2POWER_DIOPTERS: 42.75
OD_K1POWER_DIOPTERS: 45.25
OD_K2POWER_DIOPTERS: 44.25
OS_K1POWER_DIOPTERS: 43.50
OD_AXISANGLE2_DEGREES: 9

## 2019-02-27 ASSESSMENT — TONOMETRY
OS_IOP_MMHG: 10
IOP_METHOD: APPLANATION
OD_IOP_MMHG: 10

## 2019-02-27 ASSESSMENT — CUP TO DISC RATIO
OS_RATIO: 0.4
OD_RATIO: 0.4

## 2019-02-27 ASSESSMENT — EXTERNAL EXAM - LEFT EYE: OS_EXAM: NORMAL

## 2019-02-27 ASSESSMENT — EXTERNAL EXAM - RIGHT EYE: OD_EXAM: NORMAL

## 2019-02-27 ASSESSMENT — SLIT LAMP EXAM - LIDS
COMMENTS: NORMAL
COMMENTS: NORMAL

## 2019-02-27 NOTE — PATIENT INSTRUCTIONS
Patient was advised of today's exam findings.  Reading glasses advised +1.75 to +2.25  Use artificial tears like Systane Ultra or Blink Tears when eyes feel dry   Return in 1 year for eye exam    Maddie Buck O.D.  [facility]   [address] [place], [place] [id number] [phone]

## 2019-02-27 NOTE — PROGRESS NOTES
Chief Complaint   Patient presents with     Annual Eye Exam      Accompanied by , Adriana  Last Eye Exam: 2/6/2018  Dilated Previously: Yes    What are you currently using to see?  Readers, Patient said that he uses +1.75 readers, he did not bring them to this appointment        Distance Vision Acuity: Satisfied with vision, no changes or problems     Near Vision Acuity: Satisfied with vision while reading and using computer with readers, and feels like he may need to up the power of the readers     Eye Comfort: dry and irritated   Do you use eye drops? : No, said that he used to but hasn't used them for a long time   Occupation or Hobbies: [employer]           Medical, surgical and family histories reviewed and updated 2/27/2019.       OBJECTIVE: See Ophthalmology exam    ASSESSMENT:    ICD-10-CM    1. Presbyopia H52.4    2. Dry eyes H04.123    3. 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  Reading glasses advised +1.75 to +2.25  Use artificial tears like Systane Ultra or Blink Tears when eyes feel dry   Return in 1 year for eye exam    Maddie Buck O.D.  [facility]   [address] [place] [address] [phone]

## 2019-03-11 DIAGNOSIS — G43.809 OTHER MIGRAINE WITHOUT STATUS MIGRAINOSUS, NOT INTRACTABLE: Primary | ICD-10-CM

## 2019-03-11 DIAGNOSIS — E55.9 VITAMIN D DEFICIENCY DISEASE: ICD-10-CM

## 2019-03-12 RX ORDER — ACETAMINOPHEN 160 MG
TABLET,DISINTEGRATING ORAL
Qty: 90 CAPSULE | Refills: 0 | Status: SHIPPED | OUTPATIENT
Start: 2019-03-12 | End: 2019-07-03

## 2019-03-12 RX ORDER — SUMATRIPTAN 50 MG/1
TABLET, FILM COATED ORAL
Qty: 27 TABLET | Refills: 0 | Status: SHIPPED | OUTPATIENT
Start: 2019-03-12 | End: 2019-05-20

## 2019-03-12 NOTE — TELEPHONE ENCOUNTER
Refill request received within 30 days of last office visit with pcp.  Prescription is routed to the provider to please address refill.   Dalia Guevara RN

## 2019-03-13 ENCOUNTER — OFFICE VISIT (OUTPATIENT)
Dept: NEUROLOGY | Facility: CLINIC | Age: 54
End: 2019-03-13
Payer: COMMERCIAL

## 2019-03-13 VITALS
WEIGHT: 253 LBS | DIASTOLIC BLOOD PRESSURE: 93 MMHG | BODY MASS INDEX: 36.3 KG/M2 | OXYGEN SATURATION: 96 % | HEART RATE: 77 BPM | SYSTOLIC BLOOD PRESSURE: 144 MMHG

## 2019-03-13 DIAGNOSIS — F07.81 POST CONCUSSION SYNDROME: Primary | ICD-10-CM

## 2019-03-13 PROCEDURE — 99213 OFFICE O/P EST LOW 20 MIN: CPT | Performed by: PSYCHIATRY & NEUROLOGY

## 2019-03-13 ASSESSMENT — PAIN SCALES - GENERAL: PAINLEVEL: NO PAIN (0)

## 2019-03-13 NOTE — LETTER
3/13/2019         RE: Loy Worthington  [address] [place] [address]        Dear Colleague,    Thank you for referring your patient, Loy Worthington, to the [facility]. Please see a copy of my visit note below.    Visit Date:   03/13/2019      HISTORY OF PRESENT ILLNESS:  This patient is seen in followup with a history of closed head injury.  The , Adriana, is present.  She was also present at the time of his initial visit.  The patient did have an MRI scan of the brain performed.  I reviewed the images with him.  There is an area of T2 hyperintensity in the right temporal lobe which is sequela from the prior injury of 2005.  There is also evidence of a ventriculostomy tract in the right frontal lobe.  There is inflammatory pansinusitis, but otherwise no abnormality identified.  He had lab work performed because of symptoms of intermittent imbalance.  A vitamin B12 level was 480, and a TSH was normal.      The patient has no new complaints or concerns.  When I initially saw him, one of his questions was whether the symptoms of intermittent imbalance had anything to do with his prior head injury or other etiology.      PHYSICAL EXAMINATION: On exam, the patient is cooperative and in no distress.   VITAL SIGNS:  His blood pressure is 144/93.  There is a need to add on neurologic exam.      ASSESSMENT:   1.  History of closed head injury.   2.  Symptoms of intermittent imbalance and dysequilibrium.      DISCUSSION:  The patient is seen with the above problem list.  He is doing quite well.  His neurologic exam was normal.  When I initially saw him.  I have nothing to add at this time.  I reassured the patient on his excellent recovery from his severe closed head injury.  I propose no further workup or investigations at this time.  Followup will be on an as needed basis.  He is in agreement with the plan.         KHALIF BARRERA MD             D: 03/13/2019   T: 03/13/2019   MT: COLE       Name:     LARA DILLON   MRN:      [id number]        Account:      [id number]   :      1965           Visit Date:   2019      Document: [id number]       cc: Hussein Sanches MD      Again, thank you for allowing me to participate in the care of your patient.        Sincerely,        Jose Antonio Davis MD

## 2019-03-13 NOTE — NURSING NOTE
Loy Worthington's goals for this visit include:   Chief Complaint   Patient presents with     RECHECK     results      He requests these members of his care team be copied on today's visit information: pcp    PCP: Hussein Sanches    Referring Provider:  No referring provider defined for this encounter.    BP (!) 144/93 (BP Location: Left arm, Patient Position: Sitting, Cuff Size: Adult Large)   Pulse 77   Wt 114.8 kg (253 lb)   SpO2 96%   BMI 36.30 kg/m      Do you need any medication refills at today's visit? N

## 2019-03-13 NOTE — PROGRESS NOTES
Visit Date:   2019      HISTORY OF PRESENT ILLNESS:  This patient is seen in followup with a history of closed head injury.  The , Adriana, is present.  She was also present at the time of his initial visit.  The patient did have an MRI scan of the brain performed.  I reviewed the images with him.  There is an area of T2 hyperintensity in the right temporal lobe which is sequela from the prior injury of .  There is also evidence of a ventriculostomy tract in the right frontal lobe.  There is inflammatory pansinusitis, but otherwise no abnormality identified.  He had lab work performed because of symptoms of intermittent imbalance.  A vitamin B12 level was 480, and a TSH was normal.      The patient has no new complaints or concerns.  When I initially saw him, one of his questions was whether the symptoms of intermittent imbalance had anything to do with his prior head injury or other etiology.      PHYSICAL EXAMINATION: On exam, the patient is cooperative and in no distress.   VITAL SIGNS:  His blood pressure is 144/93.  There is a need to add on neurologic exam.      ASSESSMENT:   1.  History of closed head injury.   2.  Symptoms of intermittent imbalance and dysequilibrium.      DISCUSSION:  The patient is seen with the above problem list.  He is doing quite well.  His neurologic exam was normal.  When I initially saw him.  I have nothing to add at this time.  I reassured the patient on his excellent recovery from his severe closed head injury.  I propose no further workup or investigations at this time.  Followup will be on an as needed basis.  He is in agreement with the plan.         KHALIF BARRERA MD             D: 2019   T: 2019   MT: COLE      Name:     LARA DILLON   MRN:      [id number]        Account:      [id number]   :      1965           Visit Date:   2019      Document: [id number]       cc: Hussein Sanches MD

## 2019-04-22 ENCOUNTER — TRANSFERRED RECORDS (OUTPATIENT)
Dept: HEALTH INFORMATION MANAGEMENT | Facility: CLINIC | Age: 54
End: 2019-04-22

## 2019-05-20 DIAGNOSIS — G43.809 OTHER MIGRAINE WITHOUT STATUS MIGRAINOSUS, NOT INTRACTABLE: ICD-10-CM

## 2019-05-22 RX ORDER — SUMATRIPTAN 50 MG/1
TABLET, FILM COATED ORAL
Qty: 27 TABLET | Refills: 0 | Status: SHIPPED | OUTPATIENT
Start: 2019-05-22 | End: 2019-08-27

## 2019-06-07 ENCOUNTER — OFFICE VISIT (OUTPATIENT)
Dept: FAMILY MEDICINE | Facility: CLINIC | Age: 54
End: 2019-06-07
Payer: COMMERCIAL

## 2019-06-07 VITALS
OXYGEN SATURATION: 98 % | RESPIRATION RATE: 16 BRPM | DIASTOLIC BLOOD PRESSURE: 92 MMHG | HEART RATE: 76 BPM | TEMPERATURE: 98.5 F | SYSTOLIC BLOOD PRESSURE: 158 MMHG | WEIGHT: 258 LBS | BODY MASS INDEX: 37.02 KG/M2

## 2019-06-07 DIAGNOSIS — I10 ESSENTIAL HYPERTENSION WITH GOAL BLOOD PRESSURE LESS THAN 140/90: ICD-10-CM

## 2019-06-07 DIAGNOSIS — R10.13 ABDOMINAL PAIN, EPIGASTRIC: ICD-10-CM

## 2019-06-07 DIAGNOSIS — R60.0 PEDAL EDEMA: ICD-10-CM

## 2019-06-07 DIAGNOSIS — M79.604 PAIN IN BOTH LOWER EXTREMITIES: Primary | ICD-10-CM

## 2019-06-07 DIAGNOSIS — M79.605 PAIN IN BOTH LOWER EXTREMITIES: Primary | ICD-10-CM

## 2019-06-07 LAB
ALBUMIN SERPL-MCNC: 4.3 G/DL (ref 3.4–5)
ALP SERPL-CCNC: 84 U/L (ref 40–150)
ALT SERPL W P-5'-P-CCNC: 46 U/L (ref 0–70)
ANION GAP SERPL CALCULATED.3IONS-SCNC: 7 MMOL/L (ref 3–14)
AST SERPL W P-5'-P-CCNC: 28 U/L (ref 0–45)
BILIRUB SERPL-MCNC: 0.4 MG/DL (ref 0.2–1.3)
BUN SERPL-MCNC: 18 MG/DL (ref 7–30)
CALCIUM SERPL-MCNC: 8.8 MG/DL (ref 8.5–10.1)
CHLORIDE SERPL-SCNC: 110 MMOL/L (ref 94–109)
CK SERPL-CCNC: 353 U/L (ref 30–300)
CO2 SERPL-SCNC: 29 MMOL/L (ref 20–32)
CREAT SERPL-MCNC: 0.86 MG/DL (ref 0.66–1.25)
GFR SERPL CREATININE-BSD FRML MDRD: >90 ML/MIN/{1.73_M2}
GLUCOSE SERPL-MCNC: 89 MG/DL (ref 70–99)
MAGNESIUM SERPL-MCNC: 2.6 MG/DL (ref 1.6–2.3)
POTASSIUM SERPL-SCNC: 4.2 MMOL/L (ref 3.4–5.3)
PROT SERPL-MCNC: 7.9 G/DL (ref 6.8–8.8)
SODIUM SERPL-SCNC: 146 MMOL/L (ref 133–144)

## 2019-06-07 PROCEDURE — 36415 COLL VENOUS BLD VENIPUNCTURE: CPT | Performed by: FAMILY MEDICINE

## 2019-06-07 PROCEDURE — 99214 OFFICE O/P EST MOD 30 MIN: CPT | Performed by: FAMILY MEDICINE

## 2019-06-07 PROCEDURE — 83735 ASSAY OF MAGNESIUM: CPT | Performed by: FAMILY MEDICINE

## 2019-06-07 PROCEDURE — 82550 ASSAY OF CK (CPK): CPT | Performed by: FAMILY MEDICINE

## 2019-06-07 PROCEDURE — 80053 COMPREHEN METABOLIC PANEL: CPT | Performed by: FAMILY MEDICINE

## 2019-06-07 RX ORDER — LISINOPRIL 40 MG/1
40 TABLET ORAL DAILY
Qty: 90 TABLET | Refills: 3 | Status: SHIPPED | OUTPATIENT
Start: 2019-06-07 | End: 2020-06-22

## 2019-06-07 RX ORDER — CARVEDILOL 25 MG/1
25 TABLET ORAL 2 TIMES DAILY WITH MEALS
Qty: 60 TABLET | Refills: 1 | Status: SHIPPED | OUTPATIENT
Start: 2019-06-07 | End: 2019-07-30

## 2019-06-07 RX ORDER — METOPROLOL SUCCINATE 100 MG/1
100 TABLET, EXTENDED RELEASE ORAL DAILY
Qty: 90 TABLET | Refills: 3 | Status: CANCELLED | OUTPATIENT
Start: 2019-06-07

## 2019-06-07 NOTE — PROGRESS NOTES
SUBJECTIVE:  Loy Worthington is an 54 year old male who presents for a follow up evaluation of his hypertension.The patient was kept on the same medications at the last visit. The patient reports that he IS NOT taking the medication as prescribed. He denies side effects of       He has been checking his blood pressure at home.   He got a new blood pressure cuff 2 weeks ago and it still read high.     He has been getting blood pressure of 171-113     He has called his brother who is a physician and he increased the dose of the metoprolol to bid from every day.  His blood pressure is a little bit lower since then.       He reports that he gets pain in the quads and calfs when he walks. Even is he walks downhill.             Patient Active Problem List   Diagnosis     Brain injury (H)     Renal mass     Migraine headache     CARDIOVASCULAR SCREENING; LDL GOAL LESS THAN 130     Hypertension goal BP (blood pressure) < 140/90     Bilateral corneal scars     High triglycerides     Familial hypoalphalipoproteinemia     Vitamin D deficiency     Hyperlipidemia LDL goal <130     Vitamin D deficiency disease     GERD (gastroesophageal reflux disease)     Tubular adenoma of colon     Paroxysmal atrial fibrillation (H)     Hematospermia     Lung nodule, solitary, Needs chest CT in Jan 2017     S/P laparoscopic cholecystectomy     Pulmonary nodule, right lung base needs fu scan     HDL deficiency     Diverticulosis of large intestine     Obesity (BMI 35.0-39.9) with comorbidity (H)     Post concussion syndrome       Is the HYPERTENSION goal on the problem list? Yes      Use of agents associated with hypertension: none  Current Outpatient Medications   Medication     Cholecalciferol (VITAMIN D3) 2000 units CAPS     doxazosin (CARDURA) 4 MG tablet     furosemide (LASIX) 40 MG tablet     lisinopril (PRINIVIL/ZESTRIL) 40 MG tablet     metoprolol succinate ER (TOPROL-XL) 100 MG 24 hr tablet     omeprazole (PRILOSEC) 40 MG DR capsule      rivaroxaban ANTICOAGULANT (XARELTO) 20 MG TABS tablet     SUMAtriptan (IMITREX) 50 MG tablet     No current facility-administered medications for this visit.          Allergies   Allergen Reactions     Zocor [Simvastatin - High Dose] Swelling     Redness,itching,swelling         Social History     Tobacco Use     Smoking status: Never Smoker     Smokeless tobacco: Never Used     Tobacco comment: Lives in smoke free household   Substance Use Topics     Alcohol use: No       OBJECTIVE:  BP (!) 158/92   Pulse 76   Temp 98.5  F (36.9  C) (Oral)   Resp 16   Wt 117 kg (258 lb)   SpO2 98%   BMI 37.02 kg/m      Heart: negative, PMI normal. No lifts, heaves, or thrills. RRR. No murmurs, clicks gallops or rub  Lower Extremities:1+ edema on right and 1+  edema on the left  DP pulse +2/2 and capillary refill(s) was brisk          Results for orders placed or performed in visit on 02/25/19   XR Orbits G/E 3 Views    Narrative    EXAM:  XR ORBITS G/E 4 VW    INDICATION: H/O metal removed from eye    COMPARISON:  None    FINDINGS/    Impression    IMPRESSION:  PA Tovar and lateral view of the skull demonstrate no metallic  foreign body within the orbits.    I have personally reviewed the examination and initial interpretation  and I agree with the findings.    JASMYNE QUIROZ MD       The 10-year ASCVD risk score ([place] GERRI Jr., et al., 2013) is: 10.8%    Values used to calculate the score:      Age: 54 years      Sex: Male      Is Non- : No      Diabetic: No      Tobacco smoker: No      Systolic Blood Pressure: 158 mmHg      Is BP treated: Yes      HDL Cholesterol: 34 mg/dL      Total Cholesterol: 168 mg/dL        He does not smokes.       ASSESSMENT:  Essential hypertension which is poorly controlled.   Claudication symptom(s) with a normal vascular exam of his lower extremity(ies)      Plan:  - Medication:start 25 mg of Coreg. and discontinue the metoprolol.    The patient was advised to do the  following therapuetic life style changes  - Dietary sodium restriction and increase potassium and Calcium intake  - Regular aerobic exercise  - Weight loss  - Discontinue smoking if applicable  - Avoid regular NSAID use if applicable  - Avoid regular decongestant use if applicable  - Follow up in clinic in 4 weeks for a recheck  - Check a basic metabolic panel today    Patient Education: Reviewed risks of hypertension and principles of   Treatment.   the patient was seen with an  and the patient asked me to speak with his brother who is an Internal Medicine doctor in [place]

## 2019-06-09 ENCOUNTER — TELEPHONE (OUTPATIENT)
Dept: FAMILY MEDICINE | Facility: CLINIC | Age: 54
End: 2019-06-09

## 2019-06-09 DIAGNOSIS — E87.0 HYPERNATREMIA: ICD-10-CM

## 2019-06-09 DIAGNOSIS — R74.8 ELEVATED CK: ICD-10-CM

## 2019-06-09 DIAGNOSIS — E83.41 HYPERMAGNESEMIA: Primary | ICD-10-CM

## 2019-06-10 ENCOUNTER — TELEPHONE (OUTPATIENT)
Dept: FAMILY MEDICINE | Facility: CLINIC | Age: 54
End: 2019-06-10

## 2019-06-10 NOTE — TELEPHONE ENCOUNTER
Left message on answering machine for patient to call back. Cecelia ELIZONDO, [phone].  Team RN tomorrow: [phone]

## 2019-06-10 NOTE — TELEPHONE ENCOUNTER
So it does not make any sense why his Magnesium is elevated when his kidney are normal and he is not taking a supplement. We need to do some further labs to make sense of this. Please have the patient make a laboratory appointment(s) (non-fasting) and I will put the orders in

## 2019-06-10 NOTE — TELEPHONE ENCOUNTER
Using the [place] interpretor services, I used a Congolese interpretor to give patient the below message.   Patient says he is not taking any other supplements other than the Vit D.informed patient we will call him later today with Dr. Hussein Sanches's plan.  He said he understands this.     Routing to provider to advise.  Molly DILLONN, RN

## 2019-06-10 NOTE — TELEPHONE ENCOUNTER
Please call the patient. He speaks English but he may prefer a family member to translate for him. His Magnesium is high so I need to know if he is taking any supplements at all other than the Vitamin D3 2,000 international unit(s) daily. Please send back to me.   Hussein Sanches MD

## 2019-06-11 ENCOUNTER — DOCUMENTATION ONLY (OUTPATIENT)
Dept: LAB | Facility: CLINIC | Age: 54
End: 2019-06-11

## 2019-06-11 DIAGNOSIS — E83.41 HYPERMAGNESEMIA: ICD-10-CM

## 2019-06-11 DIAGNOSIS — E87.0 HYPERNATREMIA: ICD-10-CM

## 2019-06-11 DIAGNOSIS — R74.8 ELEVATED CK: ICD-10-CM

## 2019-06-11 LAB
ALBUMIN SERPL-MCNC: 4.2 G/DL (ref 3.4–5)
ANION GAP SERPL CALCULATED.3IONS-SCNC: 8 MMOL/L (ref 3–14)
BUN SERPL-MCNC: 19 MG/DL (ref 7–30)
CALCIUM SERPL-MCNC: 8.8 MG/DL (ref 8.5–10.1)
CHLORIDE SERPL-SCNC: 107 MMOL/L (ref 94–109)
CK SERPL-CCNC: 320 U/L (ref 30–300)
CO2 SERPL-SCNC: 27 MMOL/L (ref 20–32)
CORTIS SERPL-MCNC: 4.3 UG/DL (ref 4–22)
CREAT SERPL-MCNC: 0.82 MG/DL (ref 0.66–1.25)
GFR SERPL CREATININE-BSD FRML MDRD: >90 ML/MIN/{1.73_M2}
GLUCOSE SERPL-MCNC: 99 MG/DL (ref 70–99)
LDH SERPL L TO P-CCNC: 228 U/L (ref 85–227)
MAGNESIUM SERPL-MCNC: 2.4 MG/DL (ref 1.6–2.3)
PHOSPHATE SERPL-MCNC: 3.6 MG/DL (ref 2.5–4.5)
POTASSIUM SERPL-SCNC: 3.9 MMOL/L (ref 3.4–5.3)
SODIUM SERPL-SCNC: 142 MMOL/L (ref 133–144)
TSH SERPL DL<=0.005 MIU/L-ACNC: 1.95 MU/L (ref 0.4–4)

## 2019-06-11 PROCEDURE — 80069 RENAL FUNCTION PANEL: CPT | Performed by: FAMILY MEDICINE

## 2019-06-11 PROCEDURE — 82533 TOTAL CORTISOL: CPT | Performed by: FAMILY MEDICINE

## 2019-06-11 PROCEDURE — 82550 ASSAY OF CK (CPK): CPT | Performed by: FAMILY MEDICINE

## 2019-06-11 PROCEDURE — 84443 ASSAY THYROID STIM HORMONE: CPT | Performed by: FAMILY MEDICINE

## 2019-06-11 PROCEDURE — 82088 ASSAY OF ALDOSTERONE: CPT | Performed by: FAMILY MEDICINE

## 2019-06-11 PROCEDURE — 82085 ASSAY OF ALDOLASE: CPT | Mod: 90 | Performed by: FAMILY MEDICINE

## 2019-06-11 PROCEDURE — 83615 LACTATE (LD) (LDH) ENZYME: CPT | Performed by: FAMILY MEDICINE

## 2019-06-11 PROCEDURE — 99000 SPECIMEN HANDLING OFFICE-LAB: CPT | Performed by: FAMILY MEDICINE

## 2019-06-11 PROCEDURE — 83735 ASSAY OF MAGNESIUM: CPT | Performed by: FAMILY MEDICINE

## 2019-06-11 PROCEDURE — 36415 COLL VENOUS BLD VENIPUNCTURE: CPT | Performed by: FAMILY MEDICINE

## 2019-06-11 NOTE — TELEPHONE ENCOUNTER
Using the [place] interpretor services, I used a [place] interpretor to leave a 2nd voicemail on patient's home number to call this RN back.   Left my direct number to call back with.    Molly Izquierdo BSN, RN

## 2019-06-11 NOTE — TELEPHONE ENCOUNTER
Patient called back without and interpretor.  This RN gave patient providers message and he states he understands this.   He declined to set up lab appointment now as he needs to get a hold of his transportation people first to find out when he can get in.    Molly Izquierdo BSN, RN

## 2019-06-12 LAB — ALDOLASE SERPL-CCNC: 6.3 U/L (ref 1.5–8.1)

## 2019-06-12 NOTE — PROGRESS NOTES
Patient had a lab only appointment on 6.11.2019 for Dr Sanches. Patient is requesting that his lab results be sent to him by mail when they are complete.       Thank you,   Kylie Vora MLT ([facility])

## 2019-06-12 NOTE — PROGRESS NOTES
The patient wants his lab results mailed to him.  After you review his results please route back to me in 'Result Notes' so I can mail him a letter.  Thank you.  [place],

## 2019-06-14 LAB — ALDOST SERPL-MCNC: 8.3 NG/DL (ref 0–31)

## 2019-06-25 DIAGNOSIS — E55.9 VITAMIN D DEFICIENCY DISEASE: ICD-10-CM

## 2019-06-26 RX ORDER — ACETAMINOPHEN 160 MG
TABLET,DISINTEGRATING ORAL
Qty: 90 CAPSULE | Refills: 3 | Status: SHIPPED | OUTPATIENT
Start: 2019-06-26 | End: 2020-07-06

## 2019-06-28 ENCOUNTER — TELEPHONE (OUTPATIENT)
Dept: FAMILY MEDICINE | Facility: CLINIC | Age: 54
End: 2019-06-28

## 2019-07-03 ENCOUNTER — OFFICE VISIT (OUTPATIENT)
Dept: FAMILY MEDICINE | Facility: CLINIC | Age: 54
End: 2019-07-03
Payer: COMMERCIAL

## 2019-07-03 VITALS
SYSTOLIC BLOOD PRESSURE: 139 MMHG | RESPIRATION RATE: 22 BRPM | DIASTOLIC BLOOD PRESSURE: 82 MMHG | WEIGHT: 258 LBS | TEMPERATURE: 98.4 F | HEART RATE: 72 BPM | OXYGEN SATURATION: 98 % | BODY MASS INDEX: 37.02 KG/M2

## 2019-07-03 DIAGNOSIS — G89.29 CHRONIC PAIN OF BOTH KNEES: Primary | ICD-10-CM

## 2019-07-03 DIAGNOSIS — M25.561 CHRONIC PAIN OF BOTH KNEES: Primary | ICD-10-CM

## 2019-07-03 DIAGNOSIS — R10.13 ABDOMINAL PAIN, EPIGASTRIC: ICD-10-CM

## 2019-07-03 DIAGNOSIS — R60.0 PEDAL EDEMA: ICD-10-CM

## 2019-07-03 DIAGNOSIS — I10 ESSENTIAL HYPERTENSION WITH GOAL BLOOD PRESSURE LESS THAN 140/90: ICD-10-CM

## 2019-07-03 DIAGNOSIS — M79.10 MUSCULAR PAIN: ICD-10-CM

## 2019-07-03 DIAGNOSIS — M25.562 CHRONIC PAIN OF BOTH KNEES: Primary | ICD-10-CM

## 2019-07-03 DIAGNOSIS — E55.9 VITAMIN D DEFICIENCY: ICD-10-CM

## 2019-07-03 LAB
CK SERPL-CCNC: 361 U/L (ref 30–300)
IRON SATN MFR SERPL: 20 % (ref 15–46)
IRON SERPL-MCNC: 70 UG/DL (ref 35–180)
LDH SERPL L TO P-CCNC: 241 U/L (ref 85–227)
MAGNESIUM SERPL-MCNC: 2.4 MG/DL (ref 1.6–2.3)
TIBC SERPL-MCNC: 355 UG/DL (ref 240–430)

## 2019-07-03 PROCEDURE — 82550 ASSAY OF CK (CPK): CPT | Performed by: FAMILY MEDICINE

## 2019-07-03 PROCEDURE — 99214 OFFICE O/P EST MOD 30 MIN: CPT | Performed by: FAMILY MEDICINE

## 2019-07-03 PROCEDURE — 36415 COLL VENOUS BLD VENIPUNCTURE: CPT | Performed by: FAMILY MEDICINE

## 2019-07-03 PROCEDURE — 82306 VITAMIN D 25 HYDROXY: CPT | Performed by: FAMILY MEDICINE

## 2019-07-03 PROCEDURE — 83550 IRON BINDING TEST: CPT | Performed by: FAMILY MEDICINE

## 2019-07-03 PROCEDURE — 83540 ASSAY OF IRON: CPT | Performed by: FAMILY MEDICINE

## 2019-07-03 PROCEDURE — 83735 ASSAY OF MAGNESIUM: CPT | Performed by: FAMILY MEDICINE

## 2019-07-03 PROCEDURE — 83615 LACTATE (LD) (LDH) ENZYME: CPT | Performed by: FAMILY MEDICINE

## 2019-07-03 RX ORDER — DOXAZOSIN 8 MG/1
TABLET ORAL
Qty: 90 TABLET | Refills: 1 | Status: SHIPPED | OUTPATIENT
Start: 2019-07-03 | End: 2020-01-27

## 2019-07-03 ASSESSMENT — PAIN SCALES - GENERAL: PAINLEVEL: EXTREME PAIN (8)

## 2019-07-03 NOTE — PROGRESS NOTES
SUBJECTIVE:  Loy Worthington is an 54 year old male who presents for a follow up evaluation of his hypertension.The patient was started on 25 mg of Coreg bid at the last visit. The patient reports that he IS taking the medication as prescribed. He denies side effects of medication.    He reports that his blood pressure checks at home 167/97 today       He still has pain in the thighs and calves.    It is worse on the right than left   He denies any back pain    Pain can be present at night and affect his sleep    Heat and rubbing help the area helps    He has been limping     If he sits he has to stand for a while before walking because his legs are tight and feel crampy    He gets severe cramps at night.  He denies any pain in the upper body       He denies any history of back problems.   He has occasionally discomfort    He works as a      He last took his Vitamin D 4 day(s) ago     He has been taking a magnesium supplement(al) for about 3 weeks now    Patient Active Problem List   Diagnosis     Brain injury (H)     Renal mass     Migraine headache     CARDIOVASCULAR SCREENING; LDL GOAL LESS THAN 130     Hypertension goal BP (blood pressure) < 140/90     Bilateral corneal scars     High triglycerides     Familial hypoalphalipoproteinemia     Vitamin D deficiency     Hyperlipidemia LDL goal <130     Vitamin D deficiency disease     GERD (gastroesophageal reflux disease)     Tubular adenoma of colon     Paroxysmal atrial fibrillation (H)     Hematospermia     Lung nodule, solitary, Needs chest CT in Jan 2017     S/P laparoscopic cholecystectomy     Pulmonary nodule, right lung base needs fu scan     HDL deficiency     Diverticulosis of large intestine     Obesity (BMI 35.0-39.9) with comorbidity (H)     Post concussion syndrome       Is the HYPERTENSION goal on the problem list? Yes      Use of agents associated with hypertension: none  Current Outpatient Medications   Medication     carvedilol (COREG) 25 MG  tablet     Cholecalciferol (VITAMIN D3) 2000 units CAPS     doxazosin (CARDURA) 4 MG tablet     furosemide (LASIX) 40 MG tablet     lisinopril (PRINIVIL/ZESTRIL) 40 MG tablet     omeprazole (PRILOSEC) 40 MG DR capsule     rivaroxaban ANTICOAGULANT (XARELTO) 20 MG TABS tablet     SUMAtriptan (IMITREX) 50 MG tablet     No current facility-administered medications for this visit.          Allergies   Allergen Reactions     Zocor [Simvastatin - High Dose] Swelling     Redness,itching,swelling         Social History     Tobacco Use     Smoking status: Never Smoker     Smokeless tobacco: Never Used     Tobacco comment: Lives in smoke free household   Substance Use Topics     Alcohol use: No       OBJECTIVE:  /82   Pulse 72   Temp 98.4  F (36.9  C) (Oral)   Resp 22   Wt 117 kg (258 lb)   SpO2 98%   BMI 37.02 kg/m      Heart: negative, PMI normal. No lifts, heaves, or thrills. RRR. No murmurs, clicks gallops or rub  Lower Extremities:1+ edema on right and 1+  edema on the left        Results for orders placed or performed in visit on 06/11/19   Renal panel   Result Value Ref Range    Sodium 142 133 - 144 mmol/L    Potassium 3.9 3.4 - 5.3 mmol/L    Chloride 107 94 - 109 mmol/L    Carbon Dioxide 27 20 - 32 mmol/L    Anion Gap 8 3 - 14 mmol/L    Glucose 99 70 - 99 mg/dL    Urea Nitrogen 19 7 - 30 mg/dL    Creatinine 0.82 0.66 - 1.25 mg/dL    GFR Estimate >90 >60 mL/min/[1.73_m2]    GFR Estimate If Black >90 >60 mL/min/[1.73_m2]    Calcium 8.8 8.5 - 10.1 mg/dL    Phosphorus 3.6 2.5 - 4.5 mg/dL    Albumin 4.2 3.4 - 5.0 g/dL   Cortisol   Result Value Ref Range    Cortisol Serum 4.3 4 - 22 ug/dL   Aldosterone   Result Value Ref Range    Aldosterone 8.3 0.0 - 31.0 ng/dL   TSH with free T4 reflex   Result Value Ref Range    TSH 1.95 0.40 - 4.00 mU/L   Lactate Dehydrogenase   Result Value Ref Range    Lactate Dehydrogenase 228 (H) 85 - 227 U/L   Aldolase   Result Value Ref Range    Aldolase 6.3 1.5 - 8.1 U/L   CK total    Result Value Ref Range    CK Total 320 (H) 30 - 300 U/L   Magnesium   Result Value Ref Range    Magnesium 2.4 (H) 1.6 - 2.3 mg/dL       The 10-year ASCVD risk score (Rafael TALLEY Jr., et al., 2013) is: 8.6%    Values used to calculate the score:      Age: 54 years      Sex: Male      Is Non- : No      Diabetic: No      Tobacco smoker: No      Systolic Blood Pressure: 139 mmHg      Is BP treated: Yes      HDL Cholesterol: 34 mg/dL      Total Cholesterol: 168 mg/dL    ASSESSMENT:  Essential hypertension which is marginally controlled.       Plan:  - Medication:increase the dose of doxazosin to 8 mg.    The patient was advised to do the following therapuetic life style changes  - Dietary sodium restriction and increase potassium and Calcium intake  - Regular aerobic exercise  - Weight loss  - Discontinue smoking if applicable  - Avoid regular NSAID use if applicable  - Avoid regular decongestant use if applicable  - Follow up in clinic in 4 weeks for a recheck  - Check a basic metabolic panel today    Patient Education: Reviewed risks of hypertension and principles of   Treatment.    We will recheck the labs that have previously been abnormal including his magnesium, LDH and CK.  Vitamin D level drawn also.    I spoke with his brother again at the request(ed) of the patient. His brother relayed to me a history of knee arthritis and he thought that perhaps an orthopedic(s) consult to look at his knees would be helpful. That referral was made.

## 2019-07-03 NOTE — NURSING NOTE
"Chief Complaint   Patient presents with     Hypertension     Health Maintenance       Initial /85   Pulse 72   Temp 98.4  F (36.9  C) (Oral)   Resp 22   Wt 117 kg (258 lb)   SpO2 98%   BMI 37.02 kg/m   Estimated body mass index is 37.02 kg/m  as calculated from the following:    Height as of 2/19/19: 1.778 m (5' 10\").    Weight as of this encounter: 117 kg (258 lb).  Medication Reconciliation: complete  Cyndie Ang, MAIRA  "

## 2019-07-05 ENCOUNTER — TELEPHONE (OUTPATIENT)
Dept: FAMILY MEDICINE | Facility: CLINIC | Age: 54
End: 2019-07-05

## 2019-07-05 DIAGNOSIS — M79.10 MYALGIA: Primary | ICD-10-CM

## 2019-07-05 DIAGNOSIS — I10 HYPERTENSION GOAL BP (BLOOD PRESSURE) < 140/90: ICD-10-CM

## 2019-07-05 LAB
DEPRECATED CALCIDIOL+CALCIFEROL SERPL-MC: <39 UG/L (ref 20–75)
VITAMIN D2 SERPL-MCNC: <5 UG/L
VITAMIN D3 SERPL-MCNC: 34 UG/L

## 2019-07-05 NOTE — TELEPHONE ENCOUNTER
Please call the patient and let him know the results of his laboratory tests. You will need to call his interpretor Adriana to relay these result(s) to him.  His magnesium is still high but that may be because of he is now taking a magnesium supplement. He should stop taking that now.     He should still see the orthopedic(s) doctor and follow up with me in 4-6 weeks     I want to recheck the magnesium level when we draw blood. I would recommend(ed) that we do that a week before his follow up with me.   Labs orders were placed today.

## 2019-07-08 NOTE — TELEPHONE ENCOUNTER
Called the number in demographics that is listed for Adriana KALYN ([phone]) and left voicemail to return my call.  Did not leave any patient information as recording doesn't identify herself.    Molly Izquierdo BSN, RN

## 2019-07-10 NOTE — TELEPHONE ENCOUNTER
Called Adriana at the other number listed for her and she agrees to interpret the below message to patient.   Gave Adriana message below and she wrote it down word for word and will contact patient now.  I gave her my direct number to call me back at if patient has questions.     Patient has pending ortho appointment tomorrow and pending PCP appointment 7/30/19.    Molly DILLONN, RN

## 2019-07-11 ENCOUNTER — ANCILLARY PROCEDURE (OUTPATIENT)
Dept: GENERAL RADIOLOGY | Facility: CLINIC | Age: 54
End: 2019-07-11
Attending: ORTHOPAEDIC SURGERY
Payer: COMMERCIAL

## 2019-07-11 ENCOUNTER — OFFICE VISIT (OUTPATIENT)
Dept: ORTHOPEDICS | Facility: CLINIC | Age: 54
End: 2019-07-11
Payer: COMMERCIAL

## 2019-07-11 VITALS
OXYGEN SATURATION: 96 % | DIASTOLIC BLOOD PRESSURE: 85 MMHG | HEART RATE: 83 BPM | WEIGHT: 258 LBS | BODY MASS INDEX: 37.02 KG/M2 | SYSTOLIC BLOOD PRESSURE: 148 MMHG

## 2019-07-11 DIAGNOSIS — M17.0 OSTEOARTHRITIS OF BOTH KNEES, UNSPECIFIED OSTEOARTHRITIS TYPE: Primary | ICD-10-CM

## 2019-07-11 DIAGNOSIS — G89.29 CHRONIC PAIN OF BOTH KNEES: ICD-10-CM

## 2019-07-11 DIAGNOSIS — M25.561 CHRONIC PAIN OF BOTH KNEES: ICD-10-CM

## 2019-07-11 DIAGNOSIS — M25.562 CHRONIC PAIN OF BOTH KNEES: ICD-10-CM

## 2019-07-11 PROCEDURE — 73562 X-RAY EXAM OF KNEE 3: CPT | Mod: LT

## 2019-07-11 PROCEDURE — 99244 OFF/OP CNSLTJ NEW/EST MOD 40: CPT | Performed by: ORTHOPAEDIC SURGERY

## 2019-07-11 NOTE — LETTER
7/11/2019         RE: Loy Worthington  [address] [place] [address]        Dear Colleague,    Thank you for referring your patient, Loy Worthington, to the [facility]. Please see a copy of my visit note below.    SUBJECTIVE:   Loy Worthington is a 54 year old male who is seen in consultation at the request of Dr. Sanches for evaluation of bilateral knee pain, right>left.   His right knee started having pain in May 2019 without injury. Many severe episodes of night throbbing pain. He reports swelling and a bump in the knee. Pain is located over the proximal anterolateral and anteromedial knee. Shooting pain with ambulation. Denies mechanical symptoms.   Patient twisted his left knee about 1.5 months ago. Left knee was treated with chiropractic therapy. Since then, pain is tolerable but continues to have pain.     He was seen with an  and discussed his condition with his brother, who is a doctor, on the phone in clinic today.     Present symptoms: ongoing bilateral knee pain, right distal thigh pain   Symptoms occur when: with walking    Treatments tried to this point: none    Orthopedic PMH: none    Review of Systems:  Constitutional:  NEGATIVE for fever, chills, change in weight  Integumentary/Skin:  NEGATIVE for worrisome rashes, moles or lesions  Eyes:  NEGATIVE for vision changes or irritation  ENT/Mouth:  NEGATIVE for ear, mouth and throat problems  Resp:  NEGATIVE for significant cough or SOB  Breast:  NEGATIVE for masses, tenderness or discharge  CV:  NEGATIVE for chest pain, palpitations or peripheral edema  GI:  NEGATIVE for nausea, abdominal pain, heartburn, or change in bowel habits  :  Negative   Musculoskeletal:  See HPI above  Neuro:  NEGATIVE for weakness, dizziness or paresthesias  Endocrine:  NEGATIVE for temperature intolerance, skin/hair changes  Heme/allergy/immune:  NEGATIVE for bleeding problems  Psychiatric:  NEGATIVE for changes in mood or affect    Past Medical  History:   Past Medical History:   Diagnosis Date     Arthritis     hand- joint pain     Bilateral corneal scars 04/07/2011    Molten metal in right eye, acid in left eye     Brain injury (H)     fall from a great height     Cognitive deficits      Hematuria 03/19/2009     Hypercholesterolemia      Hypertension      Low back pain 03/19/2009     Pain in joint, forearm 11/11/2008     Renal cyst 04/06/2009     Trigger finger (acquired) 11/11/2008    right long     Past Surgical History:   Past Surgical History:   Procedure Laterality Date     CHOLECYSTECTOMY       ENDOSCOPY  12/12/2005    upper GI endoscopy     ESOPHAGOSCOPY, GASTROSCOPY, DUODENOSCOPY (EGD), COMBINED N/A 1/25/2016    Procedure: COMBINED ESOPHAGOSCOPY, GASTROSCOPY, DUODENOSCOPY (EGD);  Surgeon: David Campos MD;  Location:  OR     ESOPHAGOSCOPY, GASTROSCOPY, DUODENOSCOPY (EGD), COMBINED N/A 1/25/2016    Procedure: COMBINED ESOPHAGOSCOPY, GASTROSCOPY, DUODENOSCOPY (EGD), BIOPSY SINGLE OR MULTIPLE;  Surgeon: Davdi Campos MD;  Location:  OR     H ABLATION ATRIAL FLUTTER  May 2016    Dr Cardenas at [facility]     NASAL/SINUS POLYPECTOMY      Nasal-Sinus Polypectomy     SURGICAL HISTORY OF -       cale holes, subdural due to fall     SURGICAL HISTORY OF -       ?tympanoplasty/mastoid with brain trauma     Family History:   Family History   Problem Relation Age of Onset     Hypertension Father      Hypertension Brother      Macular Degeneration No family hx of      Glaucoma No family hx of      Thyroid Disease No family hx of      Cancer No family hx of      Diabetes No family hx of      Cerebrovascular Disease No family hx of      Social History:   Social History     Tobacco Use     Smoking status: Never Smoker     Smokeless tobacco: Never Used     Tobacco comment: Lives in smoke free household   Substance Use Topics     Alcohol use: No     This document serves as a record of the services and decisions personally performed and made  by KARLEY Foster MD. It was created on his behalf by Larisa Resendiz, a trained medical scribe. The creation of this document is based the provider's statements to the medical scribe.    Scribe Larisa Resendiz 4:38 PM 7/11/2019         OBJECTIVE:  Physical Exam:  /85 (BP Location: Right arm, Patient Position: Sitting, Cuff Size: Adult Regular)   Pulse 83   Wt 117 kg (258 lb)   SpO2 96%   BMI 37.02 kg/m     General Appearance: healthy, alert and no distress   Skin: no suspicious lesions or rashes  Neuro: Normal strength and tone, mentation intact and speech normal  Vascular: good pulses, and capillary refill   Lymph: no lymphadenopathy   Psych:  mentation appears normal and affect normal/bright  Resp: no increased work of breathing     Bilateral Knee Exam:  No pain with hip range of motion.  Gait: walks with normal gait  Squat: 70 % limited by pain in right knee.     Left Knee Right Knee   Alignment normal  normal    Patellofemoral Joint moderate crepitations moderate crepitations   Effusion mild mild   ROM 0-140 degrees  0-120*   Tender mild medial joint line  Medial joint line,    Ligaments Lachman's: stable  Anterior/Posterior Drawer: stable  Varus/Valgus stress: stable Lachman's: stable  Anterior/Posterior Drawer: stable  Varus/Valgus stress: stable   McMurrays negative positive     X-rays:  Obtained x-rays of the bilateral knees: 3-views, reviewed in the office with the patient by myself today and show  Right knee: minimal medial compartment degenerative changes and mild to moderate degenerative changes in the patellofemoral joint.   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      ASSESSMENT:   1. Bilateral knees mild osteoarthritis, possible right knee medial meniscus tear.     PLAN:   We discussed treatment options today. Possible right knee meniscus tear. I offered a couple options today, the first being an MRI.  The second option would be a steroid  injection.   MRI was ordered and the patient was advised to return to clinic once done with MRI to discuss results and treatment options.       The information in this document, created by a scribe for me, accurately reflects the services I personally performed and the decisions made by me. I have reviewed and approved this document for accuracy.      KARLEY Foster MD  Dept. Orthopedic Surgery  [facility]         Again, thank you for allowing me to participate in the care of your patient.        Sincerely,        Jose Antonio Foster MD

## 2019-07-15 ENCOUNTER — ANCILLARY PROCEDURE (OUTPATIENT)
Dept: MRI IMAGING | Facility: CLINIC | Age: 54
End: 2019-07-15
Attending: ORTHOPAEDIC SURGERY
Payer: COMMERCIAL

## 2019-07-15 DIAGNOSIS — M17.0 OSTEOARTHRITIS OF BOTH KNEES, UNSPECIFIED OSTEOARTHRITIS TYPE: ICD-10-CM

## 2019-07-15 PROCEDURE — 73721 MRI JNT OF LWR EXTRE W/O DYE: CPT | Mod: TC

## 2019-07-18 ENCOUNTER — OFFICE VISIT (OUTPATIENT)
Dept: ORTHOPEDICS | Facility: CLINIC | Age: 54
End: 2019-07-18
Payer: COMMERCIAL

## 2019-07-18 VITALS
HEIGHT: 70 IN | BODY MASS INDEX: 36.94 KG/M2 | SYSTOLIC BLOOD PRESSURE: 149 MMHG | WEIGHT: 258 LBS | DIASTOLIC BLOOD PRESSURE: 85 MMHG | OXYGEN SATURATION: 97 % | HEART RATE: 69 BPM

## 2019-07-18 DIAGNOSIS — S83.241D OTHER TEAR OF MEDIAL MENISCUS OF RIGHT KNEE AS CURRENT INJURY, SUBSEQUENT ENCOUNTER: Primary | ICD-10-CM

## 2019-07-18 DIAGNOSIS — M17.11 OSTEOARTHRITIS OF RIGHT KNEE, UNSPECIFIED OSTEOARTHRITIS TYPE: ICD-10-CM

## 2019-07-18 PROCEDURE — 99214 OFFICE O/P EST MOD 30 MIN: CPT | Performed by: ORTHOPAEDIC SURGERY

## 2019-07-18 ASSESSMENT — MIFFLIN-ST. JEOR: SCORE: 2011.53

## 2019-07-18 NOTE — PATIENT INSTRUCTIONS
-  brace as needed. This was ordered today.  - Can also consider a cortisone injection for pain.   - Please call if / when you'd like to consider surgery.

## 2019-07-18 NOTE — LETTER
7/18/2019         RE: Loy Worthington  [address] [place] [address]        Dear Colleague,    Thank you for referring your patient, Loy Worthington, to the [facility]. Please see a copy of my visit note below.    SUBJECTIVE:  Loy Worthington returns for MRI results from 7/15/2019 of the right knee, which are reviewed with the patient by myself and showed:    IMPRESSION:  1. Prominent medial meniscal tearing.  2. Medial femoral condyle subchondral insufficiency fracture.  3. Three compartment chondromalacia, including grade 4 involvement.  Osseous and Cartilaginous Structures: There is a small subchondral  insufficiency fracture of the medial femoral condyle (series 6 images  15-16) with adjacent reactive bone edema. Chondromalacia of the  lateral tibial plateau posteriorly, including a small focus of grade 4  involvement. There is medial compartment chondromalacia including  grade 4 involvement of the medial femoral condyle. There is  patellofemoral chondromalacia including grade 4 involvement of the  patella.  4. Moderate anterior cruciate ligament cyst formation.  5. Synovitis and prominent effusion. Loose bodies cannot be excluded.     here today. Today he presents with ongoing pain. Symptoms have not changed since last visit. For work, he is only his feet/knees a lot, also uses a lot of stairs.     Review of Systems:  Constitutional/General: Negative for fever, chills, change in weight  Integumentary/Skin: Negative for worrisome rashes, moles, or lesions  Neuro: Negative for weakness, dizziness, or paresthesias   Psychiatric: negative for changes in mood or affect    This document serves as a record of the services and decisions personally performed and made by KARLEY Foster MD. It was created on his behalf by Larisa Resendiz, a trained medical scribe. The creation of this document is based the provider's statements to the medical scribe.    Scribe Larisa Resendiz 10:20 AM 7/18/2019     "    OBJECTIVE:  Physical Exam:  /85 (BP Location: Right arm, Patient Position: Sitting, Cuff Size: Adult Regular)   Pulse 69   Ht 1.778 m (5' 10\")   Wt 117 kg (258 lb)   SpO2 97%   BMI 37.02 kg/m     General Appearance: healthy, alert and no distress   Skin: no suspicious lesions or rashes  Neuro: Normal strength and tone, mentation intact and speech normal  Vascular: good pulses, and capillary refill   Lymph: no lymphadenopathy   Psych:  mentation appears normal and affect normal/bright  Resp: no increased work of breathing    Right knee exam:  Palpation: medial knee.       ASSESSMENT:   1. Right knee medial meniscus tear   2. Right knee osteoarthritis   3. Right knee stress reaction/insufficiency fracture of the medial femoral condyle       PLAN:  Surgery was recommended, which would involve right knee arthroscopy, partial medial meniscectomy and possible micro fracture.     Surgery was recommended which would involve knee scope, partial medial meniscectomy and possible micro fracture.  Part of which would depend on his ability and willingness to be limited weight bearing postoperative.    Offered  brace, which he will explore.     He wants to hold off on surgery currently. In the meantime, offered  brace. Discussed he my need to check with insurance first.   Patient will want to talk about surgical treatment with his family prior to committing to surgery.    Return to clinic: as needed     Total time spent was 25 minutes; with 20 minutes spent face-to-face with patient dedicated to education, counseling and a development of a treatment plan.    The information in this document, created by a scribe for me, accurately reflects the services I personally performed and the decisions made by me. I have reviewed and approved this document for accuracy.      KARLEY Foster MD  Dept. Orthopedic Surgery  [facility]    Again, thank you for allowing me to participate in the care of " your patient.        Sincerely,        Jose Antonio Foster MD

## 2019-07-18 NOTE — PROGRESS NOTES
"SUBJECTIVE:  Loy Worthington returns for MRI results from 7/15/2019 of the right knee, which are reviewed with the patient by myself and showed:    IMPRESSION:  1. Prominent medial meniscal tearing.  2. Medial femoral condyle subchondral insufficiency fracture.  3. Three compartment chondromalacia, including grade 4 involvement.  Osseous and Cartilaginous Structures: There is a small subchondral  insufficiency fracture of the medial femoral condyle (series 6 images  15-16) with adjacent reactive bone edema. Chondromalacia of the  lateral tibial plateau posteriorly, including a small focus of grade 4  involvement. There is medial compartment chondromalacia including  grade 4 involvement of the medial femoral condyle. There is  patellofemoral chondromalacia including grade 4 involvement of the  patella.  4. Moderate anterior cruciate ligament cyst formation.  5. Synovitis and prominent effusion. Loose bodies cannot be excluded.     here today. Today he presents with ongoing pain. Symptoms have not changed since last visit. For work, he is only his feet/knees a lot, also uses a lot of stairs.     Review of Systems:  Constitutional/General: Negative for fever, chills, change in weight  Integumentary/Skin: Negative for worrisome rashes, moles, or lesions  Neuro: Negative for weakness, dizziness, or paresthesias   Psychiatric: negative for changes in mood or affect    This document serves as a record of the services and decisions personally performed and made by KARLEY Foster MD. It was created on his behalf by Larisa Resendiz, a trained medical scribe. The creation of this document is based the provider's statements to the medical scribe.    Scribe Larisa Resendiz 10:20 AM 7/18/2019        OBJECTIVE:  Physical Exam:  /85 (BP Location: Right arm, Patient Position: Sitting, Cuff Size: Adult Regular)   Pulse 69   Ht 1.778 m (5' 10\")   Wt 117 kg (258 lb)   SpO2 97%   BMI 37.02 kg/m    General Appearance: " healthy, alert and no distress   Skin: no suspicious lesions or rashes  Neuro: Normal strength and tone, mentation intact and speech normal  Vascular: good pulses, and capillary refill   Lymph: no lymphadenopathy   Psych:  mentation appears normal and affect normal/bright  Resp: no increased work of breathing    Right knee exam:  Palpation: medial knee.       ASSESSMENT:   1. Right knee medial meniscus tear   2. Right knee osteoarthritis   3. Right knee stress reaction/insufficiency fracture of the medial femoral condyle       PLAN:  Surgery was recommended, which would involve right knee arthroscopy, partial medial meniscectomy and possible micro fracture.     Surgery was recommended which would involve knee scope, partial medial meniscectomy and possible micro fracture.  Part of which would depend on his ability and willingness to be limited weight bearing postoperative.    Offered  brace, which he will explore.     He wants to hold off on surgery currently. In the meantime, offered  brace. Discussed he my need to check with insurance first.   Patient will want to talk about surgical treatment with his family prior to committing to surgery.    Return to clinic: as needed     Total time spent was 25 minutes; with 20 minutes spent face-to-face with patient dedicated to education, counseling and a development of a treatment plan.    The information in this document, created by a scribe for me, accurately reflects the services I personally performed and the decisions made by me. I have reviewed and approved this document for accuracy.      KARLEY Foster MD  Dept. Orthopedic Surgery  [facility]

## 2019-07-28 DIAGNOSIS — I10 ESSENTIAL HYPERTENSION WITH GOAL BLOOD PRESSURE LESS THAN 140/90: ICD-10-CM

## 2019-07-30 ENCOUNTER — OFFICE VISIT (OUTPATIENT)
Dept: FAMILY MEDICINE | Facility: CLINIC | Age: 54
End: 2019-07-30
Payer: COMMERCIAL

## 2019-07-30 VITALS
WEIGHT: 258 LBS | RESPIRATION RATE: 20 BRPM | SYSTOLIC BLOOD PRESSURE: 132 MMHG | BODY MASS INDEX: 37.02 KG/M2 | OXYGEN SATURATION: 97 % | HEART RATE: 70 BPM | TEMPERATURE: 98.9 F | DIASTOLIC BLOOD PRESSURE: 81 MMHG

## 2019-07-30 DIAGNOSIS — I10 HYPERTENSION GOAL BP (BLOOD PRESSURE) < 140/90: ICD-10-CM

## 2019-07-30 DIAGNOSIS — I10 ESSENTIAL HYPERTENSION WITH GOAL BLOOD PRESSURE LESS THAN 140/90: ICD-10-CM

## 2019-07-30 DIAGNOSIS — M79.10 MYALGIA: ICD-10-CM

## 2019-07-30 LAB
ALBUMIN SERPL-MCNC: 4.2 G/DL (ref 3.4–5)
ANION GAP SERPL CALCULATED.3IONS-SCNC: 8 MMOL/L (ref 3–14)
BUN SERPL-MCNC: 15 MG/DL (ref 7–30)
CALCIUM SERPL-MCNC: 9.3 MG/DL (ref 8.5–10.1)
CHLORIDE SERPL-SCNC: 108 MMOL/L (ref 94–109)
CK SERPL-CCNC: 295 U/L (ref 30–300)
CO2 SERPL-SCNC: 26 MMOL/L (ref 20–32)
CREAT SERPL-MCNC: 0.79 MG/DL (ref 0.66–1.25)
GFR SERPL CREATININE-BSD FRML MDRD: >90 ML/MIN/{1.73_M2}
GLUCOSE SERPL-MCNC: 101 MG/DL (ref 70–99)
LDH SERPL L TO P-CCNC: 220 U/L (ref 85–227)
MAGNESIUM SERPL-MCNC: 2.4 MG/DL (ref 1.6–2.3)
PHOSPHATE SERPL-MCNC: 3.9 MG/DL (ref 2.5–4.5)
POTASSIUM SERPL-SCNC: 3.8 MMOL/L (ref 3.4–5.3)
SODIUM SERPL-SCNC: 142 MMOL/L (ref 133–144)

## 2019-07-30 PROCEDURE — 80069 RENAL FUNCTION PANEL: CPT | Performed by: FAMILY MEDICINE

## 2019-07-30 PROCEDURE — 99214 OFFICE O/P EST MOD 30 MIN: CPT | Performed by: FAMILY MEDICINE

## 2019-07-30 PROCEDURE — 83735 ASSAY OF MAGNESIUM: CPT | Performed by: FAMILY MEDICINE

## 2019-07-30 PROCEDURE — 82550 ASSAY OF CK (CPK): CPT | Performed by: FAMILY MEDICINE

## 2019-07-30 PROCEDURE — 86038 ANTINUCLEAR ANTIBODIES: CPT | Performed by: FAMILY MEDICINE

## 2019-07-30 PROCEDURE — 83615 LACTATE (LD) (LDH) ENZYME: CPT | Performed by: FAMILY MEDICINE

## 2019-07-30 PROCEDURE — 36415 COLL VENOUS BLD VENIPUNCTURE: CPT | Performed by: FAMILY MEDICINE

## 2019-07-30 RX ORDER — CARVEDILOL 25 MG/1
50 TABLET ORAL 2 TIMES DAILY WITH MEALS
Qty: 120 TABLET | Refills: 1 | Status: SHIPPED | OUTPATIENT
Start: 2019-07-30 | End: 2019-10-06

## 2019-07-30 ASSESSMENT — PAIN SCALES - GENERAL: PAINLEVEL: NO PAIN (0)

## 2019-07-30 NOTE — NURSING NOTE
"Chief Complaint   Patient presents with     Hypertension     Health Maintenance       Initial BP (!) 148/85   Pulse 70   Temp 98.9  F (37.2  C) (Oral)   Resp 20   Wt 117 kg (258 lb)   SpO2 97%   BMI 37.02 kg/m   Estimated body mass index is 37.02 kg/m  as calculated from the following:    Height as of 7/18/19: 1.778 m (5' 10\").    Weight as of this encounter: 117 kg (258 lb).  Medication Reconciliation: complete  Cyndie Ang CMA  "

## 2019-07-30 NOTE — PROGRESS NOTES
SUBJECTIVE:  Loy Worthington is an 54 year old male who presents for a follow up evaluation of his hypertension.The patient had the dose of doxazosin  raised to 8 mg at the last visit. The patient reports that he IS taking the medication as prescribed. He denies side effects of medication.    He reports that he urinates a lot in the morning after taking the lasix. He is late to work occasionally because of that.     He had the MRI of his right knee and is thinking to have surgery in the fall or winter with Dr Foster. He has projects to finish before then.      Patient Active Problem List   Diagnosis     Brain injury (H)     Renal mass     Migraine headache     CARDIOVASCULAR SCREENING; LDL GOAL LESS THAN 130     Hypertension goal BP (blood pressure) < 140/90     Bilateral corneal scars     High triglycerides     Familial hypoalphalipoproteinemia     Vitamin D deficiency     Hyperlipidemia LDL goal <130     Vitamin D deficiency disease     GERD (gastroesophageal reflux disease)     Tubular adenoma of colon     Paroxysmal atrial fibrillation (H)     Hematospermia     Lung nodule, solitary, Needs chest CT in Jan 2017     S/P laparoscopic cholecystectomy     Pulmonary nodule, right lung base needs fu scan     HDL deficiency     Diverticulosis of large intestine     Obesity (BMI 35.0-39.9) with comorbidity (H)     Post concussion syndrome       Is the HYPERTENSION goal on the problem list? Yes      Use of agents associated with hypertension: none  Current Outpatient Medications   Medication     carvedilol (COREG) 25 MG tablet     Cholecalciferol (VITAMIN D3) 2000 units CAPS     doxazosin (CARDURA) 8 MG tablet     furosemide (LASIX) 40 MG tablet     lisinopril (PRINIVIL/ZESTRIL) 40 MG tablet     omeprazole (PRILOSEC) 40 MG DR capsule     order for DME     rivaroxaban ANTICOAGULANT (XARELTO) 20 MG TABS tablet     SUMAtriptan (IMITREX) 50 MG tablet     No current facility-administered medications for this visit.           Allergies   Allergen Reactions     Zocor [Simvastatin - High Dose] Swelling     Redness,itching,swelling         Social History     Tobacco Use     Smoking status: Never Smoker     Smokeless tobacco: Never Used     Tobacco comment: Lives in smoke free household   Substance Use Topics     Alcohol use: No       OBJECTIVE:  /81   Pulse 70   Temp 98.9  F (37.2  C) (Oral)   Resp 20   Wt 117 kg (258 lb)   SpO2 97%   BMI 37.02 kg/m      Heart: negative, PMI normal. No lifts, heaves, or thrills. RRR. No murmurs, clicks gallops or rub  Lower Extremities:1+ edema on right and 1+  edema on the left        Results for orders placed or performed in visit on 07/15/19   MR Knee Right w/o Contrast    Narrative    MR KNEE RIGHT WITHOUT CONTRAST July 15, 2019 1:43 PM    HISTORY: Arthritis, knee. Osteoarthritis of both knees, unspecified  osteoarthritis type. Knee pain.     TECHNIQUE: Multiplanar, multisequence without contrast.    FINDINGS:   Medial Meniscus: Multifocal tearing of the posterior horn, including  deficiency or an unstable appearance more laterally. There is  extension of both surfaces. Vertical-oblique tearing of the body  segment with inferior extension. Moderate extrusion of the body  segment.       Lateral Meniscus: No tear, displaced fragment, or extrusion.        Anterior Cruciate Ligament: Moderate cystic change extending along and  between ligament fibers. No tear identified.     Posterior Cruciate Ligament: Unremarkable. No sprain or tear  identified.     Medial Collateral Ligament: No sprain or tear identified.    Lateral Collateral Ligament Complex, Popliteus Tendon: The iliotibial  band, fibular collateral ligament, biceps femoris tendon, and  popliteus tendon are intact.    Osseous and Cartilaginous Structures: There is a small subchondral  insufficiency fracture of the medial femoral condyle (series 6 images  15-16) with adjacent reactive bone edema. Chondromalacia of the  lateral  tibial plateau posteriorly, including a small focus of grade 4  involvement. There is medial compartment chondromalacia including  grade 4 involvement of the medial femoral condyle. There is  patellofemoral chondromalacia including grade 4 involvement of the  patella.    Extensor Mechanism: The quadriceps and patellar tendons are intact.  The medial and lateral patellar retinacula appear unremarkable.    Joint Space: Synovitis and prominent joint effusion. There are some  small scattered intra-articular densities. This could represent  debris, but loose bodies cannot be excluded.    Additional Findings: No Baker's cyst. No semimembranosus-tibial  collateral ligament or pes anserine bursitis. Nonspecific soft tissue  edema. Popliteus tenosynovitis.         Impression    IMPRESSION:  1. Prominent medial meniscal tearing.  2. Medial femoral condyle subchondral insufficiency fracture.  3. Three compartment chondromalacia, including grade 4 involvement.  4. Moderate anterior cruciate ligament cyst formation.  5. Synovitis and prominent effusion. Loose bodies cannot be excluded.    EZEKIEL SINGH MD       The 10-year ASCVD risk score (Rafael GERRI Jr., et al., 2013) is: 7.9%    Values used to calculate the score:      Age: 54 years      Sex: Male      Is Non- : No      Diabetic: No      Tobacco smoker: No      Systolic Blood Pressure: 132 mmHg      Is BP treated: Yes      HDL Cholesterol: 34 mg/dL      Total Cholesterol: 168 mg/dL    ASSESSMENT:  Essential hypertension which is marginally controlled.       Plan:  - Medication:increase the dose of carvedilol to 50 mg bid.     The patient was advised to do the following therapuetic life style changes  - Dietary sodium restriction and increase potassium and Calcium intake  - Regular aerobic exercise  - Weight loss  - Discontinue smoking if applicable  - Avoid regular NSAID use if applicable  - Avoid regular decongestant use if applicable  - Follow up in  clinic in 4 weeks for a recheck  - Check a basic metabolic panel today    Patient Education: Reviewed risks of hypertension and principles of   Treatment.    To laboratory today to recheck his Magnesium. He has stopped taking the supplement(al) magnesium

## 2019-07-30 NOTE — TELEPHONE ENCOUNTER
Patient has scheduled appointment today.  Will postpone message to be certain this is addressed.   Dalia Guevara RN

## 2019-07-31 LAB — ANA SER QL IF: NEGATIVE

## 2019-07-31 RX ORDER — CARVEDILOL 25 MG/1
TABLET ORAL
Qty: 60 TABLET | Refills: 0 | OUTPATIENT
Start: 2019-07-31

## 2019-07-31 NOTE — RESULT ENCOUNTER NOTE
Loy,  I have reviewed the results of the laboratory tests that we recently ordered. All of the lab work performed was normal or considered normal for you except the magnesium was still just slightly elevated. A high magnesium does not cause any problems until it is much higher so we do not need to do anything about that right now. Please follow up in clinic for a blood pressure recheck as we discussed.   Sincerely,   Hussein Sanches

## 2019-08-27 DIAGNOSIS — G43.809 OTHER MIGRAINE WITHOUT STATUS MIGRAINOSUS, NOT INTRACTABLE: ICD-10-CM

## 2019-08-28 RX ORDER — SUMATRIPTAN 50 MG/1
TABLET, FILM COATED ORAL
Qty: 27 TABLET | Refills: 3 | Status: SHIPPED | OUTPATIENT
Start: 2019-08-28 | End: 2020-07-06

## 2019-09-03 ENCOUNTER — OFFICE VISIT (OUTPATIENT)
Dept: FAMILY MEDICINE | Facility: CLINIC | Age: 54
End: 2019-09-03
Payer: COMMERCIAL

## 2019-09-03 VITALS
SYSTOLIC BLOOD PRESSURE: 145 MMHG | WEIGHT: 259 LBS | DIASTOLIC BLOOD PRESSURE: 86 MMHG | BODY MASS INDEX: 37.16 KG/M2 | HEART RATE: 63 BPM | OXYGEN SATURATION: 98 % | RESPIRATION RATE: 12 BRPM | TEMPERATURE: 99.2 F

## 2019-09-03 DIAGNOSIS — I10 HYPERTENSION GOAL BP (BLOOD PRESSURE) < 140/90: Primary | ICD-10-CM

## 2019-09-03 DIAGNOSIS — Z76.89 HEALTH CARE HOME: Primary | ICD-10-CM

## 2019-09-03 PROCEDURE — 99213 OFFICE O/P EST LOW 20 MIN: CPT | Performed by: FAMILY MEDICINE

## 2019-09-03 RX ORDER — SPIRONOLACTONE 25 MG/1
25 TABLET ORAL DAILY
Qty: 30 TABLET | Refills: 1 | Status: SHIPPED | OUTPATIENT
Start: 2019-09-03 | End: 2019-09-30

## 2019-09-03 NOTE — PROGRESS NOTES
SUBJECTIVE:  Loy Worthington is an 54 year old male who presents for a follow up evaluation of his hypertension.The patient had the dose of Carvedilol raised to 50 mg at the last visit. The patient reports that he IS taking the medication as prescribed. He denies side effects of medication.    Patient Active Problem List   Diagnosis     Brain injury (H)     Renal mass     Migraine headache     CARDIOVASCULAR SCREENING; LDL GOAL LESS THAN 130     Hypertension goal BP (blood pressure) < 140/90     Bilateral corneal scars     High triglycerides     Familial hypoalphalipoproteinemia     Vitamin D deficiency     Hyperlipidemia LDL goal <130     Vitamin D deficiency disease     GERD (gastroesophageal reflux disease)     Tubular adenoma of colon     Paroxysmal atrial fibrillation (H)     Hematospermia     Lung nodule, solitary, Needs chest CT in Jan 2017     S/P laparoscopic cholecystectomy     Pulmonary nodule, right lung base needs fu scan     HDL deficiency     Diverticulosis of large intestine     Obesity (BMI 35.0-39.9) with comorbidity (H)     Post concussion syndrome       Is the HYPERTENSION goal on the problem list? Yes      Use of agents associated with hypertension: none  Current Outpatient Medications   Medication     carvedilol (COREG) 25 MG tablet     Cholecalciferol (VITAMIN D3) 2000 units CAPS     doxazosin (CARDURA) 8 MG tablet     furosemide (LASIX) 40 MG tablet     lisinopril (PRINIVIL/ZESTRIL) 40 MG tablet     omeprazole (PRILOSEC) 40 MG DR capsule     order for DME     rivaroxaban ANTICOAGULANT (XARELTO) 20 MG TABS tablet     spironolactone (ALDACTONE) 25 MG tablet     SUMAtriptan (IMITREX) 50 MG tablet     No current facility-administered medications for this visit.          Allergies   Allergen Reactions     Zocor [Simvastatin - High Dose] Swelling     Redness,itching,swelling         Social History     Tobacco Use     Smoking status: Never Smoker     Smokeless tobacco: Never Used     Tobacco comment:  Lives in smoke free household   Substance Use Topics     Alcohol use: No       OBJECTIVE:  BP (!) 145/86   Pulse 63   Temp 99.2  F (37.3  C) (Oral)   Resp 12   Wt 117.5 kg (259 lb)   SpO2 98%   BMI 37.16 kg/m      Heart: negative, PMI normal. No lifts, heaves, or thrills. RRR. No murmurs, clicks gallops or rub  Lower Extremities:1+ edema on right and 1+  edema on the left above the tops of his socks        Results for orders placed or performed in visit on 07/30/19   Renal panel   Result Value Ref Range    Sodium 142 133 - 144 mmol/L    Potassium 3.8 3.4 - 5.3 mmol/L    Chloride 108 94 - 109 mmol/L    Carbon Dioxide 26 20 - 32 mmol/L    Anion Gap 8 3 - 14 mmol/L    Glucose 101 (H) 70 - 99 mg/dL    Urea Nitrogen 15 7 - 30 mg/dL    Creatinine 0.79 0.66 - 1.25 mg/dL    GFR Estimate >90 >60 mL/min/[1.73_m2]    GFR Estimate If Black >90 >60 mL/min/[1.73_m2]    Calcium 9.3 8.5 - 10.1 mg/dL    Phosphorus 3.9 2.5 - 4.5 mg/dL    Albumin 4.2 3.4 - 5.0 g/dL   Lactate Dehydrogenase   Result Value Ref Range    Lactate Dehydrogenase 220 85 - 227 U/L   CK total   Result Value Ref Range    CK Total 295 30 - 300 U/L   Magnesium   Result Value Ref Range    Magnesium 2.4 (H) 1.6 - 2.3 mg/dL   Anti Nuclear Emilie IgG by IFA with Reflex   Result Value Ref Range    JIMENEZ interpretation Negative NEG^Negative       The 10-year ASCVD risk score ([place] DC Jr., et al., 2013) is: 9.3%    Values used to calculate the score:      Age: 54 years      Sex: Male      Is Non- : No      Diabetic: No      Tobacco smoker: No      Systolic Blood Pressure: 145 mmHg      Is BP treated: Yes      HDL Cholesterol: 34 mg/dL      Total Cholesterol: 168 mg/dL    ASSESSMENT:  Essential hypertension which is poorly controlled.       Plan:  - Medication:continue the current doses of medication. and start 25 mg of Spironolactone.    The patient was advised to do the following therapuetic life style changes  - Dietary sodium restriction and  increase potassium and Calcium intake  - Regular aerobic exercise  - Weight loss  - Discontinue smoking if applicable  - Avoid regular NSAID use if applicable  - Avoid regular decongestant use if applicable  - Follow up in clinic in 3-4 weeks for a recheck  - Check a basic metabolic panel today    Patient Education: Reviewed risks of hypertension and principles of   Treatment.      For his knee pain we will try acetaminophen    Patient Instructions   Try taking acetaminophen  (Tylenol) 1,000 mg every 6 hours as needed for knee pain. .

## 2019-09-04 ENCOUNTER — PATIENT OUTREACH (OUTPATIENT)
Dept: CARE COORDINATION | Facility: CLINIC | Age: 54
End: 2019-09-04

## 2019-09-04 NOTE — PROGRESS NOTES
Clinic Care Coordination Contact    Situation: Patient chart reviewed by care coordinator.    Background: patient was seen in er for hearing loss, patient was found to have cerumen impaction. Given prescription for debrox, and to follow up with clinic with questions or concern. Patient was also to have blood pressure recheck.     Assessment: patient was seen by PMD the following day. Patient didn't have further ear concerns. Blood pressure was addressed at visit at well. Patient has demonstrated close follow up related to blood pressure management in recent past.     Plan/Recommendations: no patient out reach will be done at this time.   SANTANA GonzalezN RN Clinic Care Coordinator   [place], [place], [place]  Phone: [phone]

## 2019-09-30 ENCOUNTER — OFFICE VISIT (OUTPATIENT)
Dept: FAMILY MEDICINE | Facility: CLINIC | Age: 54
End: 2019-09-30
Payer: COMMERCIAL

## 2019-09-30 VITALS
WEIGHT: 250 LBS | DIASTOLIC BLOOD PRESSURE: 68 MMHG | HEIGHT: 70 IN | HEART RATE: 70 BPM | BODY MASS INDEX: 35.79 KG/M2 | OXYGEN SATURATION: 99 % | TEMPERATURE: 98.9 F | RESPIRATION RATE: 20 BRPM | SYSTOLIC BLOOD PRESSURE: 105 MMHG

## 2019-09-30 DIAGNOSIS — I10 HYPERTENSION GOAL BP (BLOOD PRESSURE) < 140/90: ICD-10-CM

## 2019-09-30 PROCEDURE — 99213 OFFICE O/P EST LOW 20 MIN: CPT | Performed by: FAMILY MEDICINE

## 2019-09-30 RX ORDER — SPIRONOLACTONE 25 MG/1
12.5 TABLET ORAL DAILY
Qty: 30 TABLET | Refills: 1 | Status: SHIPPED | OUTPATIENT
Start: 2019-09-30 | End: 2019-11-01

## 2019-09-30 ASSESSMENT — MIFFLIN-ST. JEOR: SCORE: 1975.24

## 2019-09-30 ASSESSMENT — PAIN SCALES - GENERAL: PAINLEVEL: NO PAIN (0)

## 2019-09-30 NOTE — NURSING NOTE
"Chief Complaint   Patient presents with     Hypertension     thinks he is to low now, having some dizziness feels off.     Health Maintenance       Initial /73   Pulse 70   Temp 98.9  F (37.2  C) (Oral)   Resp 20   Ht 1.778 m (5' 10\")   Wt 113.4 kg (250 lb)   SpO2 99%   BMI 35.87 kg/m   Estimated body mass index is 35.87 kg/m  as calculated from the following:    Height as of this encounter: 1.778 m (5' 10\").    Weight as of this encounter: 113.4 kg (250 lb).  Medication Reconciliation: complete  Cyndie Ang CMA  "

## 2019-09-30 NOTE — PROGRESS NOTES
SUBJECTIVE:  Loy Worthington is an 54 year old male who presents for a follow up evaluation of his hypertension.The patient was started on 25 mg of spironolactone at the last visit. The patient reports that he IS taking the medication as prescribed. He reports side effects of medication.  These side effects include: possible(janett) dizziness  However he has chormoc dizz from his head injury.  It is not particularly worse now.         Patient Active Problem List   Diagnosis     Brain injury (H)     Renal mass     Migraine headache     CARDIOVASCULAR SCREENING; LDL GOAL LESS THAN 130     Hypertension goal BP (blood pressure) < 140/90     Bilateral corneal scars     High triglycerides     Familial hypoalphalipoproteinemia     Vitamin D deficiency     Hyperlipidemia LDL goal <130     Vitamin D deficiency disease     GERD (gastroesophageal reflux disease)     Tubular adenoma of colon     Paroxysmal atrial fibrillation (H)     Hematospermia     Lung nodule, solitary, Needs chest CT in Jan 2017     S/P laparoscopic cholecystectomy     Pulmonary nodule, right lung base needs fu scan     HDL deficiency     Diverticulosis of large intestine     Obesity (BMI 35.0-39.9) with comorbidity (H)     Post concussion syndrome       Is the HYPERTENSION goal on the problem list? Yes      Use of agents associated with hypertension: none  Current Outpatient Medications   Medication     carvedilol (COREG) 25 MG tablet     Cholecalciferol (VITAMIN D3) 2000 units CAPS     doxazosin (CARDURA) 8 MG tablet     furosemide (LASIX) 40 MG tablet     lisinopril (PRINIVIL/ZESTRIL) 40 MG tablet     omeprazole (PRILOSEC) 40 MG DR capsule     order for DME     rivaroxaban ANTICOAGULANT (XARELTO) 20 MG TABS tablet     spironolactone (ALDACTONE) 25 MG tablet     SUMAtriptan (IMITREX) 50 MG tablet     No current facility-administered medications for this visit.          Allergies   Allergen Reactions     Zocor [Simvastatin - High Dose] Swelling      "Redness,itching,swelling         Social History     Tobacco Use     Smoking status: Never Smoker     Smokeless tobacco: Never Used     Tobacco comment: Lives in smoke free household   Substance Use Topics     Alcohol use: No       OBJECTIVE:  /68   Pulse 70   Temp 98.9  F (37.2  C) (Oral)   Resp 20   Ht 1.778 m (5' 10\")   Wt 113.4 kg (250 lb)   SpO2 99%   BMI 35.87 kg/m     blood pressure  BP Readings from Last 6 Encounters:   09/30/19 105/68   09/03/19 (!) 145/86   07/30/19 132/81   07/18/19 149/85   07/11/19 148/85   07/03/19 139/82         Heart: negative, PMI normal. No lifts, heaves, or thrills. RRR. No murmurs, clicks gallops or rub  Lower Extremities:no edema on right and no  edema on the left        Results for orders placed or performed in visit on 07/30/19   Renal panel   Result Value Ref Range    Sodium 142 133 - 144 mmol/L    Potassium 3.8 3.4 - 5.3 mmol/L    Chloride 108 94 - 109 mmol/L    Carbon Dioxide 26 20 - 32 mmol/L    Anion Gap 8 3 - 14 mmol/L    Glucose 101 (H) 70 - 99 mg/dL    Urea Nitrogen 15 7 - 30 mg/dL    Creatinine 0.79 0.66 - 1.25 mg/dL    GFR Estimate >90 >60 mL/min/[1.73_m2]    GFR Estimate If Black >90 >60 mL/min/[1.73_m2]    Calcium 9.3 8.5 - 10.1 mg/dL    Phosphorus 3.9 2.5 - 4.5 mg/dL    Albumin 4.2 3.4 - 5.0 g/dL   Lactate Dehydrogenase   Result Value Ref Range    Lactate Dehydrogenase 220 85 - 227 U/L   CK total   Result Value Ref Range    CK Total 295 30 - 300 U/L   Magnesium   Result Value Ref Range    Magnesium 2.4 (H) 1.6 - 2.3 mg/dL   Anti Nuclear Emilie IgG by IFA with Reflex   Result Value Ref Range    JIMENEZ interpretation Negative NEG^Negative       The 10-year ASCVD risk score (Rafael TALLEY Jr., et al., 2013) is: 5.3%    Values used to calculate the score:      Age: 54 years      Sex: Male      Is Non- : No      Diabetic: No      Tobacco smoker: No      Systolic Blood Pressure: 105 mmHg      Is BP treated: Yes      HDL Cholesterol: 34 mg/dL      " Total Cholesterol: 168 mg/dL    ASSESSMENT:  Essential hypertension which is very well controlled but might be a little to low for him.       Plan:  - Medication:decrease the dose of spironolactone  to 12.5 mg.    The patient was advised to do the following therapuetic life style changes  - Dietary sodium restriction and increase potassium and Calcium intake  - Regular aerobic exercise  - Weight loss  - Discontinue smoking if applicable  - Avoid regular NSAID use if applicable  - Avoid regular decongestant use if applicable  - Follow up in clinic in 4 weeks for a recheck      Patient Education: Reviewed risks of hypertension and principles of   Treatment.

## 2019-10-06 DIAGNOSIS — I10 ESSENTIAL HYPERTENSION WITH GOAL BLOOD PRESSURE LESS THAN 140/90: ICD-10-CM

## 2019-10-07 RX ORDER — CARVEDILOL 25 MG/1
TABLET ORAL
Qty: 120 TABLET | Refills: 0 | Status: SHIPPED | OUTPATIENT
Start: 2019-10-07 | End: 2019-11-01

## 2019-11-01 ENCOUNTER — OFFICE VISIT (OUTPATIENT)
Dept: FAMILY MEDICINE | Facility: CLINIC | Age: 54
End: 2019-11-01
Payer: COMMERCIAL

## 2019-11-01 ENCOUNTER — TRANSFERRED RECORDS (OUTPATIENT)
Dept: HEALTH INFORMATION MANAGEMENT | Facility: CLINIC | Age: 54
End: 2019-11-01

## 2019-11-01 VITALS
RESPIRATION RATE: 20 BRPM | BODY MASS INDEX: 37.02 KG/M2 | SYSTOLIC BLOOD PRESSURE: 122 MMHG | DIASTOLIC BLOOD PRESSURE: 73 MMHG | OXYGEN SATURATION: 97 % | WEIGHT: 258 LBS | TEMPERATURE: 98.7 F | HEART RATE: 82 BPM

## 2019-11-01 DIAGNOSIS — I10 HYPERTENSION GOAL BP (BLOOD PRESSURE) < 140/90: ICD-10-CM

## 2019-11-01 DIAGNOSIS — I10 ESSENTIAL HYPERTENSION WITH GOAL BLOOD PRESSURE LESS THAN 140/90: ICD-10-CM

## 2019-11-01 LAB
ANION GAP SERPL CALCULATED.3IONS-SCNC: 7 MMOL/L (ref 3–14)
BUN SERPL-MCNC: 15 MG/DL (ref 7–30)
CALCIUM SERPL-MCNC: 9.3 MG/DL (ref 8.5–10.1)
CHLORIDE SERPL-SCNC: 109 MMOL/L (ref 94–109)
CO2 SERPL-SCNC: 25 MMOL/L (ref 20–32)
CREAT SERPL-MCNC: 0.79 MG/DL (ref 0.66–1.25)
GFR SERPL CREATININE-BSD FRML MDRD: >90 ML/MIN/{1.73_M2}
GLUCOSE SERPL-MCNC: 128 MG/DL (ref 70–99)
POTASSIUM SERPL-SCNC: 3.4 MMOL/L (ref 3.4–5.3)
SODIUM SERPL-SCNC: 141 MMOL/L (ref 133–144)

## 2019-11-01 PROCEDURE — 80048 BASIC METABOLIC PNL TOTAL CA: CPT | Performed by: FAMILY MEDICINE

## 2019-11-01 PROCEDURE — 36415 COLL VENOUS BLD VENIPUNCTURE: CPT | Performed by: FAMILY MEDICINE

## 2019-11-01 PROCEDURE — 99213 OFFICE O/P EST LOW 20 MIN: CPT | Performed by: FAMILY MEDICINE

## 2019-11-01 RX ORDER — CARVEDILOL 25 MG/1
25 TABLET ORAL 2 TIMES DAILY
Qty: 180 TABLET | Refills: 3 | COMMUNITY
Start: 2019-11-01 | End: 2020-11-10

## 2019-11-01 RX ORDER — SPIRONOLACTONE 25 MG/1
12.5 TABLET ORAL DAILY
Qty: 90 TABLET | Refills: 3 | Status: SHIPPED | OUTPATIENT
Start: 2019-11-01 | End: 2020-06-22

## 2019-11-01 RX ORDER — CARVEDILOL 25 MG/1
25 TABLET ORAL 2 TIMES DAILY
Qty: 180 TABLET | Refills: 3 | Status: SHIPPED | OUTPATIENT
Start: 2019-11-01 | End: 2019-11-01

## 2019-11-01 ASSESSMENT — PAIN SCALES - GENERAL: PAINLEVEL: NO PAIN (0)

## 2019-11-01 NOTE — PROGRESS NOTES
SUBJECTIVE:  Loy Worthington is an 54 year old male who presents for a follow up evaluation of his hypertension.The patient had the dose of spironolactone lowered to 12.5 mg at the last appointment(s).  The patient reports that he IS NOT taking the medication as prescribed. He reports he was feeling lightheadedness. He had an episode of black vision and weakness when he was rushing to do something. He checked his blood pressure and it was low at 102/58. He called his brother who is an internist and they dropped his carvedilol in half.  He reduced the carvedilol to 1 pill two time daily   He was previously taking 2 pills twice a day(s)         Patient Active Problem List   Diagnosis     Brain injury (H)     Renal mass     Migraine headache     CARDIOVASCULAR SCREENING; LDL GOAL LESS THAN 130     Hypertension goal BP (blood pressure) < 140/90     Bilateral corneal scars     High triglycerides     Familial hypoalphalipoproteinemia     Vitamin D deficiency     Hyperlipidemia LDL goal <130     Vitamin D deficiency disease     GERD (gastroesophageal reflux disease)     Tubular adenoma of colon     Paroxysmal atrial fibrillation (H)     Hematospermia     Lung nodule, solitary, Needs chest CT in Jan 2017     S/P laparoscopic cholecystectomy     Pulmonary nodule, right lung base needs fu scan     HDL deficiency     Diverticulosis of large intestine     Obesity (BMI 35.0-39.9) with comorbidity (H)     Post concussion syndrome       Is the HYPERTENSION goal on the problem list? Yes      Use of agents associated with hypertension: none  Current Outpatient Medications   Medication     carvedilol (COREG) 25 MG tablet     Cholecalciferol (VITAMIN D3) 2000 units CAPS     doxazosin (CARDURA) 8 MG tablet     furosemide (LASIX) 40 MG tablet     lisinopril (PRINIVIL/ZESTRIL) 40 MG tablet     omeprazole (PRILOSEC) 40 MG DR capsule     order for DME     rivaroxaban ANTICOAGULANT (XARELTO) 20 MG TABS tablet     spironolactone (ALDACTONE)  25 MG tablet     SUMAtriptan (IMITREX) 50 MG tablet     No current facility-administered medications for this visit.          Allergies   Allergen Reactions     Zocor [Simvastatin - High Dose] Swelling     Redness,itching,swelling         Social History     Tobacco Use     Smoking status: Never Smoker     Smokeless tobacco: Never Used     Tobacco comment: Lives in smoke free household   Substance Use Topics     Alcohol use: No       OBJECTIVE:  /73   Pulse 82   Temp 98.7  F (37.1  C) (Oral)   Resp 20   Wt 117 kg (258 lb)   SpO2 97%   BMI 37.02 kg/m      Heart: negative, PMI normal. No lifts, heaves, or thrills. RRR. No murmurs, clicks gallops or rub  Lower Extremities:1+ edema on right and 1+  edema on the left        No results found for any visits on 11/01/19.    The 10-year ASCVD risk score (Rafael TALLEY JrSatnam, et al., 2013) is: 6.9%    Values used to calculate the score:      Age: 54 years      Sex: Male      Is Non- : No      Diabetic: No      Tobacco smoker: No      Systolic Blood Pressure: 122 mmHg      Is BP treated: Yes      HDL Cholesterol: 34 mg/dL      Total Cholesterol: 168 mg/dL    ASSESSMENT:  Essential hypertension which is very well controlled.       Plan:  - Medication:continue the current doses of medication.  The patients antihypertensive medication was filled for 12 months.  The patient was advised to do the following therapuetic life style changes  - Dietary sodium restriction and increase potassium and Calcium intake  - Regular aerobic exercise  - Weight loss  - Discontinue smoking if applicable  - Avoid regular NSAID use if applicable  - Avoid regular decongestant use if applicable  - Follow up in clinic in 4 months for a complete physical exam   - Check a basic metabolic panel today    Patient Education: Reviewed risks of hypertension and principles of   Treatment.

## 2019-11-01 NOTE — NURSING NOTE
"Chief Complaint   Patient presents with     Hypertension     Health Maintenance       Initial BP (!) 148/72   Pulse 82   Temp 98.7  F (37.1  C) (Oral)   Resp 20   Wt 117 kg (258 lb)   SpO2 97%   BMI 37.02 kg/m   Estimated body mass index is 37.02 kg/m  as calculated from the following:    Height as of 9/30/19: 1.778 m (5' 10\").    Weight as of this encounter: 117 kg (258 lb).  Medication Reconciliation: complete  Cyndie Ang CMA  "

## 2019-12-28 DIAGNOSIS — I10 ESSENTIAL HYPERTENSION WITH GOAL BLOOD PRESSURE LESS THAN 140/90: ICD-10-CM

## 2019-12-28 DIAGNOSIS — R60.0 PEDAL EDEMA: ICD-10-CM

## 2019-12-28 DIAGNOSIS — R10.13 ABDOMINAL PAIN, EPIGASTRIC: ICD-10-CM

## 2019-12-30 RX ORDER — OMEPRAZOLE 40 MG/1
CAPSULE, DELAYED RELEASE ORAL
Qty: 90 CAPSULE | Refills: 0 | Status: SHIPPED | OUTPATIENT
Start: 2019-12-30 | End: 2020-03-02

## 2020-01-27 DIAGNOSIS — I10 ESSENTIAL HYPERTENSION WITH GOAL BLOOD PRESSURE LESS THAN 140/90: ICD-10-CM

## 2020-01-27 DIAGNOSIS — R10.13 ABDOMINAL PAIN, EPIGASTRIC: ICD-10-CM

## 2020-01-27 DIAGNOSIS — R60.0 PEDAL EDEMA: ICD-10-CM

## 2020-01-27 RX ORDER — DOXAZOSIN 8 MG/1
TABLET ORAL
Qty: 90 TABLET | Refills: 0 | Status: SHIPPED | OUTPATIENT
Start: 2020-01-27 | End: 2020-03-02

## 2020-01-27 NOTE — TELEPHONE ENCOUNTER
"Requested Prescriptions   Signed Prescriptions Disp Refills    doxazosin (CARDURA) 8 MG tablet 90 tablet 0     Sig: TAKE 1 TABLET BY MOUTH EVERY NIGHT AT BEDTIME       Alpha Blockers Passed - 1/27/2020  9:45 AM        Passed - Blood pressure under 140/90 in past 12 months     BP Readings from Last 3 Encounters:   11/01/19 122/73   09/30/19 105/68   09/03/19 (!) 145/86                 Passed - Recent (12 mo) or future (30 days) visit within the authorizing provider's specialty     Patient has had an office visit with the authorizing provider or a provider within the authorizing providers department within the previous 12 mos or has a future within next 30 days. See \"Patient Info\" tab in inbasket, or \"Choose Columns\" in Meds & Orders section of the refill encounter.              Passed - Patient does not have Tadalafil, Vardenafil, or Sildenafil on their medication list        Passed - Medication is active on med list        Passed - Patient is 18 years of age or older          "

## 2020-03-02 ENCOUNTER — OFFICE VISIT (OUTPATIENT)
Dept: FAMILY MEDICINE | Facility: CLINIC | Age: 55
End: 2020-03-02
Payer: COMMERCIAL

## 2020-03-02 VITALS
SYSTOLIC BLOOD PRESSURE: 141 MMHG | DIASTOLIC BLOOD PRESSURE: 96 MMHG | WEIGHT: 260 LBS | HEIGHT: 70 IN | HEART RATE: 78 BPM | TEMPERATURE: 98.6 F | BODY MASS INDEX: 37.22 KG/M2

## 2020-03-02 DIAGNOSIS — E78.1 HIGH TRIGLYCERIDES: ICD-10-CM

## 2020-03-02 DIAGNOSIS — I10 ESSENTIAL HYPERTENSION WITH GOAL BLOOD PRESSURE LESS THAN 140/90: ICD-10-CM

## 2020-03-02 DIAGNOSIS — E55.9 VITAMIN D DEFICIENCY: ICD-10-CM

## 2020-03-02 DIAGNOSIS — R25.2 MUSCLE CRAMPS: ICD-10-CM

## 2020-03-02 DIAGNOSIS — R10.13 ABDOMINAL PAIN, EPIGASTRIC: ICD-10-CM

## 2020-03-02 DIAGNOSIS — E78.5 HYPERLIPIDEMIA LDL GOAL <130: ICD-10-CM

## 2020-03-02 DIAGNOSIS — K21.9 GASTROESOPHAGEAL REFLUX DISEASE, ESOPHAGITIS PRESENCE NOT SPECIFIED: ICD-10-CM

## 2020-03-02 DIAGNOSIS — Z80.42 FAMILY HISTORY OF PROSTATE CANCER IN FATHER: ICD-10-CM

## 2020-03-02 DIAGNOSIS — Z13.6 CARDIOVASCULAR SCREENING; LDL GOAL LESS THAN 130: ICD-10-CM

## 2020-03-02 DIAGNOSIS — I10 HYPERTENSION GOAL BP (BLOOD PRESSURE) < 140/90: ICD-10-CM

## 2020-03-02 DIAGNOSIS — E78.6 HDL DEFICIENCY: ICD-10-CM

## 2020-03-02 DIAGNOSIS — R60.0 PEDAL EDEMA: ICD-10-CM

## 2020-03-02 DIAGNOSIS — Z00.00 ROUTINE GENERAL MEDICAL EXAMINATION AT A HEALTH CARE FACILITY: Primary | ICD-10-CM

## 2020-03-02 LAB
ALBUMIN SERPL-MCNC: 4 G/DL (ref 3.4–5)
ALP SERPL-CCNC: 75 U/L (ref 40–150)
ALT SERPL W P-5'-P-CCNC: 36 U/L (ref 0–70)
ANION GAP SERPL CALCULATED.3IONS-SCNC: 3 MMOL/L (ref 3–14)
AST SERPL W P-5'-P-CCNC: 20 U/L (ref 0–45)
BILIRUB SERPL-MCNC: 0.3 MG/DL (ref 0.2–1.3)
BUN SERPL-MCNC: 16 MG/DL (ref 7–30)
CALCIUM SERPL-MCNC: 8.7 MG/DL (ref 8.5–10.1)
CHLORIDE SERPL-SCNC: 110 MMOL/L (ref 94–109)
CHOLEST SERPL-MCNC: 159 MG/DL
CO2 SERPL-SCNC: 29 MMOL/L (ref 20–32)
CREAT SERPL-MCNC: 0.74 MG/DL (ref 0.66–1.25)
GFR SERPL CREATININE-BSD FRML MDRD: >90 ML/MIN/{1.73_M2}
GLUCOSE SERPL-MCNC: 106 MG/DL (ref 70–99)
HDLC SERPL-MCNC: 31 MG/DL
LDLC SERPL CALC-MCNC: 80 MG/DL
MAGNESIUM SERPL-MCNC: 2.4 MG/DL (ref 1.6–2.3)
NONHDLC SERPL-MCNC: 128 MG/DL
POTASSIUM SERPL-SCNC: 4 MMOL/L (ref 3.4–5.3)
PROT SERPL-MCNC: 7.7 G/DL (ref 6.8–8.8)
PSA SERPL-ACNC: 0.78 UG/L (ref 0–4)
SODIUM SERPL-SCNC: 142 MMOL/L (ref 133–144)
TRIGL SERPL-MCNC: 241 MG/DL

## 2020-03-02 PROCEDURE — 80061 LIPID PANEL: CPT | Performed by: FAMILY MEDICINE

## 2020-03-02 PROCEDURE — 36415 COLL VENOUS BLD VENIPUNCTURE: CPT | Performed by: FAMILY MEDICINE

## 2020-03-02 PROCEDURE — G0103 PSA SCREENING: HCPCS | Performed by: FAMILY MEDICINE

## 2020-03-02 PROCEDURE — 80053 COMPREHEN METABOLIC PANEL: CPT | Performed by: FAMILY MEDICINE

## 2020-03-02 PROCEDURE — 83735 ASSAY OF MAGNESIUM: CPT | Performed by: FAMILY MEDICINE

## 2020-03-02 PROCEDURE — 82306 VITAMIN D 25 HYDROXY: CPT | Performed by: FAMILY MEDICINE

## 2020-03-02 PROCEDURE — 99396 PREV VISIT EST AGE 40-64: CPT | Performed by: FAMILY MEDICINE

## 2020-03-02 RX ORDER — OMEPRAZOLE 40 MG/1
CAPSULE, DELAYED RELEASE ORAL
Qty: 90 CAPSULE | Refills: 3 | Status: SHIPPED | OUTPATIENT
Start: 2020-03-02 | End: 2021-05-10

## 2020-03-02 RX ORDER — DOXAZOSIN 8 MG/1
TABLET ORAL
Qty: 90 TABLET | Refills: 3 | Status: SHIPPED | OUTPATIENT
Start: 2020-03-02 | End: 2021-05-10

## 2020-03-02 ASSESSMENT — MIFFLIN-ST. JEOR: SCORE: 2015.6

## 2020-03-02 ASSESSMENT — ENCOUNTER SYMPTOMS
CONSTIPATION: 1
FREQUENCY: 1
HEMATURIA: 0
ABDOMINAL PAIN: 0
COUGH: 0
HEMATOCHEZIA: 0
DIZZINESS: 1
FEVER: 0
NERVOUS/ANXIOUS: 0
EYE PAIN: 0
CHILLS: 0
DIARRHEA: 0

## 2020-03-02 NOTE — PROGRESS NOTES
SUBJECTIVE:   CC: Loy Worthington is an 55 year old male who presents for preventative health visit.     Healthy Habits:     Getting at least 3 servings of Calcium per day:  Yes    Bi-annual eye exam:  Yes    Dental care twice a year:  Yes    Sleep apnea or symptoms of sleep apnea:  None    Diet:  Regular (no restrictions)    Frequency of exercise:  None    Taking medications regularly:  Yes    Medication side effects:  Muscle aches    PHQ-2 Total Score: 0    Additional concerns today:  No    He thinks the diuretic is causing muscle spasms   Cramps and tight muscles in the thighs and the calves  Minutes in the thighs hours in the from of his shins.     Alcohol or a herbal mixture massaged onto the affected muscles helps.               Today's PHQ-2 Score:   PHQ-2 ( 1999 Pfizer) 3/2/2020   Q1: Little interest or pleasure in doing things 0   Q2: Feeling down, depressed or hopeless 0   PHQ-2 Score 0   Q1: Little interest or pleasure in doing things Not at all   Q2: Feeling down, depressed or hopeless Not at all   PHQ-2 Score 0       Abuse: Current or Past(Physical, Sexual or Emotional)- No  Do you feel safe in your environment? YES        Social History     Tobacco Use     Smoking status: Never Smoker     Smokeless tobacco: Never Used     Tobacco comment: Lives in smoke free household   Substance Use Topics     Alcohol use: No         Alcohol Use 3/2/2020   Prescreen: >3 drinks/day or >7 drinks/week? No   Prescreen: >3 drinks/day or >7 drinks/week? -       Last PSA:   PSA   Date Value Ref Range Status   01/30/2018 1.86 0 - 4 ug/L Final     Comment:     Assay Method:  Chemiluminescence using Siemens Vista analyzer       Reviewed orders with patient. Reviewed health maintenance and updated orders accordingly -       Reviewed and updated as needed this visit by clinical staff  Tobacco  Allergies  Med Hx  Surg Hx  Fam Hx  Soc Hx        Reviewed and updated as needed this visit by Provider  Tobacco            Review of  "Systems   Constitutional: Negative for chills and fever.   HENT: Positive for ear pain. Negative for congestion.    Eyes: Negative for pain.   Respiratory: Negative for cough.    Cardiovascular: Negative for chest pain.   Gastrointestinal: Positive for constipation. Negative for abdominal pain, diarrhea and hematochezia.   Genitourinary: Positive for frequency. Negative for hematuria.   Neurological: Positive for dizziness.   Psychiatric/Behavioral: The patient is not nervous/anxious.          OBJECTIVE:   BP (!) 141/96   Pulse 78   Temp 98.6  F (37  C) (Oral)   Ht 1.778 m (5' 10\")   Wt 117.9 kg (260 lb)   BMI 37.31 kg/m      Physical Exam      Diagnostic Test Results:  Labs reviewed in Epic    ASSESSMENT/PLAN:       ICD-10-CM    1. Routine general medical examination at a health care facility Z00.00 Comprehensive metabolic panel   2. Essential hypertension with goal blood pressure less than 140/90 I10 Comprehensive metabolic panel     doxazosin (CARDURA) 8 MG tablet     omeprazole (PRILOSEC) 40 MG DR capsule   3. Pedal edema R60.0 Comprehensive metabolic panel     doxazosin (CARDURA) 8 MG tablet     omeprazole (PRILOSEC) 40 MG DR capsule   4. Abdominal pain, epigastric R10.13 Comprehensive metabolic panel     omeprazole (PRILOSEC) 40 MG DR capsule   5. Vitamin D deficiency E55.9 25 Hydroxyvitamin D2 and D3   6. Hypertension goal BP (blood pressure) < 140/90 I10 Comprehensive metabolic panel   7. High triglycerides E78.1 Comprehensive metabolic panel     Lipid panel reflex to direct LDL Fasting   8. Hyperlipidemia LDL goal <130 E78.5 Comprehensive metabolic panel     Lipid panel reflex to direct LDL Fasting   9. HDL deficiency E78.6 Comprehensive metabolic panel     Lipid panel reflex to direct LDL Fasting   10. Gastroesophageal reflux disease, esophagitis presence not specified K21.9 Comprehensive metabolic panel   11. CARDIOVASCULAR SCREENING; LDL GOAL LESS THAN 130 Z13.6 Comprehensive metabolic panel     " "Lipid panel reflex to direct LDL Fasting   12. Family history of prostate cancer in father Z80.42 Prostate spec antigen screen   13. Muscle cramps R25.2 Comprehensive metabolic panel     Magnesium       COUNSELING:   Reviewed preventive health counseling, as reflected in patient instructions       Regular exercise       Vision screening       Aspirin Prophylaxsis       Family planning       Consider Hep C screening for patients born between 1945 and 1965       Colon cancer screening       Osteoporosis Prevention/Bone Health    Estimated body mass index is 37.31 kg/m  as calculated from the following:    Height as of this encounter: 1.778 m (5' 10\").    Weight as of this encounter: 117.9 kg (260 lb).     Weight management plan: Discussed healthy diet and exercise guidelines     reports that he has never smoked. He has never used smokeless tobacco.      Counseling Resources:  ATP IV Guidelines  Pooled Cohorts Equation Calculator  FRAX Risk Assessment  ICSI Preventive Guidelines  Dietary Guidelines for Americans, 2010  [employer]'s MyPlate  ASA Prophylaxis  Lung CA Screening    Hussein Sanches MD  [facility]  --------------------------------------------------------------------------------------------------------------------------------------  SUBJECTIVE:  Loy Worthington is a 55 year old male who presents to the clinic today for a routine physical exam.    The patient's last physical was 2-5-19    Cholesterol   Date Value Ref Range Status   01/28/2019 168 <200 mg/dL Final   05/31/2018 144 <200 mg/dL Final     HDL Cholesterol   Date Value Ref Range Status   01/28/2019 34 (L) >39 mg/dL Final   05/31/2018 31 (L) >39 mg/dL Final     LDL Cholesterol Calculated   Date Value Ref Range Status   01/28/2019 96 <100 mg/dL Final     Comment:     Desirable:       <100 mg/dl   05/31/2018 76 <100 mg/dL Final     Comment:     Desirable:       <100 mg/dl     Triglycerides   Date Value Ref Range Status   01/28/2019 188 (H) <150 " mg/dL Final     Comment:     Borderline high:  150-199 mg/dl  High:             200-499 mg/dl  Very high:       >499 mg/dl  Fasting specimen     05/31/2018 184 (H) <150 mg/dL Final     Comment:     Borderline high:  150-199 mg/dl  High:             200-499 mg/dl  Very high:       >499 mg/dl  Fasting specimen       Cholesterol/HDL Ratio   Date Value Ref Range Status   04/08/2015 5.3 (H) 0.0 - 5.0 Final   09/18/2014 5.4 (H) 0.0 - 5.0 Final     The patient's last fasting lipid panel was done 1 years ago and the results are listed above.      The 10-year ASCVD risk score (Rafael TALLEY Jr., et al., 2013) is: 9.6%    Values used to calculate the score:      Age: 55 years      Sex: Male      Is Non- : No      Diabetic: No      Tobacco smoker: No      Systolic Blood Pressure: 141 mmHg      Is BP treated: Yes      HDL Cholesterol: 34 mg/dL      Total Cholesterol: 168 mg/dL    Risk Enhancers:  Family history of premature ASCVD  LDL >159  Chronic kidney disease   Metabolic Syndrome  Inflammatory conditions (RA, psoriasis, HIV)  SE  Ancestry  Triglycerides >174      The patient reports that he has been treated for high blood pressure. He has been checking his blood pressure at home and getting 130's/80's frequently   He reports that after getting out of a jacuzzi/hottub he felt very lightheaded and his blood pressure was low.   He thinks the diuretic is causing muscle spasms   Cramps and tight muscles in the thighs and the calves  Minutes in the thighs hours in the from of his shins.     Alcohol or a herbal mixture massaged onto the affected muscles helps.             The patient reports that he does not take a daily aspirin. He does take a blood thinner prescribed by his cardiologist.     Lab Results   Component Value Date    HCVAB Negative 03/18/2014     The patient reports that he has been screened for Hepatitis C    (Screen all baby boomers once per CDC-- the generation born from 1945 through 1965  and per USPTF screen age 19 to 79 especially younger people who have used IV drugs)  He would not like to have an Hepatitis C test today    Lab Results   Component Value Date    HIAGAB Nonreactive 01/28/2019     The patient reports that he has not been screened for HIV   (Screen all 15 to 64 years old)  He would not like to have an HIV test today      Immunization History   Administered Date(s) Administered     HepB 10/15/2001, 11/15/2001, 11/28/2012     Influenza (IIV3) PF 11/10/2008     Mantoux Tuberculin Skin Test 05/15/1986, 10/15/2001     TD (ADULT, 7+) 09/25/2001, 11/15/2001     TDAP Vaccine (Adacel) 09/10/2012     The patient's believes that his last tetanus shot was given 8 year(s) ago.   The patient believes that he has not had a Shingrix in the past  The patient believes that he has not had a PPSV23 in the past.  The patient believes that he has not had a PCV13 in the past.  The patient believes that he has not had a seasonal flu vaccination this fall or winter.  The patient would like to have a no vaccinations today      No results found for this or any previous visit.]   The patient denies a family history of colon cancer.  The patient reports that he has had a colonoscopy. His  last colonoscopy was in 2018 and he  report that is was normal. The patient was told to have this repeated in 5 years.    The patient reports that he and his wife or partner are not using contraception as she has gone through menopause.    The patient denies a family history of diabetes.  The patient reports a family history of prostate cancer in his father. After discussing the pros and cons of checking a PSA he  Does want to have this test drawn today.   The patient reports that he eats or drinks 3 servings of dairy products per day.   The patient reports that he has dental appointments approximately every 6 months.  The patient reports that he  has an eye examination approximately every 2 year(s).    Do you currently smoke?  No  How many years have you smoked? 0   How many packs per day did you smoke on average? N/A  (if more than 30 pack year history and the patient is age 55-80 consider ordering an annual low dose radiation lung CT to screen for cancer)  (Do not order if patient has quit more than 15 years ago or has a health condition that limits life expectancy or could not tolerate curative lung surgery)  Are you interested having a lung CT to screen for lung cancer? N/A    If the patient has smoked more that 100 cigarettes, has the patient had an imaging study (US or CT) for an AAA between the ages of 65 and 75? N/A            Patient Active Problem List   Diagnosis     Brain injury (H)     Renal mass     Migraine headache     CARDIOVASCULAR SCREENING; LDL GOAL LESS THAN 130     Hypertension goal BP (blood pressure) < 140/90     Bilateral corneal scars     High triglycerides     Familial hypoalphalipoproteinemia     Vitamin D deficiency     Hyperlipidemia LDL goal <130     Vitamin D deficiency disease     GERD (gastroesophageal reflux disease)     Tubular adenoma of colon     Paroxysmal atrial fibrillation (H)     Hematospermia     Lung nodule, solitary, Needs chest CT in Jan 2017     S/P laparoscopic cholecystectomy     Pulmonary nodule, right lung base needs fu scan     HDL deficiency     Diverticulosis of large intestine     Obesity (BMI 35.0-39.9) with comorbidity (H)     Post concussion syndrome       Past Surgical History:   Procedure Laterality Date     CHOLECYSTECTOMY       ENDOSCOPY  12/12/2005    upper GI endoscopy     ESOPHAGOSCOPY, GASTROSCOPY, DUODENOSCOPY (EGD), COMBINED N/A 1/25/2016    Procedure: COMBINED ESOPHAGOSCOPY, GASTROSCOPY, DUODENOSCOPY (EGD);  Surgeon: David Campos MD;  Location:  OR     ESOPHAGOSCOPY, GASTROSCOPY, DUODENOSCOPY (EGD), COMBINED N/A 1/25/2016    Procedure: COMBINED ESOPHAGOSCOPY, GASTROSCOPY, DUODENOSCOPY (EGD), BIOPSY SINGLE OR MULTIPLE;  Surgeon: David Campos MD;   Location: [facility] OR     H ABLATION ATRIAL FLUTTER  May 2016    Dr Cardenas at [facility]     NASAL/SINUS POLYPECTOMY      Nasal-Sinus Polypectomy     SURGICAL HISTORY OF -       cale holes, subdural due to fall     SURGICAL HISTORY OF -       ?tympanoplasty/mastoid with brain trauma       Family History   Problem Relation Age of Onset     Hypertension Father      Hypertension Brother      Macular Degeneration No family hx of      Glaucoma No family hx of      Thyroid Disease No family hx of      Cancer No family hx of      Diabetes No family hx of      Cerebrovascular Disease No family hx of        Social History     Socioeconomic History     Marital status:      Spouse name: Not on file     Number of children: Not on file     Years of education: Not on file     Highest education level: Not on file   Occupational History     Not on file   Social Needs     Financial resource strain: Not on file     Food insecurity:     Worry: Not on file     Inability: Not on file     Transportation needs:     Medical: Not on file     Non-medical: Not on file   Tobacco Use     Smoking status: Never Smoker     Smokeless tobacco: Never Used     Tobacco comment: Lives in smoke free household   Substance and Sexual Activity     Alcohol use: No     Drug use: No     Sexual activity: Yes     Partners: Female   Lifestyle     Physical activity:     Days per week: Not on file     Minutes per session: Not on file     Stress: Not on file   Relationships     Social connections:     Talks on phone: Not on file     Gets together: Not on file     Attends Restorationism service: Not on file     Active member of club or organization: Not on file     Attends meetings of clubs or organizations: Not on file     Relationship status: Not on file     Intimate partner violence:     Fear of current or ex partner: Not on file     Emotionally abused: Not on file     Physically abused: Not on file     Forced sexual activity: Not on file   Other Topics Concern      "Parent/sibling w/ CABG, MI or angioplasty before 65F 55M? Not Asked   Social History Narrative     Not on file       Current Outpatient Medications   Medication Sig Dispense Refill     carvedilol (COREG) 25 MG tablet Take 1 tablet (25 mg) by mouth 2 times daily 180 tablet 3     Cholecalciferol (VITAMIN D3) 2000 units CAPS TAKE 1 CAPSULE BY MOUTH EVERY DAY 90 capsule 3     doxazosin (CARDURA) 8 MG tablet TAKE 1 TABLET BY MOUTH EVERY NIGHT AT BEDTIME 90 tablet 0     furosemide (LASIX) 40 MG tablet TAKE 1 TABLET(40 MG) BY MOUTH EVERY MORNING 90 tablet 3     lisinopril (PRINIVIL/ZESTRIL) 40 MG tablet Take 1 tablet (40 mg) by mouth daily 90 tablet 3     omeprazole (PRILOSEC) 40 MG DR capsule TAKE 1 CAPSULE(40 MG) BY MOUTH DAILY 30 TO 60 MINUTES BEFORE A MEAL 90 capsule 0     order for DME Equipment being ordered: RIGHT knee \"\" style brace to correct varus deformity.    Diagnosis: ICD-10: M17.11 Osteoarthritis of right knee, unspecified osteoarthritis type     Length of need : 99mo 1 each 0     rivaroxaban ANTICOAGULANT (XARELTO) 20 MG TABS tablet Take 1 tablet (20 mg) by mouth daily (with dinner) 90 tablet 1     spironolactone (ALDACTONE) 25 MG tablet Take 0.5 tablets (12.5 mg) by mouth daily 90 tablet 3     SUMAtriptan (IMITREX) 50 MG tablet TAKE 1 TABLET BY MOUTH AT ONSET OF HEADACHE AND MAY REPEAT IN 2 HOURS IF NEEDED, MAX 2 TABLETS DAILY 27 tablet 3       Review Of Systems  Genitourinary: negative, nocturia x 1-3, and difficulty initiating urination      PHYSICAL EXAMINATION:  Blood pressure (!) 141/96, pulse 78, temperature 98.6  F (37  C), temperature source Oral, height 1.778 m (5' 10\"), weight 117.9 kg (260 lb).  General appearance - healthy, alert and no distress  Skin - Skin color, texture, turgor normal. No rashes or lesions.  Head - Normocephalic. No masses, lesions, tenderness or abnormalities  Eyes - conjunctivae/corneas clear. PERRL, EOM's intact. Fundi benign  Ears - External ears normal. Canals " clear. TM's normal.  Nose/Sinuses - Nares normal. Septum midline. Mucosa normal. No drainage or sinus tenderness.  Oropharynx - Lips, mucosa, and tongue normal. Teeth and gums normal.  Neck - Neck supple. No adenopathy. Thyroid symmetric, normal size,  Lungs - Percussion normal. Good diaphragmatic excursion. Lungs clear  Heart - PMI normal. No lifts, heaves, or thrills. RRR. No murmurs, clicks gallops or rub  Abdomen - Abdomen soft, non-tender. BS normal. No masses, organomegaly  Extremities - Extremities normal. No deformities, edema, or skin discoloration.  Musculoskeletal - Spine ROM normal. Muscular strength intact.  Peripheral pulses - radial=4/4, femoral=4/4, popliteal=4/4, dorsalis pedis=4/4,  Neuro - Gait normal. Reflexes normal and symmetric. Sensation grossly WNL.  Genitalia - Penis normal. No urethral discharge. Scrotum normal to palpation. No hernia.  Rectal - Rectal negative. Prostate palpation negative.  No rectal masses or abnormalities  EXTREMITIES: +1 edema above the tops of his socks.     Transferred Records on 11/01/2019   Component Date Value Ref Range Status     Ejection Fraction 11/21/2014 60-65  % Final       ASSESSMENT:    ICD-10-CM    1. Routine general medical examination at a health care facility Z00.00        Well-Adult Physical Exam.  Health Maintenance Due   Topic Date Due     ZOSTER IMMUNIZATION (1 of 2) 02/17/2014     INFLUENZA VACCINE (1) 09/01/2019     PHQ-2  01/01/2020     PREVENTIVE CARE VISIT  02/05/2020     Health Maintenance   Topic Date Due     ZOSTER IMMUNIZATION (1 of 2) 02/17/2014     INFLUENZA VACCINE (1) 09/01/2019     PHQ-2  01/01/2020     PREVENTIVE CARE VISIT  02/05/2020     DTAP/TDAP/TD IMMUNIZATION (4 - Td) 09/10/2022     COLONOSCOPY  09/14/2023     LIPID  01/28/2024     ADVANCE CARE PLANNING  02/20/2024     HEPATITIS C SCREENING  Completed     HIV SCREENING  Completed     MIGRAINE ACTION PLAN  Completed     IPV IMMUNIZATION  Aged Out     MENINGITIS IMMUNIZATION   Aged Out         HEALTH CARE MAINTENENCE: The recommended screening tests and vaccinatons for this patient have been discussed as above.  The appropriate tests and vaccinations  have been ordered or declined by the patient. Please see the orders in EPIC.The patient specifically declines: Shingrix    Immunization Status:  up to date and documented except for Shingrix and influenza     Patient Active Problem List   Diagnosis     Brain injury (H)     Renal mass     Migraine headache     CARDIOVASCULAR SCREENING; LDL GOAL LESS THAN 130     Hypertension goal BP (blood pressure) < 140/90     Bilateral corneal scars     High triglycerides     Familial hypoalphalipoproteinemia     Vitamin D deficiency     Hyperlipidemia LDL goal <130     Vitamin D deficiency disease     GERD (gastroesophageal reflux disease)     Tubular adenoma of colon     Paroxysmal atrial fibrillation (H)     Hematospermia     Lung nodule, solitary, Needs chest CT in Jan 2017     S/P laparoscopic cholecystectomy     Pulmonary nodule, right lung base needs fu scan     HDL deficiency     Diverticulosis of large intestine     Obesity (BMI 35.0-39.9) with comorbidity (H)     Post concussion syndrome        ATP III Guidelines  ICSI Preventive Guidelines    PLAN:   The patient will make a future lab appointment for all bloodwork as they are not fasting today  Check a fasting lipid profile  Shingrix recommended  Testicular self-exam encouraged  Check a fasting glucose  Check a PSA  Discussed calcium intake, vitamins and supplements. Recommended 1000 mg of calcium daily  Weight loss through diet and exercise was recommended  Sunscreen use was recommended especially in the area of tatoos  Refills on chronic medication given  Recommended dental exams every 6 months  Recommended eye exam every 1-2 years  Follow up in 1 year for the next preventative medical visit        Body mass index is 37.31 kg/m .      Depending on his blood test results we will change his  blood pressure medication and try to change his diuretic which is likely the cause of his muscle cramps.   He asked that I call Adriana with the results and the plan: [phone]    A urology consult to evaluate his urination symptom(s) may be indicated.

## 2020-03-03 ENCOUNTER — TELEPHONE (OUTPATIENT)
Dept: FAMILY MEDICINE | Facility: CLINIC | Age: 55
End: 2020-03-03

## 2020-03-03 DIAGNOSIS — R39.11 URINARY HESITANCY: Primary | ICD-10-CM

## 2020-03-03 LAB
DEPRECATED CALCIDIOL+CALCIFEROL SERPL-MC: <34 UG/L (ref 20–75)
VITAMIN D2 SERPL-MCNC: <5 UG/L
VITAMIN D3 SERPL-MCNC: 29 UG/L

## 2020-03-03 NOTE — TELEPHONE ENCOUNTER
The patient only speaks Egyptian. He has asked that we please call Adriana his  to relay his laboratory results.    He asked that I call Adriana with the results and the plan: [phone]    Please let him know that the PSA (screening test for prostate cancer), Vitamin D level, kidney and liver function tests  were normal     His glucose level was in the prediabetic level. Weight loss and exercise will help prevent developing diabetes down the line.     His triglycerides were too high.  Triglycerides are a measure of fat in your blood and high levels can put you at risk. The maximum acceptable value is 150.  His  HDL (good) cholesterol was too low.  A low HDL can put you at risk. The lowest acceptable level is generally considered to be 40 and some authorities recommend it be 50 or higher for women.  Therapeutic life style changes are usually recommended as the first treatment to help improve your cholesterol. My recommendations at this time include:  -Eating a lower fat and lower cholesterol diet.  -Eating less sweets with simple sugars like candy or baked goods.  -Regular aerobic exercise such as walking, running, biking, roller blading or swimming.  I would like you to follow the above recommendations as best as you can and have a fasting cholesterol test in our lab in 3-4 months. One week after that I want to see you in the clinic for an appointment to go over the results.        I am going to also recommend(ed) that he have a urology appointment(s) to further evaluate his urination difficulty. He should call specialty scheduling at [phone] to schedule that appointment.     I do not have an explanation for his muscle cramps right now. We may have to adjust his diuretic medication. Please have him see the urologist first and figure out why he has difficulty  passing his urine and then follow up in clinic with me for a recheck on that and for a blood pressure medication evaluation.   Hussein Sanches MD

## 2020-03-03 NOTE — TELEPHONE ENCOUNTER
Pt notified via phone of results and plan as written in provider note below with assistance of Finnish  Adriana via conference call. Pt indicates understanding of issues and agrees with the plan.    RN discussed benefit of exercise and avoidance of soda and other sugary beverages when trying to improve blood sugar levels. Pt states he does not drink soda or sugary beverages. Pt is working on wt loss and exercise. Pt states due to knee pain, he finds most wt bearing exercises painful and feels swimming will be most comfortable for him.    RN located Pt Education materials within Clinical References regarding Controlling Cholesterol written in Finnish.  :   1. Please mail letter containing content found in Patient Instructions section written in Finnish. RN confirmed pt mailing address on file.  2. Please review future lab appointments and appointments with Dr. Sanches. Does anything else need to be done to get him scheduled with  Adriana at the visits?    Adriana will assist pt with scheduling the Urology appointment as discussed. Pt indicates understanding of all issues and agrees with the plan as documented in provider note below.    SANTANA LemusN, RN

## 2020-03-03 NOTE — LETTER
[facility]  [address] [facility] [address]  Phone: [phone]    03/04/20    Loy Worthington  [address] [place] [address]      Dear Loy,     Please let him know that the PSA (screening test for prostate cancer), Vitamin D level, kidney and liver function tests  were normal      His glucose level was in the prediabetic level. Weight loss and exercise will help prevent developing diabetes down the line.      His triglycerides were too high.  Triglycerides are a measure of fat in your blood and high levels can put you at risk. The maximum acceptable value is 150.  His  HDL (good) cholesterol was too low.  A low HDL can put you at risk. The lowest acceptable level is generally considered to be 40 and some authorities recommend it be 50 or higher for women.  Therapeutic life style changes are usually recommended as the first treatment to help improve your cholesterol. My recommendations at this time include:  -Eating a lower fat and lower cholesterol diet.  -Eating less sweets with simple sugars like candy or baked goods.  -Regular aerobic exercise such as walking, running, biking, roller blading or swimming.  I would like you to follow the above recommendations as best as you can and have a fasting cholesterol test in our lab in 3-4 months. One week after that I want to see you in the clinic for an appointment to go over the results.    I am going to also recommend(ed) that he have a urology appointment(s) to further evaluate his urination difficulty. He should call specialty scheduling at [phone] to schedule that appointment.      I do not have an explanation for his muscle cramps right now. We may have to adjust his diuretic medication. Please have him see the urologist first and figure out why he has difficulty  passing his urine and then follow up in clinic with me for a recheck on that and for a blood pressure medication evaluation.     Sincerely,  Hussein DENNISON  Myron, MD/klf      Patient Education     ???????? ?????? ??????????? (Controlling Your Cholesterol)   ?????????? (Cholesterol) - ????????????? ????????, ??????????? ??????. ??? ??????? ?????? ?????????? ?????????? ??????? ?? ??????? ??????????? ???????. ? ?????????? ????? ??????? ??????????? ??????? ??????????, ??? ???????? ? ????????? ??????????????. ???????????? ??? ????? ???????? ? ?????????? ???????? ??? ????????.    ???????  ?  ??????  ??????????   ?????? - ??? ????, ? ????? ??????? ?????? ??????? ?? ????. ??? ? ???? ?? ???????????. ?? ???? ??????? ??? ???????? ??????? ?????????? ???????????? (??????,  ???????????  ? ??????????? ????????). ??????????? ???????? ????????? ?????????????, ??????? ? ????  ????  ???????, ??????? ? ????????. ?????????? ??? ???????? ???? ?????????????:   ??????????? ?????? ????????? (???) (LDL, low-density lipoprotein), ??????? ???????? ???  ??????  ??????????. ???  ????  - ??????? ?????? ??????????, ??????? ?? ?????????? ? ???????????? ???????. ???? ??? ??????? ?????, ?? ????? ????????????? ?? ??????? ???????. ? ?????????? ????????????? ???? ????????? ??????????? ? ????????.   ??????????? ??????? ????????? (???) (HDL, high-density lipoprotein), ??????? ???????? ???  ???????  ??????????. ?? ??????? ?????? ??????? ?? ??????????? ????????. ????? ??????????? ???????? ?????????? ??????????, ??????????? ??? ?? ??????? ??????????? ???????. ??? ?????? ??????? ??????? ??????????? ??? ????? ????????? ???? ????????? ??????????? ? ????????.  ???????? ?????? ???????????   ????? ??????? ??????????? (Total cholesterol) ???????? ? ???? ?????????? ???, ?????????? ??? ? ?????? ???? ? ?????????. ???? ????? ??????? ??????????? ? ??? ???????, ???????? ??????????????? ?????????????, ??????? ??????? ??? ??????? ????? ??????? ??????????? ? ?????.   ????? ?????? ?????????? ?????:   ????????? ???????????? ?????????? ????? (saturated fats) ? ?????????? (trans) (?? ??? ???????? ????????????????????  (hydrogenated)). ????? ???????? ?????? ???????, ?????????? ???????? ????????, ???????????? ???????????? ????? ?????? ???????? ?????. ?????????? ??????????? ????????????? ???????, ?????? ??????????????, ??????? ????. ?????? ??????? ? ??????? ??????????? ???? ????? ??????????? ???????  ???????  ???????????. ???????????? ?????? ?????????? ???????????? ?????????, ?????????? ??????????.   ???????? ????? 2 ?????? ???? ? ??????. ?????? ???? ??? ???????? ?????? ??????? ?????-3. ??? ???????????? ???????? ?????? ??????????? ? ?????.   ????? ?????? ?????????????? ???? ? ??????????? ????????? (????????, ??????? ??????). ??? ??????? ????? ??????? ???????????.  ?????? ???????? ????? ?????:   ???????? ??? ??? ???????? ????????????, ??????? ??? ????????. ????? ????????, ????????, ???? ?? ?????????? - ??? ????????? ???????? ???? ????????.   ??????? ? ?????? ????????, ??????????? ??? ???. ?????? ?????? ???????????? ????????????????? ? ????.   ???????? ????????????????? ?????????? ? ????????? ??? ??????? ????????? ?? 40 ?????; ??????????? ??? ??????? 3-4 ???? ? ??????.   ???????, ??? ????????? ???????? ?????, ??? ?????? ???????.   ???? ?? ?? ?????????? ??????????? ???????????? ?????????, ????????? ??????????. ??????????????????? ? ??????, ????? ?????????, ??? ???? ????????? ?????????? ?????????? ??? ????????.  ??????? ??????. ????????? ??????, ?? ?????? ???????? ??????? ???????. ? ?????????? ????????? ???? ????????? ??????????? ? ????????.   ???????? ????. ???? ? ??? ???? ?????? ??? ??? ?? ????????? ?????????, ???? ???????? ?????????? ????? ???????? ? ???? ??? ????????? ???? ? ??????? ????? ???? (BMI) ?? ??????????? ??? ???????? ? ??????????? ??????. ???????? ????????? ? ??????? ? ????????? ?????????? ?????????? ????? ???? ??????? ? ???? ????.   ?????????? ????????? ? ???????????? ? ?????????? ?????. ?????? ????? ??? ???????? ?????? ??? ?? ??????????? ?????????? ????????? ?????????. ????????? - ??? ??????????? ? ?????????? ????????  ???????? ?????? ???????????. (?????? ????? ???????? ?? ???????? ?????????? ?????????? ? ?????????? ???????). ??? ???? ?????? ???????, ????? ?? ??? ??????? ????????? ?????????, ????????? ??????? ???????????.  Date Last Reviewed: 6/1/2017 2000-2019 ??. [employer] [address], [place] [id number]. ??? ????? ????????. ?????? ?????????? ?? ???????? ??????? ???????????????? ??????????? ??????. ?????? ?????????? ???????? ?????? ?????.

## 2020-03-03 NOTE — PATIENT INSTRUCTIONS
Patient Education     ???????? ?????? ??????????? (Controlling Your Cholesterol)  ?????????? (Cholesterol) - ????????????? ????????, ??????????? ??????. ??? ??????? ?????? ?????????? ?????????? ??????? ?? ??????? ??????????? ???????. ? ?????????? ????? ??????? ??????????? ??????? ??????????, ??? ???????? ? ????????? ??????????????. ???????????? ??? ????? ???????? ? ?????????? ???????? ??? ????????.   ???????  ?  ??????  ??????????  ?????? - ??? ????, ? ????? ??????? ?????? ??????? ?? ????. ??? ? ???? ?? ???????????. ?? ???? ??????? ??? ???????? ??????? ?????????? ???????????? (??????,  ???????????  ? ??????????? ????????). ??????????? ???????? ????????? ?????????????, ??????? ? ????  ????  ???????, ??????? ? ????????. ?????????? ??? ???????? ???? ?????????????:    ??????????? ?????? ????????? (???) (LDL, low-density lipoprotein), ??????? ???????? ???  ??????  ??????????. ???  ????  - ??????? ?????? ??????????, ??????? ?? ?????????? ? ???????????? ???????. ???? ??? ??????? ?????, ?? ????? ????????????? ?? ??????? ???????. ? ?????????? ????????????? ???? ????????? ??????????? ? ????????.    ??????????? ??????? ????????? (???) (HDL, high-density lipoprotein), ??????? ???????? ???  ???????  ??????????. ?? ??????? ?????? ??????? ?? ??????????? ????????. ????? ??????????? ???????? ?????????? ??????????, ??????????? ??? ?? ??????? ??????????? ???????. ??? ?????? ??????? ??????? ??????????? ??? ????? ????????? ???? ????????? ??????????? ? ????????.  ???????? ?????? ???????????  ????? ??????? ??????????? (Total cholesterol) ???????? ? ???? ?????????? ???, ?????????? ??? ? ?????? ???? ? ?????????. ???? ????? ??????? ??????????? ? ??? ???????, ???????? ??????????????? ?????????????, ??????? ??????? ??? ??????? ????? ??????? ??????????? ? ?????.    ????? ?????? ?????????? ?????:  ? ????????? ???????????? ?????????? ????? (saturated fats) ? ?????????? (trans) (?? ??? ???????? ???????????????????? (hydrogenated)). ?????  ???????? ?????? ???????, ?????????? ???????? ????????, ???????????? ???????????? ????? ?????? ???????? ?????. ?????????? ??????????? ????????????? ???????, ?????? ??????????????, ??????? ????. ?????? ??????? ? ??????? ??????????? ???? ????? ??????????? ???????  ???????  ???????????. ???????????? ?????? ?????????? ???????????? ?????????, ?????????? ??????????.  ? ???????? ????? 2 ?????? ???? ? ??????. ?????? ???? ??? ???????? ?????? ??????? ?????-3. ??? ???????????? ???????? ?????? ??????????? ? ?????.  ? ????? ?????? ?????????????? ???? ? ??????????? ????????? (????????, ??????? ??????). ??? ??????? ????? ??????? ???????????.    ?????? ???????? ????? ?????:  ? ???????? ??? ??? ???????? ????????????, ??????? ??? ????????. ????? ????????, ????????, ???? ?? ?????????? - ??? ????????? ???????? ???? ????????.  ? ??????? ? ?????? ????????, ??????????? ??? ???. ?????? ?????? ???????????? ????????????????? ? ????.  ? ???????? ????????????????? ?????????? ? ????????? ??? ??????? ????????? ?? 40 ?????; ??????????? ??? ??????? 3-4 ???? ? ??????.  ? ???????, ??? ????????? ???????? ?????, ??? ?????? ???????.  ? ???? ?? ?? ?????????? ??????????? ???????????? ?????????, ????????? ??????????. ??????????????????? ? ??????, ????? ?????????, ??? ???? ????????? ?????????? ?????????? ??? ????????.    ??????? ??????. ????????? ??????, ?? ?????? ???????? ??????? ???????. ? ?????????? ????????? ???? ????????? ??????????? ? ????????.    ???????? ????. ???? ? ??? ???? ?????? ??? ??? ?? ????????? ?????????, ???? ???????? ?????????? ????? ???????? ? ???? ??? ????????? ???? ? ??????? ????? ???? (BMI) ?? ??????????? ??? ???????? ? ??????????? ??????. ???????? ????????? ? ??????? ? ????????? ?????????? ?????????? ????? ???? ??????? ? ???? ????.    ?????????? ????????? ? ???????????? ? ?????????? ?????. ?????? ????? ??? ???????? ?????? ??? ?? ??????????? ?????????? ????????? ?????????. ????????? - ??? ??????????? ? ?????????? ???????? ????????  ?????? ???????????. (?????? ????? ???????? ?? ???????? ?????????? ?????????? ? ?????????? ???????). ??? ???? ?????? ???????, ????? ?? ??? ??????? ????????? ?????????, ????????? ??????? ???????????.  Date Last Reviewed: 6/1/2017 2000-2019 ??. [employer]. [address], [place] [address]. ??? ????? ????????. ?????? ?????????? ?? ???????? ??????? ???????????????? ??????????? ??????. ?????? ?????????? ???????? ?????? ?????.

## 2020-03-06 NOTE — TELEPHONE ENCOUNTER
Letter was emailed to the translation service for translation.  I will watch my emails for the translated letter to arrive and then I will mail it to the patient.  [place],

## 2020-03-06 NOTE — TELEPHONE ENCOUNTER
No repeat labs are needed right away. We will have to recheck his fasting lipid panel down the road in 3-6 month(s)/

## 2020-03-13 NOTE — TELEPHONE ENCOUNTER
The translated letter came in my email today from the translation service.  I mailed the translation letter and the Patient Education information to the patient.  [place],

## 2020-06-03 ENCOUNTER — TELEPHONE (OUTPATIENT)
Dept: FAMILY MEDICINE | Facility: CLINIC | Age: 55
End: 2020-06-03

## 2020-06-03 DIAGNOSIS — Z13.6 CARDIOVASCULAR SCREENING; LDL GOAL LESS THAN 130: ICD-10-CM

## 2020-06-03 DIAGNOSIS — E78.5 HYPERLIPIDEMIA LDL GOAL <130: Primary | ICD-10-CM

## 2020-06-03 DIAGNOSIS — E78.6 HDL DEFICIENCY: ICD-10-CM

## 2020-06-08 DIAGNOSIS — Z13.6 CARDIOVASCULAR SCREENING; LDL GOAL LESS THAN 130: ICD-10-CM

## 2020-06-08 DIAGNOSIS — E78.5 HYPERLIPIDEMIA LDL GOAL <130: ICD-10-CM

## 2020-06-08 DIAGNOSIS — E78.6 HDL DEFICIENCY: ICD-10-CM

## 2020-06-08 LAB
CHOLEST SERPL-MCNC: 172 MG/DL
HDLC SERPL-MCNC: 31 MG/DL
LDLC SERPL CALC-MCNC: 78 MG/DL
NONHDLC SERPL-MCNC: 141 MG/DL
TRIGL SERPL-MCNC: 313 MG/DL

## 2020-06-08 PROCEDURE — 80061 LIPID PANEL: CPT | Performed by: FAMILY MEDICINE

## 2020-06-08 PROCEDURE — 36415 COLL VENOUS BLD VENIPUNCTURE: CPT | Performed by: FAMILY MEDICINE

## 2020-06-08 NOTE — RESULT ENCOUNTER NOTE
I have reviewed the schedule of future appointments and this patient has an appointment scheduled for 6-.    Visit date not found

## 2020-06-22 ENCOUNTER — OFFICE VISIT (OUTPATIENT)
Dept: FAMILY MEDICINE | Facility: CLINIC | Age: 55
End: 2020-06-22
Payer: COMMERCIAL

## 2020-06-22 VITALS
HEART RATE: 82 BPM | WEIGHT: 266 LBS | BODY MASS INDEX: 38.17 KG/M2 | SYSTOLIC BLOOD PRESSURE: 134 MMHG | DIASTOLIC BLOOD PRESSURE: 80 MMHG | TEMPERATURE: 97.8 F | OXYGEN SATURATION: 98 %

## 2020-06-22 DIAGNOSIS — R60.0 PEDAL EDEMA: ICD-10-CM

## 2020-06-22 DIAGNOSIS — M79.10 MUSCLE PAIN: Primary | ICD-10-CM

## 2020-06-22 DIAGNOSIS — I10 ESSENTIAL HYPERTENSION WITH GOAL BLOOD PRESSURE LESS THAN 140/90: ICD-10-CM

## 2020-06-22 DIAGNOSIS — R10.13 ABDOMINAL PAIN, EPIGASTRIC: ICD-10-CM

## 2020-06-22 PROCEDURE — 99214 OFFICE O/P EST MOD 30 MIN: CPT | Performed by: FAMILY MEDICINE

## 2020-06-22 RX ORDER — FUROSEMIDE 40 MG
TABLET ORAL
Qty: 90 TABLET | Refills: 3 | Status: SHIPPED | OUTPATIENT
Start: 2020-06-22 | End: 2021-09-23

## 2020-06-22 RX ORDER — TRAMADOL HYDROCHLORIDE 50 MG/1
50 TABLET ORAL 3 TIMES DAILY PRN
Qty: 90 TABLET | Refills: 1 | Status: SHIPPED | OUTPATIENT
Start: 2020-06-22 | End: 2020-06-25

## 2020-06-22 RX ORDER — SPIRONOLACTONE 25 MG/1
12.5 TABLET ORAL DAILY
Qty: 45 TABLET | Refills: 3 | Status: SHIPPED | OUTPATIENT
Start: 2020-06-22 | End: 2020-12-10

## 2020-06-22 RX ORDER — LISINOPRIL 40 MG/1
40 TABLET ORAL DAILY
Qty: 90 TABLET | Refills: 3 | Status: SHIPPED | OUTPATIENT
Start: 2020-06-22 | End: 2021-08-11

## 2020-06-22 ASSESSMENT — PAIN SCALES - GENERAL: PAINLEVEL: NO PAIN (0)

## 2020-06-22 NOTE — PATIENT INSTRUCTIONS
Patient Education     Understanding Fat and Cholesterol  Too much cholesterol in your blood can lead to many problems such as blocked arteries. This can lead to heart attack and stroke. One of the best ways to manage heart and blood vessel disease is to lower your blood cholesterol. Planning meals that are low in saturated fat and cholesterol helps reduce the level of cholesterol in your blood. Below are eating tips to help lower your blood cholesterol levels.  Eat less fat  A healthy goal is to have less than 25% to 35% of your daily calories come from fat. Instead of fats, eat more fruits, whole-grains, and vegetables. This also helps control your weight, and can even reduce your risk for some cancers. There are different kinds of fats in foods. Fats can be saturated, unsaturated, or trans fats. The best fats to choose are unsaturated fats. But fats are high in calories, so eat even unsaturated fats sparingly.  Limit foods high in saturated fats  Saturated fats come from animals and certain plants (such as coconut and palm). Eating too much saturated fat can raise your blood cholesterol levels and make your artery problems worse. Your goal is to eat less saturated fat. Below are some examples of foods that contain lots of saturated fat:    Fatty cuts of meat (lamb, ham, beef)    Many pastries, cakes, cookies, and candies    Cream, ice cream, sour cream, cheese, and butter, and foods made with them    Sauces made with butter or cream    Salad dressings with saturated fats    Foods that contain palm or coconut oil  Choose unsaturated fats  Unsaturated fats are usually liquid at room temperature. They are better choices for your heart than saturated fat. There are two types of unsaturated fats: polyunsaturated fat and monounsaturated fat. Aim to replace saturated fats with polyunsaturated or monounsaturated fats.    Polyunsaturated fats are found in corn oil, safflower oil, sunflower oil, and other vegetable  oils.    Monounsaturated fats are found in olive oil, canola oil, and peanut oil. Some margarines and spreads are now made with these oils, too. Avocados are also high in monounsaturated fat.  Of all fats, monounsaturated fats are the least harmful to your heart.  Avoid trans fats  Like saturated fats, trans fats have been linked to heart disease. Even a small amount can harm your health. Trans fats are found in liquid oils that have been changed to be solid at room temperature. Margarine, which is often made from vegetable oil, is one example. Vegetable shortening is another. Trans fats are often found in packaged goods. Check ingredients for the words  hydrogenated  or  partially hydrogenated.  They mean the foods contain trans fat.  What about triglycerides?  Triglycerides are a type of fat in your blood. Like cholesterol, high levels of triglycerides can lead to blocked arteries. High triglyceride levels can be reduced 20% to 50%  by limiting added sugars in your diet, susbstituting healthier fats for saturated and trans fats, getting more physical activity, and losing weight if your are overweight. You may also be advised to avoid or limi alcohol.    Reading food labels  Luckily, most foods now have labels giving you the facts about what you re eating. Reading food labels helps you make healthy choices. Look for the words highlighted below.    Serving Size. This is the amount of food in 1 serving. If you eat larger portions, be sure to count more of everything: fat, calories, and cholesterol.    Total Fat. Tells you how many grams (g) of fat are in 1 serving.    Calories from Fat. This tells you the total number of calories from fat in 1 serving (there are 9 calories per gram of fat). Look for foods with the fewest calories from fat.    Saturated Fat. Tells you how many grams (g) of saturated fat are in 1 serving.    Trans Fat. Tells how many grams (g) of trans fat are in 1 serving.    Cholesterol. Tells you  how many milligrams (mg) of cholesterol are in 1 serving.  Date Last Reviewed: 6/1/2017 2000-2019 [employer]. [address] [place] [address], [place] [address]. All rights reserved. This information is not intended as a substitute for professional medical care. Always follow your healthcare professional's instructions.           Patient Education     Low-Cholesterol Diet  Your body needs cholesterol to build new cells and create certain hormones. There are 2 kinds of cholesterol in your blood:       HDL ( good ) cholesterol. This prevents fat deposits (plaque) from building up in your arteries. In this way it protects against heart disease and stroke.    LDL ( bad ) cholesterol. This stays in your body and sticks to artery walls. Over time it may block blood flow to the heart and brain. This can cause a heart attack or stroke.  The cholesterol in your blood comes from 2 sources: cholesterol in food that you eat and cholesterol that your liver makes. You should limit the amount of cholesterol in your diet. But the cholesterol that your body makes has the greatest disease risk. And your body makes more cholesterol when your diet is high in bad fats (saturated and trans fats). There are 2 kinds of fats you can eat:    Good fats, or unsaturated fats (mono-unsaturated and poly-unsaturated). They raise the level of good cholesterol and lower the level of bad cholesterol. Good fats are found in vegetable oils such as olive, sunflower, corn, and soybean oils, and in nuts and seeds.    Bad fats, or saturated fats (including foods high in cholesterol) and trans fats. These raise your risk of disease. They lower the good cholesterol and raise the level of bad cholesterol. Bad fats are found in animal products, including meat, whole-milk dairy products, and butter. Some plants are also high in bad fats (coconut and palm plants). Trans fats are found in hard (stick) margarines. They are also in many fast foods and  commercially baked goods. Soft margarine sold in tubs has fewer trans fats and is safer to use.  High blood cholesterol is usually due to a diet high in saturated fat, along with not being physically active. In some cases, genetics plays a role in causing high cholesterol. The tips below will help you create healthy eating habits that will help lower your blood cholesterol level.  Create a diet high in good fats, low in bad fats (and low in cholesterol)  The following steps will help you create a diet high in good fats and low in bad fats:    Talk with your doctor before starting a low cholesterol diet or weight loss program.    Learn to read nutrition labels and select appropriate portion sizes.    When cooking, use plant-based unsaturated vegetable oils (sunflower, corn, soybean, canola, peanut, and olive oils).    Avoid saturated fats found in animal products such as meat, dairy (whole-milk, cheese and ice cream), poultry skin, and egg yolks. Plants high in saturated oils include coconut oil, palm oil, and palm kernel oil.    If you eat meat, choose smaller portions and lean cuts, such as round, brandt, sirloin, or loin. Eat more meatless meals.    Replace meat with fish at least 2 times a week. Fish is an important source of the unsaturated fat called omega-3 fatty acids. This fat has potential to lower the risk of heart disease.    Replace whole-milk dairy products with low-fat or nonfat products. Try soy products. Soy helps to reduce total cholesterol.    Supplement your diet with protective fibers. Eat nuts, seeds, and whole grains rather than white rice and bread. These foods lower both cholesterol and triglyceride levels. (Triglycerides are another fat found in the blood.) Walnuts are one of the best sources of omega-3 fatty acids.    Eat plenty of fresh fruits and vegetables daily.    Avoid fast foods and commercial baked goods. Assume they contain saturated fat unless labeled otherwise.  Date Last  Reviewed: 8/1/2016 2000-2019 [employer]. [address] [place] [address], [place] [address]. All rights reserved. This information is not intended as a substitute for professional medical care. Always follow your healthcare professional's instructions.           Patient Education     Understanding Fat and Cholesterol  Too much cholesterol in your blood can lead to many problems such as blocked arteries. This can lead to heart attack and stroke. One of the best ways to manage heart and blood vessel disease is to lower your blood cholesterol. Planning meals that are low in saturated fat and cholesterol helps reduce the level of cholesterol in your blood. Below are eating tips to help lower your blood cholesterol levels.  Eat less fat  A healthy goal is to have less than 25% to 35% of your daily calories come from fat. Instead of fats, eat more fruits, whole-grains, and vegetables. This also helps control your weight, and can even reduce your risk for some cancers. There are different kinds of fats in foods. Fats can be saturated, unsaturated, or trans fats. The best fats to choose are unsaturated fats. But fats are high in calories, so eat even unsaturated fats sparingly.  Limit foods high in saturated fats  Saturated fats come from animals and certain plants (such as coconut and palm). Eating too much saturated fat can raise your blood cholesterol levels and make your artery problems worse. Your goal is to eat less saturated fat. Below are some examples of foods that contain lots of saturated fat:    Fatty cuts of meat (lamb, ham, beef)    Many pastries, cakes, cookies, and candies    Cream, ice cream, sour cream, cheese, and butter, and foods made with them    Sauces made with butter or cream    Salad dressings with saturated fats    Foods that contain palm or coconut oil  Choose unsaturated fats  Unsaturated fats are usually liquid at room temperature. They are better choices for your heart than saturated  fat. There are two types of unsaturated fats: polyunsaturated fat and monounsaturated fat. Aim to replace saturated fats with polyunsaturated or monounsaturated fats.    Polyunsaturated fats are found in corn oil, safflower oil, sunflower oil, and other vegetable oils.    Monounsaturated fats are found in olive oil, canola oil, and peanut oil. Some margarines and spreads are now made with these oils, too. Avocados are also high in monounsaturated fat.  Of all fats, monounsaturated fats are the least harmful to your heart.  Avoid trans fats  Like saturated fats, trans fats have been linked to heart disease. Even a small amount can harm your health. Trans fats are found in liquid oils that have been changed to be solid at room temperature. Margarine, which is often made from vegetable oil, is one example. Vegetable shortening is another. Trans fats are often found in packaged goods. Check ingredients for the words  hydrogenated  or  partially hydrogenated.  They mean the foods contain trans fat.  What about triglycerides?  Triglycerides are a type of fat in your blood. Like cholesterol, high levels of triglycerides can lead to blocked arteries. High triglyceride levels can be reduced 20% to 50%  by limiting added sugars in your diet, susbstituting healthier fats for saturated and trans fats, getting more physical activity, and losing weight if your are overweight. You may also be advised to avoid or limi alcohol.    Reading food labels  Luckily, most foods now have labels giving you the facts about what you re eating. Reading food labels helps you make healthy choices. Look for the words highlighted below.    Serving Size. This is the amount of food in 1 serving. If you eat larger portions, be sure to count more of everything: fat, calories, and cholesterol.    Total Fat. Tells you how many grams (g) of fat are in 1 serving.    Calories from Fat. This tells you the total number of calories from fat in 1 serving (there  are 9 calories per gram of fat). Look for foods with the fewest calories from fat.    Saturated Fat. Tells you how many grams (g) of saturated fat are in 1 serving.    Trans Fat. Tells how many grams (g) of trans fat are in 1 serving.    Cholesterol. Tells you how many milligrams (mg) of cholesterol are in 1 serving.  Date Last Reviewed: 6/1/2017 2000-2019 [employer]. [address] [place] [address], [place] [address]. All rights reserved. This information is not intended as a substitute for professional medical care. Always follow your healthcare professional's instructions.

## 2020-06-22 NOTE — NURSING NOTE
"Chief Complaint   Patient presents with     Lipids     discuss results     Health Maintenance       Initial /82   Pulse 82   Temp 97.8  F (36.6  C) (Tympanic)   Wt 120.7 kg (266 lb)   SpO2 98%   BMI 38.17 kg/m   Estimated body mass index is 38.17 kg/m  as calculated from the following:    Height as of 3/2/20: 1.778 m (5' 10\").    Weight as of this encounter: 120.7 kg (266 lb).  Medication Reconciliation: complete  Cyndie Ang CMA  "

## 2020-06-22 NOTE — PROGRESS NOTES
Phone translation ([place])) services was used for the visit today.   SUBJECTIVE:  Loy Worthington is an 55 year old male who presents for a follow up evaluation of his hypertension.The patient was kept on the same medications at the last visit. The patient reports that he IS taking the medication as prescribed. He denies side effects of medication.  He gets blood pressure at home about 125/78 usually   He has not slept much last night     Patient Active Problem List   Diagnosis     Brain injury (H)     Renal mass     Migraine headache     CARDIOVASCULAR SCREENING; LDL GOAL LESS THAN 130     Hypertension goal BP (blood pressure) < 140/90     Bilateral corneal scars     High triglycerides     Familial hypoalphalipoproteinemia     Vitamin D deficiency     Hyperlipidemia LDL goal <130     Vitamin D deficiency disease     GERD (gastroesophageal reflux disease)     Tubular adenoma of colon     Paroxysmal atrial fibrillation (H)     Hematospermia     Lung nodule, solitary, Needs chest CT in Jan 2017     S/P laparoscopic cholecystectomy     Pulmonary nodule, right lung base needs fu scan     HDL deficiency     Diverticulosis of large intestine     Obesity (BMI 35.0-39.9) with comorbidity (H)     Post concussion syndrome       Is the HYPERTENSION goal on the problem list? Yes      Use of agents associated with hypertension: none  Current Outpatient Medications   Medication     carvedilol (COREG) 25 MG tablet     Cholecalciferol (VITAMIN D3) 2000 units CAPS     furosemide (LASIX) 40 MG tablet     lisinopril (PRINIVIL/ZESTRIL) 40 MG tablet     omeprazole (PRILOSEC) 40 MG DR capsule     rivaroxaban ANTICOAGULANT (XARELTO) 20 MG TABS tablet     spironolactone (ALDACTONE) 25 MG tablet     doxazosin (CARDURA) 8 MG tablet     order for DME     SUMAtriptan (IMITREX) 50 MG tablet     No current facility-administered medications for this visit.          Allergies   Allergen Reactions     Zocor [Simvastatin - High Dose] Swelling      Redness,itching,swelling         Social History     Tobacco Use     Smoking status: Never Smoker     Smokeless tobacco: Never Used     Tobacco comment: Lives in smoke free household   Substance Use Topics     Alcohol use: No       OBJECTIVE:  /80   Pulse 82   Temp 97.8  F (36.6  C) (Tympanic)   Wt 120.7 kg (266 lb)   SpO2 98%   BMI 38.17 kg/m      Heart: negative, PMI normal. No lifts, heaves, or thrills. RRR. No murmurs, clicks gallops or rub  Lower Extremities:1+ edema on right and 1+  edema on the left        No results found for any visits on 06/22/20.    The 10-year ASCVD risk score (Rafael TALLEY Jr., et al., 2013) is: 9.8%    Values used to calculate the score:      Age: 55 years      Sex: Male      Is Non- : No      Diabetic: No      Tobacco smoker: No      Systolic Blood Pressure: 134 mmHg      Is BP treated: Yes      HDL Cholesterol: 31 mg/dL      Total Cholesterol: 172 mg/dL    ASSESSMENT:  Essential hypertension which is marginally controlled.       Plan:  - Medication:continue the current doses of medication.  The patients antihypertensive medication was filled for 12 months.  The patient was advised to do the following therapuetic life style changes  - Dietary sodium restriction and increase potassium and Calcium intake  - Regular aerobic exercise  - Weight loss  - Discontinue smoking if applicable  - Avoid regular NSAID use if applicable  - Avoid regular decongestant use if applicable  - Follow up in clinic in March 2021 for a recheck/ COMPLETE PHYSICAL EXAM        Patient Education: Reviewed risks of hypertension and principles of   Treatment.    --------------------------------------------------------------------------------------------------------------------------------------  SUBJECTIVE:    Loy Worthington is a 55 year old male who  presents for a recheck of dyslipidemia.    The 10-year ASCVD risk score (Rafael TALLEY Jr. et al., 2013) is: 9.8%    Values used to calculate the  score:      Age: 55 years      Sex: Male      Is Non- : No      Diabetic: No      Tobacco smoker: No      Systolic Blood Pressure: 134 mmHg      Is BP treated: Yes      HDL Cholesterol: 31 mg/dL      Total Cholesterol: 172 mg/dL    Patient Active Problem List   Diagnosis     Brain injury (H)     Renal mass     Migraine headache     CARDIOVASCULAR SCREENING; LDL GOAL LESS THAN 130     Hypertension goal BP (blood pressure) < 140/90     Bilateral corneal scars     High triglycerides     Familial hypoalphalipoproteinemia     Vitamin D deficiency     Hyperlipidemia LDL goal <130     Vitamin D deficiency disease     GERD (gastroesophageal reflux disease)     Tubular adenoma of colon     Paroxysmal atrial fibrillation (H)     Hematospermia     Lung nodule, solitary, Needs chest CT in Jan 2017     S/P laparoscopic cholecystectomy     Pulmonary nodule, right lung base needs fu scan     HDL deficiency     Diverticulosis of large intestine     Obesity (BMI 35.0-39.9) with comorbidity (H)     Post concussion syndrome       Family History   Problem Relation Age of Onset     Hypertension Father      Hypertension Brother      Macular Degeneration No family hx of      Glaucoma No family hx of      Thyroid Disease No family hx of      Cancer No family hx of      Diabetes No family hx of      Cerebrovascular Disease No family hx of        Social History     Tobacco Use     Smoking status: Never Smoker     Smokeless tobacco: Never Used     Tobacco comment: Lives in smoke free household   Substance Use Topics     Alcohol use: No       Current Outpatient Medications   Medication Sig Dispense Refill     carvedilol (COREG) 25 MG tablet Take 1 tablet (25 mg) by mouth 2 times daily 180 tablet 3     Cholecalciferol (VITAMIN D3) 2000 units CAPS TAKE 1 CAPSULE BY MOUTH EVERY DAY 90 capsule 3     furosemide (LASIX) 40 MG tablet TAKE 1 TABLET(40 MG) BY MOUTH EVERY MORNING 90 tablet 3     lisinopril (PRINIVIL/ZESTRIL) 40  "MG tablet Take 1 tablet (40 mg) by mouth daily 90 tablet 3     omeprazole (PRILOSEC) 40 MG DR capsule TAKE 1 CAPSULE(40 MG) BY MOUTH DAILY 30 TO 60 MINUTES BEFORE A MEAL 90 capsule 3     rivaroxaban ANTICOAGULANT (XARELTO) 20 MG TABS tablet Take 1 tablet (20 mg) by mouth daily (with dinner) 90 tablet 1     spironolactone (ALDACTONE) 25 MG tablet Take 0.5 tablets (12.5 mg) by mouth daily 90 tablet 3     doxazosin (CARDURA) 8 MG tablet TAKE 1 TABLET BY MOUTH EVERY NIGHT AT BEDTIME 90 tablet 3     order for DME Equipment being ordered: RIGHT knee \"\" style brace to correct varus deformity.    Diagnosis: ICD-10: M17.11 Osteoarthritis of right knee, unspecified osteoarthritis type     Length of need : 99mo (Patient not taking: Reported on 6/22/2020) 1 each 0     SUMAtriptan (IMITREX) 50 MG tablet TAKE 1 TABLET BY MOUTH AT ONSET OF HEADACHE AND MAY REPEAT IN 2 HOURS IF NEEDED, MAX 2 TABLETS DAILY (Patient not taking: Reported on 6/22/2020) 27 tablet 3       The patient denies any side effects from his cholesterol medications.n/a      The patient reports that he does exercise as he has an active job as a  and works 5 times a week.    The patient is currently following a low fat diet plan.     He cut sugar and bread out of his diet.  He is staying away form fatty foods most of the time.     Review of lipid profiles:    Lab Results   Component Value Date    CHOL 172 06/08/2020    CHOL 159 03/02/2020    CHOL 168 01/28/2019    CHOL 144 05/31/2018    CHOL 164 01/22/2018    CHOL 142 06/15/2016    CHOL 171 04/08/2015    CHOL 172 09/18/2014    CHOL 161 01/13/2014    CHOL 170 06/28/2013    CHOL 193 01/28/2013    CHOL 213 09/10/2012     Lab Results   Component Value Date    LDL 78 06/08/2020    LDL 80 03/02/2020    LDL 96 01/28/2019    LDL 76 05/31/2018    LDL 92 01/22/2018    LDL 62 06/15/2016    LDL 89 04/08/2015     09/18/2014     01/13/2014    LDL 99 06/28/2013    LDL  01/28/2013     Cannot estimate " LDL when triglyceride exceeds 400 mg/dL     01/28/2013     09/10/2012     Lab Results   Component Value Date    HDL 31 06/08/2020    HDL 31 03/02/2020    HDL 34 01/28/2019    HDL 31 05/31/2018    HDL 35 01/22/2018    HDL 32 06/15/2016    HDL 32 04/08/2015    HDL 32 09/18/2014    HDL 27 01/13/2014    HDL 30 06/28/2013    HDL 29 01/28/2013    HDL 31 09/10/2012     Lab Results   Component Value Date    TRIG 313 06/08/2020    TRIG 241 03/02/2020    TRIG 188 01/28/2019    TRIG 184 05/31/2018    TRIG 184 01/22/2018    TRIG 239 06/15/2016    TRIG 252 04/08/2015    TRIG 161 09/18/2014    TRIG 168 01/13/2014    TRIG 206 06/28/2013    TRIG 427 01/28/2013    TRIG 233 09/10/2012         Risk Enhancers:  Family history of premature ASCVD- No  LDL >159- No  Chronic kidney disease- No  Metabolic Syndrome- No  Premature menopause- No  Inflammatory conditions (RA, psoriasis, HIV)- No  SE  Ancestry- No  Triglycerides >174- No      Thyroid study review:  Lab Results   Component Value Date    TSH 1.95 06/11/2019    TSH 1.60 02/19/2019    TSH 1.43 09/18/2014    TSH 1.67 12/21/2012    TSH 1.34 01/05/2012    TSH 2.36 01/20/2010       OBJECTIVE:  No apparent distress  Blood pressure 134/80, pulse 82, temperature 97.8  F (36.6  C), temperature source Tympanic, weight 120.7 kg (266 lb), SpO2 98 %.  Abdomen: soft, positive for bowel sounds, contender, no  hepatosplenomegaly.    ASSESSMENT:   Dyslipidemia  The 10-year ASCVD risk score (Rafaeldesiree TALLEY Jr., et al., 2013) is: 9.8%    Values used to calculate the score:      Age: 55 years      Sex: Male      Is Non- : No      Diabetic: No      Tobacco smoker: No      Systolic Blood Pressure: 134 mmHg      Is BP treated: Yes      HDL Cholesterol: 31 mg/dL      Total Cholesterol: 172 mg/dL      PLAN:   With a 9.8 % 10 year risk of having MI/Stroke by ACC/AHA risk calculator and no risk enhancers, the patient's LDL is acceptable at 78.  his HDL is too low.  his  triglycerides are too high.    We have discussed the results.  An educational reference regarding this subject was printed from [employer] and given to the patient.  The patient was encouraged to return for a yearly physical at which time we will recheck the fasting lipid panel.    Follow-up in March of 2021 for his physical. We will do a fasting lipid profile in the laboratory 1 week before.        --------------------------------------------------------------------------------------------------------------------------------------    SUBJECTIVE:  Loy Worthington is a 55 year old male who is seen for pain in his tricep area bilateral(ly).  The left is worse than the right.   He denies any injection but he has a physical job as a .   He denies neck pain but does reports that either arm can fall asleep at night and this causes pain.   The patients past medical, surgical, social and family histories were reviewed.  Social History     Socioeconomic History     Marital status:      Spouse name: None     Number of children: None     Years of education: None     Highest education level: None   Occupational History     None   Social Needs     Financial resource strain: None     Food insecurity     Worry: None     Inability: None     Transportation needs     Medical: None     Non-medical: None   Tobacco Use     Smoking status: Never Smoker     Smokeless tobacco: Never Used     Tobacco comment: Lives in smoke free household   Substance and Sexual Activity     Alcohol use: No     Drug use: No     Sexual activity: Yes     Partners: Female   Lifestyle     Physical activity     Days per week: None     Minutes per session: None     Stress: None   Relationships     Social connections     Talks on phone: None     Gets together: None     Attends [employer] service: None     Active member of club or organization: None     Attends meetings of clubs or organizations: None     Relationship status: None     Intimate partner violence     " Fear of current or ex partner: None     Emotionally abused: None     Physically abused: None     Forced sexual activity: None   Other Topics Concern     Parent/sibling w/ CABG, MI or angioplasty before 65F 55M? Not Asked   Social History Narrative     None     Past Medical History:   Diagnosis Date     Arthritis     hand- joint pain     Bilateral corneal scars 04/07/2011    Molten metal in right eye, acid in left eye     Brain injury (H)     fall from a great height     Cognitive deficits      Hematuria 03/19/2009     Hypercholesterolemia      Hypertension      Low back pain 03/19/2009     Pain in joint, forearm 11/11/2008     Renal cyst 04/06/2009     Trigger finger (acquired) 11/11/2008    right long     Current Outpatient Medications   Medication Sig Dispense Refill     carvedilol (COREG) 25 MG tablet Take 1 tablet (25 mg) by mouth 2 times daily 180 tablet 3     Cholecalciferol (VITAMIN D3) 2000 units CAPS TAKE 1 CAPSULE BY MOUTH EVERY DAY 90 capsule 3     furosemide (LASIX) 40 MG tablet TAKE 1 TABLET(40 MG) BY MOUTH EVERY MORNING 90 tablet 3     lisinopril (PRINIVIL/ZESTRIL) 40 MG tablet Take 1 tablet (40 mg) by mouth daily 90 tablet 3     omeprazole (PRILOSEC) 40 MG DR capsule TAKE 1 CAPSULE(40 MG) BY MOUTH DAILY 30 TO 60 MINUTES BEFORE A MEAL 90 capsule 3     rivaroxaban ANTICOAGULANT (XARELTO) 20 MG TABS tablet Take 1 tablet (20 mg) by mouth daily (with dinner) 90 tablet 1     spironolactone (ALDACTONE) 25 MG tablet Take 0.5 tablets (12.5 mg) by mouth daily 90 tablet 3     doxazosin (CARDURA) 8 MG tablet TAKE 1 TABLET BY MOUTH EVERY NIGHT AT BEDTIME 90 tablet 3     order for DME Equipment being ordered: RIGHT knee \"\" style brace to correct varus deformity.    Diagnosis: ICD-10: M17.11 Osteoarthritis of right knee, unspecified osteoarthritis type     Length of need : 99mo (Patient not taking: Reported on 6/22/2020) 1 each 0     SUMAtriptan (IMITREX) 50 MG tablet TAKE 1 TABLET BY MOUTH AT ONSET " OF HEADACHE AND MAY REPEAT IN 2 HOURS IF NEEDED, MAX 2 TABLETS DAILY (Patient not taking: Reported on 6/22/2020) 27 tablet 3       REVIEW OF SYSTEMS:  CONSTITUTIONAL:NEGATIVE for fever, chills, change in weight  INTEGUMENTARY/SKIN: NEGATIVE for worrisome rashes, moles or lesions  MUSCULOSKELETAL:See HPI above        Allergies as of 06/22/2020 - Reviewed 06/22/2020   Allergen Reaction Noted     Zocor [simvastatin - high dose] Swelling 07/22/2013     Current Outpatient Medications   Medication     carvedilol (COREG) 25 MG tablet     Cholecalciferol (VITAMIN D3) 2000 units CAPS     furosemide (LASIX) 40 MG tablet     lisinopril (PRINIVIL/ZESTRIL) 40 MG tablet     omeprazole (PRILOSEC) 40 MG DR capsule     rivaroxaban ANTICOAGULANT (XARELTO) 20 MG TABS tablet     spironolactone (ALDACTONE) 25 MG tablet     doxazosin (CARDURA) 8 MG tablet     order for DME     SUMAtriptan (IMITREX) 50 MG tablet     No current facility-administered medications for this visit.          Examination:  /80   Pulse 82   Temp 97.8  F (36.6  C) (Tympanic)   Wt 120.7 kg (266 lb)   SpO2 98%   BMI 38.17 kg/m    General: healthy, alert and no distress.  PSYCH:  mentation appears normal and affect normal/bright      Strength was +5 globally in the upper extremities.       Musculoskeletal:  Inspection: normal cervical lordosis    Spurling's manuever was negative bilaterally.  There was not decreased active range of motion in neck.    Tang's test was normal bilateral(ly)   Elbows: full active range of motion without pain  Shoulders. Full active range of motion without  Pain    MUSCULOSKELETAL:  Bilateral SHOULDER  Inspection: no swelling, bruising, discoloration, or obvious deformity or asymmetry  Tender areas include: tricep  Non-tender areas include: SC joint, proximal-mid clavicle, mid-distal clavicle, AC joint, acromion, anterior capsule, proximal bicep tendon, greater tuberosity, proximal humerus, supraspinatus , infraspinatus, upper  trapezius muscle and rhomboids  Range of Motion  Active:all normal  Passive: all normal  Strength: rotator cuff strength full  Special tests:  Positive: none  Negative: Speed's , Neer impingement, Perry Galraza impingement, empty can, opposition to external rotation and opposition to internal rotation            ASSESSMENT/IMPRESSION:  musculoskeletal arm pain    PLAN:    The patient can not take non steroidal anti-inflammatory drugs because of his anticoagulation so I gave him tramadol to take as needed.   Heat therapy in the morning to improve range of motion was emphasized. The patient was advised only to use ice at the end of the day for pain relief if needed.  Follow up in 3 months. if the symptom(s) are not improving.

## 2020-07-04 DIAGNOSIS — E55.9 VITAMIN D DEFICIENCY DISEASE: ICD-10-CM

## 2020-07-04 DIAGNOSIS — G43.809 OTHER MIGRAINE WITHOUT STATUS MIGRAINOSUS, NOT INTRACTABLE: ICD-10-CM

## 2020-07-06 RX ORDER — ACETAMINOPHEN 160 MG
TABLET,DISINTEGRATING ORAL
Qty: 90 CAPSULE | Refills: 3 | Status: SHIPPED | OUTPATIENT
Start: 2020-07-06 | End: 2021-08-11

## 2020-07-06 RX ORDER — SUMATRIPTAN 50 MG/1
TABLET, FILM COATED ORAL
Qty: 27 TABLET | Refills: 3 | Status: SHIPPED | OUTPATIENT
Start: 2020-07-06 | End: 2021-08-11

## 2020-11-08 DIAGNOSIS — I10 ESSENTIAL HYPERTENSION WITH GOAL BLOOD PRESSURE LESS THAN 140/90: ICD-10-CM

## 2020-11-10 ENCOUNTER — TRANSFERRED RECORDS (OUTPATIENT)
Dept: HEALTH INFORMATION MANAGEMENT | Facility: CLINIC | Age: 55
End: 2020-11-10

## 2020-11-10 RX ORDER — CARVEDILOL 25 MG/1
TABLET ORAL
Qty: 180 TABLET | Refills: 0 | Status: SHIPPED | OUTPATIENT
Start: 2020-11-10 | End: 2020-12-10

## 2020-11-10 NOTE — TELEPHONE ENCOUNTER
Prescription approved per [facility] Refill Protocol.  Per 6/22/20 office visit notes due for office visit March 2021.  Dalia Guevara RN

## 2020-12-08 DIAGNOSIS — I10 ESSENTIAL HYPERTENSION WITH GOAL BLOOD PRESSURE LESS THAN 140/90: ICD-10-CM

## 2020-12-10 RX ORDER — SPIRONOLACTONE 25 MG/1
TABLET ORAL
Qty: 45 TABLET | Refills: 0 | Status: SHIPPED | OUTPATIENT
Start: 2020-12-10 | End: 2021-09-23

## 2020-12-10 RX ORDER — CARVEDILOL 25 MG/1
TABLET ORAL
Qty: 180 TABLET | Refills: 0 | Status: SHIPPED | OUTPATIENT
Start: 2020-12-10 | End: 2021-05-10

## 2020-12-28 NOTE — PROGRESS NOTES
SUBJECTIVE:   Loy Worthington is a 54 year old male who is seen in consultation at the request of Dr. Sanches for evaluation of bilateral knee pain, right>left.   His right knee started having pain in May 2019 without injury. Many severe episodes of night throbbing pain. He reports swelling and a bump in the knee. Pain is located over the proximal anterolateral and anteromedial knee. Shooting pain with ambulation. Denies mechanical symptoms.   Patient twisted his left knee about 1.5 months ago. Left knee was treated with chiropractic therapy. Since then, pain is tolerable but continues to have pain.     He was seen with an  and discussed his condition with his brother, who is a doctor, on the phone in clinic today.     Present symptoms: ongoing bilateral knee pain, right distal thigh pain   Symptoms occur when: with walking    Treatments tried to this point: none    Orthopedic PMH: none    Review of Systems:  Constitutional:  NEGATIVE for fever, chills, change in weight  Integumentary/Skin:  NEGATIVE for worrisome rashes, moles or lesions  Eyes:  NEGATIVE for vision changes or irritation  ENT/Mouth:  NEGATIVE for ear, mouth and throat problems  Resp:  NEGATIVE for significant cough or SOB  Breast:  NEGATIVE for masses, tenderness or discharge  CV:  NEGATIVE for chest pain, palpitations or peripheral edema  GI:  NEGATIVE for nausea, abdominal pain, heartburn, or change in bowel habits  :  Negative   Musculoskeletal:  See HPI above  Neuro:  NEGATIVE for weakness, dizziness or paresthesias  Endocrine:  NEGATIVE for temperature intolerance, skin/hair changes  Heme/allergy/immune:  NEGATIVE for bleeding problems  Psychiatric:  NEGATIVE for changes in mood or affect    Past Medical History:   Past Medical History:   Diagnosis Date     Arthritis     hand- joint pain     Bilateral corneal scars 04/07/2011    Molten metal in right eye, acid in left eye     Brain injury (H)     fall from a great height      Cognitive deficits      Hematuria 03/19/2009     Hypercholesterolemia      Hypertension      Low back pain 03/19/2009     Pain in joint, forearm 11/11/2008     Renal cyst 04/06/2009     Trigger finger (acquired) 11/11/2008    right long     Past Surgical History:   Past Surgical History:   Procedure Laterality Date     CHOLECYSTECTOMY       ENDOSCOPY  12/12/2005    upper GI endoscopy     ESOPHAGOSCOPY, GASTROSCOPY, DUODENOSCOPY (EGD), COMBINED N/A 1/25/2016    Procedure: COMBINED ESOPHAGOSCOPY, GASTROSCOPY, DUODENOSCOPY (EGD);  Surgeon: Daivd Campos MD;  Location: [facility] OR     ESOPHAGOSCOPY, GASTROSCOPY, DUODENOSCOPY (EGD), COMBINED N/A 1/25/2016    Procedure: COMBINED ESOPHAGOSCOPY, GASTROSCOPY, DUODENOSCOPY (EGD), BIOPSY SINGLE OR MULTIPLE;  Surgeon: David Campos MD;  Location: [facility] OR     H ABLATION ATRIAL FLUTTER  May 2016    Dr Cardenas at [facility]     NASAL/SINUS POLYPECTOMY      Nasal-Sinus Polypectomy     SURGICAL HISTORY OF -       cale holes, subdural due to fall     SURGICAL HISTORY OF -       ?tympanoplasty/mastoid with brain trauma     Family History:   Family History   Problem Relation Age of Onset     Hypertension Father      Hypertension Brother      Macular Degeneration No family hx of      Glaucoma No family hx of      Thyroid Disease No family hx of      Cancer No family hx of      Diabetes No family hx of      Cerebrovascular Disease No family hx of      Social History:   Social History     Tobacco Use     Smoking status: Never Smoker     Smokeless tobacco: Never Used     Tobacco comment: Lives in smoke free household   Substance Use Topics     Alcohol use: No     This document serves as a record of the services and decisions personally performed and made by KARLEY Foster MD. It was created on his behalf by Larisa Resendiz, a trained medical scribe. The creation of this document is based the provider's statements to the medical scribe.    Scribe Larisa Resendiz 4:38 PM 7/11/2019          OBJECTIVE:  Physical Exam:  /85 (BP Location: Right arm, Patient Position: Sitting, Cuff Size: Adult Regular)   Pulse 83   Wt 117 kg (258 lb)   SpO2 96%   BMI 37.02 kg/m    General Appearance: healthy, alert and no distress   Skin: no suspicious lesions or rashes  Neuro: Normal strength and tone, mentation intact and speech normal  Vascular: good pulses, and capillary refill   Lymph: no lymphadenopathy   Psych:  mentation appears normal and affect normal/bright  Resp: no increased work of breathing     Bilateral Knee Exam:  No pain with hip range of motion.  Gait: walks with normal gait  Squat: 70 % limited by pain in right knee.     Left Knee Right Knee   Alignment normal  normal    Patellofemoral Joint moderate crepitations moderate crepitations   Effusion mild mild   ROM 0-140 degrees  0-120*   Tender mild medial joint line  Medial joint line,    Ligaments Lachman's: stable  Anterior/Posterior Drawer: stable  Varus/Valgus stress: stable Lachman's: stable  Anterior/Posterior Drawer: stable  Varus/Valgus stress: stable   McMurrays negative positive     X-rays:  Obtained x-rays of the bilateral knees: 3-views, reviewed in the office with the patient by myself today and show  Right knee: minimal medial compartment degenerative changes and mild to moderate degenerative changes in the patellofemoral joint.   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      ASSESSMENT:   1. Bilateral knees mild osteoarthritis, possible right knee medial meniscus tear.     PLAN:   We discussed treatment options today. Possible right knee meniscus tear. I offered a couple options today, the first being an MRI.  The second option would be a steroid injection.   MRI was ordered and the patient was advised to return to clinic once done with MRI to discuss results and treatment options.       The information in this document, created by a scribe for me, accurately reflects the  services I personally performed and the decisions made by me. I have reviewed and approved this document for accuracy.      KARLEY Foster MD  Dept. Orthopedic Surgery  [facility]        good minus

## 2021-01-20 ENCOUNTER — TRANSFERRED RECORDS (OUTPATIENT)
Dept: HEALTH INFORMATION MANAGEMENT | Facility: CLINIC | Age: 56
End: 2021-01-20

## 2021-05-09 DIAGNOSIS — R10.13 ABDOMINAL PAIN, EPIGASTRIC: ICD-10-CM

## 2021-05-09 DIAGNOSIS — R60.0 PEDAL EDEMA: ICD-10-CM

## 2021-05-09 DIAGNOSIS — I10 ESSENTIAL HYPERTENSION WITH GOAL BLOOD PRESSURE LESS THAN 140/90: ICD-10-CM

## 2021-05-10 RX ORDER — DOXAZOSIN 8 MG/1
TABLET ORAL
Qty: 90 TABLET | Refills: 3 | Status: SHIPPED | OUTPATIENT
Start: 2021-05-10 | End: 2021-09-23

## 2021-05-10 RX ORDER — OMEPRAZOLE 40 MG/1
CAPSULE, DELAYED RELEASE ORAL
Qty: 90 CAPSULE | Refills: 0 | Status: SHIPPED | OUTPATIENT
Start: 2021-05-10 | End: 2021-08-11

## 2021-05-10 RX ORDER — CARVEDILOL 25 MG/1
TABLET ORAL
Qty: 180 TABLET | Refills: 0 | Status: SHIPPED | OUTPATIENT
Start: 2021-05-10 | End: 2021-08-11

## 2021-05-10 NOTE — TELEPHONE ENCOUNTER
"Requested Prescriptions   Pending Prescriptions Disp Refills    doxazosin (CARDURA) 8 MG tablet [Pharmacy Med Name: DOXAZOSIN 8MG TABLETS] 90 tablet 3     Sig: TAKE 1 TABLET BY MOUTH EVERY NIGHT AT BEDTIME       Antiadrenergic Antihypertensives Failed - 5/9/2021  4:45 PM        Failed - Blood pressure less than 140/90 in past 6 months     BP Readings from Last 3 Encounters:   06/22/20 134/80   03/02/20 (!) 141/96   11/01/19 122/73                 Failed - Normal serum creatinine on file in past 12 months     Recent Labs   Lab Test 03/02/20  1008   CR 0.74       Ok to refill medication if creatinine is low          Failed - Recent (6 mo) or future (30 days) visit within the authorizing provider's specialty     Patient had office visit in the last 6 months or has a visit in the next 30 days with authorizing provider or within the authorizing provider's specialty.  See \"Patient Info\" tab in inbasket, or \"Choose Columns\" in Meds & Orders section of the refill encounter.            Passed - Medication is active on med list        Passed - Patient is age 18 or older       Alpha Blockers Passed - 5/9/2021  4:45 PM        Passed - Blood pressure under 140/90 in past 12 months     BP Readings from Last 3 Encounters:   06/22/20 134/80   03/02/20 (!) 141/96   11/01/19 122/73                 Passed - Recent (12 mo) or future (30 days) visit within the authorizing provider's specialty     Patient has had an office visit with the authorizing provider or a provider within the authorizing providers department within the previous 12 mos or has a future within next 30 days. See \"Patient Info\" tab in inbasket, or \"Choose Columns\" in Meds & Orders section of the refill encounter.              Passed - Patient does not have Tadalafil, Vardenafil, or Sildenafil on their medication list        Passed - Medication is active on med list        Passed - Patient is 18 years of age or older         Signed Prescriptions Disp Refills    " "carvedilol (COREG) 25 MG tablet 180 tablet 0     Sig: TAKE 1 TABLET(25 MG) BY MOUTH TWICE DAILY       Beta-Blockers Protocol Passed - 5/9/2021  4:45 PM        Passed - Blood pressure under 140/90 in past 12 months     BP Readings from Last 3 Encounters:   06/22/20 134/80   03/02/20 (!) 141/96   11/01/19 122/73                 Passed - Patient is age 6 or older        Passed - Recent (12 mo) or future (30 days) visit within the authorizing provider's specialty     Patient has had an office visit with the authorizing provider or a provider within the authorizing providers department within the previous 12 mos or has a future within next 30 days. See \"Patient Info\" tab in inbasket, or \"Choose Columns\" in Meds & Orders section of the refill encounter.              Passed - Medication is active on med list          omeprazole (PRILOSEC) 40 MG DR capsule 90 capsule 0     Sig: TAKE 1 CAPSULE(40 MG) BY MOUTH DAILY 30 TO 60 MINUTES BEFORE A MEAL       PPI Protocol Passed - 5/9/2021  4:45 PM        Passed - Not on Clopidogrel (unless Pantoprazole ordered)        Passed - No diagnosis of osteoporosis on record        Passed - Recent (12 mo) or future (30 days) visit within the authorizing provider's specialty     Patient has had an office visit with the authorizing provider or a provider within the authorizing providers department within the previous 12 mos or has a future within next 30 days. See \"Patient Info\" tab in inbasket, or \"Choose Columns\" in Meds & Orders section of the refill encounter.              Passed - Medication is active on med list        Passed - Patient is age 18 or older             "

## 2021-08-09 DIAGNOSIS — R60.0 PEDAL EDEMA: ICD-10-CM

## 2021-08-09 DIAGNOSIS — R10.13 ABDOMINAL PAIN, EPIGASTRIC: ICD-10-CM

## 2021-08-09 DIAGNOSIS — G43.809 OTHER MIGRAINE WITHOUT STATUS MIGRAINOSUS, NOT INTRACTABLE: ICD-10-CM

## 2021-08-09 DIAGNOSIS — I10 ESSENTIAL HYPERTENSION WITH GOAL BLOOD PRESSURE LESS THAN 140/90: ICD-10-CM

## 2021-08-09 DIAGNOSIS — E55.9 VITAMIN D DEFICIENCY DISEASE: ICD-10-CM

## 2021-08-09 NOTE — LETTER
August 11, 2021    Loy Worthington  [id number] [facility] [id number]    Dear Loy,       We recently received a refill request for your medicatrions.  We have refilled this for a one time 90 day supply only because you are due for a:    Medication check office visit      Please call at your earliest convenience so that there will not be a delay with your future refills.          Thank you,   Your [facility] Team/  [phone]

## 2021-08-11 RX ORDER — OMEPRAZOLE 40 MG/1
CAPSULE, DELAYED RELEASE ORAL
Qty: 90 CAPSULE | Refills: 0 | Status: SHIPPED | OUTPATIENT
Start: 2021-08-11 | End: 2021-09-23

## 2021-08-11 RX ORDER — SUMATRIPTAN 50 MG/1
TABLET, FILM COATED ORAL
Qty: 27 TABLET | Refills: 0 | Status: SHIPPED | OUTPATIENT
Start: 2021-08-11 | End: 2021-09-23

## 2021-08-11 RX ORDER — LISINOPRIL 40 MG/1
TABLET ORAL
Qty: 90 TABLET | Refills: 0 | Status: SHIPPED | OUTPATIENT
Start: 2021-08-11 | End: 2021-09-23

## 2021-08-11 RX ORDER — ACETAMINOPHEN 160 MG
TABLET,DISINTEGRATING ORAL
Qty: 90 CAPSULE | Refills: 0 | Status: SHIPPED | OUTPATIENT
Start: 2021-08-11 | End: 2021-09-23

## 2021-08-11 RX ORDER — CARVEDILOL 25 MG/1
TABLET ORAL
Qty: 180 TABLET | Refills: 0 | Status: SHIPPED | OUTPATIENT
Start: 2021-08-11 | End: 2021-09-23

## 2021-08-11 NOTE — TELEPHONE ENCOUNTER
Please let pt know he is due for follow-up with Dr. Sanches as it has been over 1 year since his last visit.     MAURA Morales CNP

## 2021-08-11 NOTE — TELEPHONE ENCOUNTER
"Requested Prescriptions   Pending Prescriptions Disp Refills    carvedilol (COREG) 25 MG tablet [Pharmacy Med Name: CARVEDILOL 25MG TABLETS] 180 tablet 0     Sig: TAKE 1 TABLET(25 MG) BY MOUTH TWICE DAILY        Beta-Blockers Protocol Failed - 8/9/2021  7:41 PM        Failed - Blood pressure under 140/90 in past 12 months       BP Readings from Last 3 Encounters:   06/22/20 134/80   03/02/20 (!) 141/96   11/01/19 122/73                 Failed - Recent (12 mo) or future (30 days) visit within the authorizing provider's specialty     Patient has had an office visit with the authorizing provider or a provider within the authorizing providers department within the previous 12 mos or has a future within next 30 days. See \"Patient Info\" tab in inbasket, or \"Choose Columns\" in Meds & Orders section of the refill encounter.              Passed - Patient is age 6 or older        Passed - Medication is active on med list           lisinopril (ZESTRIL) 40 MG tablet [Pharmacy Med Name: LISINOPRIL 40MG TABLETS] 90 tablet 3     Sig: TAKE 1 TABLET(40 MG) BY MOUTH DAILY        ACE Inhibitors (Including Combos) Protocol Failed - 8/9/2021  7:41 PM        Failed - Blood pressure under 140/90 in past 12 months       BP Readings from Last 3 Encounters:   06/22/20 134/80   03/02/20 (!) 141/96   11/01/19 122/73                 Failed - Recent (12 mo) or future (30 days) visit within the authorizing provider's specialty     Patient has had an office visit with the authorizing provider or a provider within the authorizing providers department within the previous 12 mos or has a future within next 30 days. See \"Patient Info\" tab in inbasket, or \"Choose Columns\" in Meds & Orders section of the refill encounter.              Failed - Normal serum creatinine on file in past 12 months     Recent Labs   Lab Test 03/02/20  1008   CR 0.74       Ok to refill medication if creatinine is low          Failed - Normal serum potassium on file in past 12 " "months     Recent Labs   Lab Test 03/02/20  1008   POTASSIUM 4.0             Passed - Medication is active on med list        Passed - Patient is age 18 or older           omeprazole (PRILOSEC) 40 MG DR capsule [Pharmacy Med Name: OMEPRAZOLE 40MG CAPSULES] 90 capsule 0     Sig: TAKE 1 CAPSULE(40 MG) BY MOUTH DAILY 30 TO 60 MINUTES BEFORE A MEAL        PPI Protocol Failed - 8/9/2021  7:41 PM        Failed - Recent (12 mo) or future (30 days) visit within the authorizing provider's specialty     Patient has had an office visit with the authorizing provider or a provider within the authorizing providers department within the previous 12 mos or has a future within next 30 days. See \"Patient Info\" tab in inbasket, or \"Choose Columns\" in Meds & Orders section of the refill encounter.              Passed - Not on Clopidogrel (unless Pantoprazole ordered)        Passed - No diagnosis of osteoporosis on record        Passed - Medication is active on med list        Passed - Patient is age 18 or older           SUMAtriptan (IMITREX) 50 MG tablet [Pharmacy Med Name: SUMATRIPTAN 50MG TABLETS] 27 tablet 3     Sig: TAKE 1 TABLET BY MOUTH AT ONSET OF HEADACHE AND MAY REPEAT IN 2 HOURS IF NEEDED, MAX 2 TABLETS DAILY        Serotonin Agonists Failed - 8/9/2021  7:41 PM        Failed - Blood pressure under 140/90 in past 12 months       BP Readings from Last 3 Encounters:   06/22/20 134/80   03/02/20 (!) 141/96   11/01/19 122/73                 Failed - Serotonin Agonist request needs review.     Please review patient's record. If patient has had 8 or more treatments in the past month, please forward to provider.          Failed - Recent (12 mo) or future (30 days) visit within the authorizing provider's specialty     Patient has had an office visit with the authorizing provider or a provider within the authorizing providers department within the previous 12 mos or has a future within next 30 days. See \"Patient Info\" tab in inbasket, " "or \"Choose Columns\" in Meds & Orders section of the refill encounter.              Passed - Medication is active on med list        Passed - Patient is age 18 or older           Cholecalciferol (VITAMIN D3) 50 MCG (2000 UT) CAPS [Pharmacy Med Name: VITAMIN D3 2,000UNIT CAPSULES] 90 capsule 3     Sig: TAKE 1 CAPSULE BY MOUTH EVERY DAY        Vitamin Supplements (Adult) Protocol Failed - 8/9/2021  7:41 PM        Failed - Recent (12 mo) or future (30 days) visit within the authorizing provider's specialty     Patient has had an office visit with the authorizing provider or a provider within the authorizing providers department within the previous 12 mos or has a future within next 30 days. See \"Patient Info\" tab in inbasket, or \"Choose Columns\" in Meds & Orders section of the refill encounter.              Passed - High dose Vitamin D not ordered        Passed - Medication is active on med list              "

## 2021-09-23 ENCOUNTER — OFFICE VISIT (OUTPATIENT)
Dept: FAMILY MEDICINE | Facility: CLINIC | Age: 56
End: 2021-09-23
Payer: COMMERCIAL

## 2021-09-23 VITALS
OXYGEN SATURATION: 98 % | SYSTOLIC BLOOD PRESSURE: 144 MMHG | TEMPERATURE: 97.9 F | BODY MASS INDEX: 39.94 KG/M2 | WEIGHT: 279 LBS | DIASTOLIC BLOOD PRESSURE: 85 MMHG | HEART RATE: 71 BPM | HEIGHT: 70 IN

## 2021-09-23 DIAGNOSIS — R10.13 ABDOMINAL PAIN, EPIGASTRIC: ICD-10-CM

## 2021-09-23 DIAGNOSIS — I10 HYPERTENSION GOAL BP (BLOOD PRESSURE) < 140/90: ICD-10-CM

## 2021-09-23 DIAGNOSIS — E78.5 HYPERLIPIDEMIA LDL GOAL <130: ICD-10-CM

## 2021-09-23 DIAGNOSIS — E55.9 VITAMIN D DEFICIENCY DISEASE: ICD-10-CM

## 2021-09-23 DIAGNOSIS — G43.809 OTHER MIGRAINE WITHOUT STATUS MIGRAINOSUS, NOT INTRACTABLE: ICD-10-CM

## 2021-09-23 DIAGNOSIS — E78.6 HDL DEFICIENCY: ICD-10-CM

## 2021-09-23 DIAGNOSIS — E78.1 HIGH TRIGLYCERIDES: ICD-10-CM

## 2021-09-23 DIAGNOSIS — E55.9 VITAMIN D DEFICIENCY: ICD-10-CM

## 2021-09-23 DIAGNOSIS — R60.0 PEDAL EDEMA: ICD-10-CM

## 2021-09-23 DIAGNOSIS — I48.0 PAROXYSMAL ATRIAL FIBRILLATION (H): ICD-10-CM

## 2021-09-23 DIAGNOSIS — K76.0 FATTY LIVER: ICD-10-CM

## 2021-09-23 DIAGNOSIS — Z00.00 ROUTINE GENERAL MEDICAL EXAMINATION AT A HEALTH CARE FACILITY: Primary | ICD-10-CM

## 2021-09-23 DIAGNOSIS — Z12.5 SCREENING FOR PROSTATE CANCER: ICD-10-CM

## 2021-09-23 DIAGNOSIS — I10 ESSENTIAL HYPERTENSION WITH GOAL BLOOD PRESSURE LESS THAN 140/90: ICD-10-CM

## 2021-09-23 DIAGNOSIS — K21.9 GASTROESOPHAGEAL REFLUX DISEASE, UNSPECIFIED WHETHER ESOPHAGITIS PRESENT: ICD-10-CM

## 2021-09-23 PROCEDURE — 99396 PREV VISIT EST AGE 40-64: CPT | Performed by: FAMILY MEDICINE

## 2021-09-23 RX ORDER — LISINOPRIL 40 MG/1
40 TABLET ORAL DAILY
Qty: 90 TABLET | Refills: 3 | Status: SHIPPED | OUTPATIENT
Start: 2021-09-23 | End: 2022-11-22

## 2021-09-23 RX ORDER — DOXAZOSIN 8 MG/1
8 TABLET ORAL AT BEDTIME
Qty: 90 TABLET | Refills: 3 | Status: SHIPPED | OUTPATIENT
Start: 2021-09-23 | End: 2022-11-22

## 2021-09-23 RX ORDER — FUROSEMIDE 40 MG
TABLET ORAL
Qty: 90 TABLET | Refills: 3 | Status: SHIPPED | OUTPATIENT
Start: 2021-09-23 | End: 2022-11-22

## 2021-09-23 RX ORDER — SPIRONOLACTONE 25 MG/1
TABLET ORAL
Qty: 45 TABLET | Refills: 0 | Status: SHIPPED | OUTPATIENT
Start: 2021-09-23 | End: 2021-10-14

## 2021-09-23 RX ORDER — OMEPRAZOLE 40 MG/1
CAPSULE, DELAYED RELEASE ORAL
Qty: 90 CAPSULE | Refills: 3 | Status: SHIPPED | OUTPATIENT
Start: 2021-09-23 | End: 2022-11-22

## 2021-09-23 RX ORDER — CARVEDILOL 25 MG/1
TABLET ORAL
Qty: 180 TABLET | Refills: 3 | Status: SHIPPED | OUTPATIENT
Start: 2021-09-23 | End: 2022-11-22

## 2021-09-23 RX ORDER — SUMATRIPTAN 50 MG/1
TABLET, FILM COATED ORAL
Qty: 27 TABLET | Refills: 3 | Status: SHIPPED | OUTPATIENT
Start: 2021-09-23

## 2021-09-23 RX ORDER — ACETAMINOPHEN 160 MG
TABLET,DISINTEGRATING ORAL
Qty: 90 CAPSULE | Refills: 3 | Status: SHIPPED | OUTPATIENT
Start: 2021-09-23 | End: 2022-11-22

## 2021-09-23 ASSESSMENT — ENCOUNTER SYMPTOMS
ABDOMINAL PAIN: 0
DYSURIA: 0
CONSTIPATION: 0
WEAKNESS: 0
SHORTNESS OF BREATH: 0
NERVOUS/ANXIOUS: 0
MYALGIAS: 1
EYE PAIN: 1
HEMATOCHEZIA: 0
HEADACHES: 0
JOINT SWELLING: 1
DIARRHEA: 0
FREQUENCY: 1
ARTHRALGIAS: 1
PALPITATIONS: 0
FEVER: 0
PARESTHESIAS: 0
DIZZINESS: 0
HEMATURIA: 0
NAUSEA: 0
HEARTBURN: 0
CHILLS: 0
COUGH: 0
SORE THROAT: 0

## 2021-09-23 ASSESSMENT — MIFFLIN-ST. JEOR: SCORE: 2096.79

## 2021-09-23 NOTE — PROGRESS NOTES
SUBJECTIVE:   CC: Loy Worthington is an 56 year old male who presents for preventative health visit.       Patient has been advised of split billing requirements and indicates understanding: Yes  Healthy Habits:     Getting at least 3 servings of Calcium per day:  Yes    Bi-annual eye exam:  Yes    Dental care twice a year:  Yes    Sleep apnea or symptoms of sleep apnea:  Sleep apnea    Diet:  Regular (no restrictions)    Frequency of exercise:  None    Taking medications regularly:  No    Medication side effects:  Not applicable    PHQ-2 Total Score: 0    Additional concerns today:  Yes    Pain in joints wrist foot and knee                Today's PHQ-2 Score:   PHQ-2 ( 1999 Pfizer) 9/23/2021   Q1: Little interest or pleasure in doing things 0   Q2: Feeling down, depressed or hopeless 0   PHQ-2 Score 0   Q1: Little interest or pleasure in doing things Not at all   Q2: Feeling down, depressed or hopeless Not at all   PHQ-2 Score 0       Abuse: Current or Past(Physical, Sexual or Emotional)- No  Do you feel safe in your environment? Yes        Social History     Tobacco Use     Smoking status: Never Smoker     Smokeless tobacco: Never Used     Tobacco comment: Lives in smoke free household   Substance Use Topics     Alcohol use: No     If you drink alcohol do you typically have >3 drinks per day or >7 drinks per week? No    Alcohol Use 9/23/2021   Prescreen: >3 drinks/day or >7 drinks/week? No   Prescreen: >3 drinks/day or >7 drinks/week? -       Last PSA:   PSA   Date Value Ref Range Status   03/02/2020 0.78 0 - 4 ug/L Final     Comment:     Assay Method:  Chemiluminescence using Siemens Vista analyzer       Reviewed orders with patient. Reviewed health maintenance and updated orders accordingly -       Reviewed and updated as needed this visit by clinical staff  Tobacco  Allergies               Reviewed and updated as needed this visit by Provider  Tobacco                   Review of Systems   Constitutional:  "Negative for chills and fever.   HENT: Positive for hearing loss. Negative for congestion, ear pain and sore throat.    Eyes: Positive for pain. Negative for visual disturbance.   Respiratory: Negative for cough and shortness of breath.    Cardiovascular: Positive for peripheral edema. Negative for chest pain and palpitations.   Gastrointestinal: Negative for abdominal pain, constipation, diarrhea, heartburn, hematochezia and nausea.   Genitourinary: Positive for frequency. Negative for discharge, dysuria, genital sores, hematuria, impotence and urgency.   Musculoskeletal: Positive for arthralgias, joint swelling and myalgias.   Skin: Negative for rash.   Neurological: Negative for dizziness, weakness, headaches and paresthesias.   Psychiatric/Behavioral: Negative for mood changes. The patient is not nervous/anxious.          OBJECTIVE:   BP (!) 144/85   Pulse 71   Temp 97.9  F (36.6  C) (Tympanic)   Ht 1.778 m (5' 10\")   Wt 126.6 kg (279 lb)   SpO2 98%   BMI 40.03 kg/m      Physical Exam          ASSESSMENT/PLAN:       ICD-10-CM    1. Routine general medical examination at a health care facility  Z00.00 Comprehensive metabolic panel (BMP + Alb, Alk Phos, ALT, AST, Total. Bili, TP)   2. Vitamin D deficiency  E55.9 Comprehensive metabolic panel (BMP + Alb, Alk Phos, ALT, AST, Total. Bili, TP)     Vitamin D Deficiency   3. Paroxysmal atrial fibrillation (H)  I48.0 Comprehensive metabolic panel (BMP + Alb, Alk Phos, ALT, AST, Total. Bili, TP)   4. Hypertension goal BP (blood pressure) < 140/90  I10 Comprehensive metabolic panel (BMP + Alb, Alk Phos, ALT, AST, Total. Bili, TP)   5. Hyperlipidemia LDL goal <130  E78.5 Lipid panel reflex to direct LDL Fasting     Comprehensive metabolic panel (BMP + Alb, Alk Phos, ALT, AST, Total. Bili, TP)   6. High triglycerides  E78.1 Lipid panel reflex to direct LDL Fasting     Comprehensive metabolic panel (BMP + Alb, Alk Phos, ALT, AST, Total. Bili, TP)   7. HDL deficiency  " "E78.6 Lipid panel reflex to direct LDL Fasting     Comprehensive metabolic panel (BMP + Alb, Alk Phos, ALT, AST, Total. Bili, TP)   8. Gastroesophageal reflux disease, unspecified whether esophagitis present  K21.9 Comprehensive metabolic panel (BMP + Alb, Alk Phos, ALT, AST, Total. Bili, TP)   9. Fatty liver  K76.0 Comprehensive metabolic panel (BMP + Alb, Alk Phos, ALT, AST, Total. Bili, TP)   10. Screening for prostate cancer  Z12.5 PSA, screen   11. Essential hypertension with goal blood pressure less than 140/90  I10 carvedilol (COREG) 25 MG tablet     doxazosin (CARDURA) 8 MG tablet     furosemide (LASIX) 40 MG tablet     lisinopril (ZESTRIL) 40 MG tablet     omeprazole (PRILOSEC) 40 MG DR capsule     spironolactone (ALDACTONE) 25 MG tablet   12. Vitamin D deficiency disease  E55.9 Cholecalciferol (VITAMIN D3) 50 MCG (2000 UT) CAPS   13. Pedal edema  R60.0 doxazosin (CARDURA) 8 MG tablet     furosemide (LASIX) 40 MG tablet     omeprazole (PRILOSEC) 40 MG DR capsule   14. Abdominal pain, epigastric  R10.13 furosemide (LASIX) 40 MG tablet     omeprazole (PRILOSEC) 40 MG DR capsule   15. Other migraine without status migrainosus, not intractable  G43.809 SUMAtriptan (IMITREX) 50 MG tablet       Patient has been advised of split billing requirements and indicates understanding: Yes  COUNSELING:   Reviewed preventive health counseling, as reflected in patient instructions       Regular exercise       Healthy diet/nutrition       Vision screening       Aspirin prophylaxis        Family planning       Consider Hep C screening for all patients one time for ages 18-79 years       HIV screeninx in teen years, 1x in adult years, and at intervals if high risk       Colon cancer screening       Prostate cancer screening       Osteoporosis prevention/bone health    Estimated body mass index is 40.03 kg/m  as calculated from the following:    Height as of this encounter: 1.778 m (5' 10\").    Weight as of this encounter: " 126.6 kg (279 lb).     Weight management plan: Discussed healthy diet and exercise guidelines    He reports that he has never smoked. He has never used smokeless tobacco.      Counseling Resources:  ATP IV Guidelines  Pooled Cohorts Equation Calculator  FRAX Risk Assessment  ICSI Preventive Guidelines  Dietary Guidelines for Americans, 2010  USDA's MyPlate  ASA Prophylaxis  Lung CA Screening    Hussein Sanches MD  [facility] [place]  --------------------------------------------------------------------------------------------------------------------------------------  SUBJECTIVE:  Loy Worthington is a 56 year old male who presents to the clinic today for a routine physical exam.    The patient's last physical was  3-2-20    Cholesterol   Date Value Ref Range Status   06/08/2020 172 <200 mg/dL Final   03/02/2020 159 <200 mg/dL Final     HDL Cholesterol   Date Value Ref Range Status   06/08/2020 31 (L) >39 mg/dL Final   03/02/2020 31 (L) >39 mg/dL Final     LDL Cholesterol Calculated   Date Value Ref Range Status   06/08/2020 78 <100 mg/dL Final     Comment:     Desirable:       <100 mg/dl   03/02/2020 80 <100 mg/dL Final     Comment:     Desirable:       <100 mg/dl     Triglycerides   Date Value Ref Range Status   06/08/2020 313 (H) <150 mg/dL Final     Comment:     Borderline high:  150-199 mg/dl  High:             200-499 mg/dl  Very high:       >499 mg/dl  Fasting specimen     03/02/2020 241 (H) <150 mg/dL Final     Comment:     Borderline high:  150-199 mg/dl  High:             200-499 mg/dl  Very high:       >499 mg/dl  Non Fasting       Cholesterol/HDL Ratio   Date Value Ref Range Status   04/08/2015 5.3 (H) 0.0 - 5.0 Final   09/18/2014 5.4 (H) 0.0 - 5.0 Final     The patient's last fasting lipid panel was done 1 years ago and the results are listed above.      The 10-year ASCVD risk score (Rafaeldesiree TALLEY Jr., et al., 2013) is: 12%    Values used to calculate the score:      Age: 56 years      Sex: Male       Is Non- : No      Diabetic: No      Tobacco smoker: No      Systolic Blood Pressure: 144 mmHg      Is BP treated: Yes      HDL Cholesterol: 31 mg/dL      Total Cholesterol: 172 mg/dL      Risk Enhancers:  Family history of premature ASCVD- No  LDL >159- Unknown  Chronic kidney disease- Unknown  Metabolic Syndrome- No  Premature menopause- No  Inflammatory conditions (RA, psoriasis, HIV)- No  SE  Ancestry- No  Triglycerides >174- Unknown      The patient reports that he has been treated for high blood pressure.    The patient reports that he does not take a daily aspirin.    Lab Results   Component Value Date    HCVAB Negative 03/18/2014     The patient reports that he has been screened for Hepatitis C    (Screen all baby boomers once per CDC-- the generation born from 1945 through 1965 and per USPTF screen age 19 to 79 especially younger people who have used IV drugs)  He would not like to have an Hepatitis C test today    Lab Results   Component Value Date    HIAGAB Nonreactive 01/28/2019     The patient reports that he has been screened for HIV   (Screen all 15 to 64 years old)  He would not like to have an HIV test today      Immunization History   Administered Date(s) Administered     HepB 10/15/2001, 11/15/2001, 11/28/2012     Influenza (IIV3) PF 11/10/2008     Mantoux Tuberculin Skin Test 05/15/1986, 10/15/2001     TD (ADULT, 7+) 09/25/2001, 11/15/2001     TDAP Vaccine (Adacel) 09/10/2012     The patient's believes that his last tetanus shot was given 9 year(s) ago.   The patient believes that he has not had a Shingrix in the past  The patient believes that he has not had a PPSV23 in the past.  The patient believes that he has not had a PCV13 in the past.  The patient believes that he has not had a seasonal flu vaccination this fall or winter.  The patient would like to have a no vaccinations today      No results found for this or any previous visit.]   The patient denies a  family history of colon cancer.  The patient reports that he has had a colonoscopy. His  last colonoscopy was in 2018 and he  report that is was normal. The patient was told to have this repeated in 5 years.    He is not interested in a vasectomy in the near future.  The patient denies a family history of diabetes.  The patient reports a family history of prostate cancer. After discussing the pros and cons of checking a PSA he  Does want to have this test drawn today.   The patient reports that he eats or drinks 2 servings of dairy products per day.   The patient reports that he has dental appointments approximately every 12 months.  The patient reports that he  has an eye examination approximately every 1.0 year(s).    Do you currently smoke? No  How many years have you smoked? 0   How many packs per day did you smoke on average? N/A  (if more than 30 pack year history and the patient is age 55-80 consider ordering an annual low dose radiation lung CT to screen for cancer)  (Do not order if patient has quit more than 15 years ago or has a health condition that limits life expectancy or could not tolerate curative lung surgery)  Are you interested having a lung CT to screen for lung cancer? N/A    If the patient has smoked more that 100 cigarettes, has the patient had an imaging study (US or CT) for an AAA between the ages of 65 and 75? N/A          Patient Active Problem List   Diagnosis     Brain injury (H)     Renal mass     Migraine headache     CARDIOVASCULAR SCREENING; LDL GOAL LESS THAN 130     Hypertension goal BP (blood pressure) < 140/90     Bilateral corneal scars     High triglycerides     Familial hypoalphalipoproteinemia     Vitamin D deficiency     Hyperlipidemia LDL goal <130     GERD (gastroesophageal reflux disease)     Tubular adenoma of colon     Paroxysmal atrial fibrillation (H)     Hematospermia     S/P laparoscopic cholecystectomy     HDL deficiency     Diverticulosis of large intestine      Obesity (BMI 35.0-39.9) with comorbidity (H)     Post concussion syndrome     Fatty liver       Past Surgical History:   Procedure Laterality Date     CHOLECYSTECTOMY       ENDOSCOPY  12/12/2005    upper GI endoscopy     ESOPHAGOSCOPY, GASTROSCOPY, DUODENOSCOPY (EGD), COMBINED N/A 1/25/2016    Procedure: COMBINED ESOPHAGOSCOPY, GASTROSCOPY, DUODENOSCOPY (EGD);  Surgeon: David Campos MD;  Location: [facility] OR     ESOPHAGOSCOPY, GASTROSCOPY, DUODENOSCOPY (EGD), COMBINED N/A 1/25/2016    Procedure: COMBINED ESOPHAGOSCOPY, GASTROSCOPY, DUODENOSCOPY (EGD), BIOPSY SINGLE OR MULTIPLE;  Surgeon: David Campos MD;  Location: [facility] OR     H ABLATION ATRIAL FLUTTER  May 2016    Dr Cardenas at [facility]     NASAL/SINUS POLYPECTOMY      Nasal-Sinus Polypectomy     SURGICAL HISTORY OF -       cale holes, subdural due to fall     SURGICAL HISTORY OF -       ?tympanoplasty/mastoid with brain trauma       Family History   Problem Relation Age of Onset     Hypertension Father      Hypertension Brother      Macular Degeneration No family hx of      Glaucoma No family hx of      Thyroid Disease No family hx of      Cancer No family hx of      Diabetes No family hx of      Cerebrovascular Disease No family hx of        Social History     Socioeconomic History     Marital status:      Spouse name: Not on file     Number of children: Not on file     Years of education: Not on file     Highest education level: Not on file   Occupational History     Not on file   Tobacco Use     Smoking status: Never Smoker     Smokeless tobacco: Never Used     Tobacco comment: Lives in smoke free household   Substance and Sexual Activity     Alcohol use: No     Drug use: No     Sexual activity: Yes     Partners: Female   Other Topics Concern     Parent/sibling w/ CABG, MI or angioplasty before 65F 55M? Not Asked   Social History Narrative     Not on file     Social Determinants of Health     Financial Resource Strain:      Difficulty of  Paying Living Expenses:    Food Insecurity:      Worried About Running Out of Food in the Last Year:      Ran Out of Food in the Last Year:    Transportation Needs:      Lack of Transportation (Medical):      Lack of Transportation (Non-Medical):    Physical Activity:      Days of Exercise per Week:      Minutes of Exercise per Session:    Stress:      Feeling of Stress :    Social Connections:      Frequency of Communication with Friends and Family:      Frequency of Social Gatherings with Friends and Family:      Attends Congregation Services:      Active Member of Clubs or Organizations:      Attends Club or Organization Meetings:      Marital Status:    Intimate Partner Violence:      Fear of Current or Ex-Partner:      Emotionally Abused:      Physically Abused:      Sexually Abused:        Current Outpatient Medications   Medication Sig Dispense Refill     carvedilol (COREG) 25 MG tablet TAKE 1 TABLET(25 MG) BY MOUTH TWICE DAILY 180 tablet 0     Cholecalciferol (VITAMIN D3) 50 MCG (2000 UT) CAPS TAKE 1 CAPSULE BY MOUTH EVERY DAY 90 capsule 0     doxazosin (CARDURA) 8 MG tablet TAKE 1 TABLET BY MOUTH EVERY NIGHT AT BEDTIME 90 tablet 3     furosemide (LASIX) 40 MG tablet TAKE 1 TABLET(40 MG) BY MOUTH EVERY MORNING 90 tablet 3     lisinopril (ZESTRIL) 40 MG tablet TAKE 1 TABLET(40 MG) BY MOUTH DAILY 90 tablet 0     omeprazole (PRILOSEC) 40 MG DR capsule TAKE 1 CAPSULE(40 MG) BY MOUTH DAILY 30 TO 60 MINUTES BEFORE A MEAL 90 capsule 0     rivaroxaban ANTICOAGULANT (XARELTO) 20 MG TABS tablet Take 1 tablet (20 mg) by mouth daily (with dinner) 90 tablet 1     spironolactone (ALDACTONE) 25 MG tablet TAKE ONE-HALF TABLET( 12.5MG) BY MOUTH ONCE DAILY 45 tablet 0     SUMAtriptan (IMITREX) 50 MG tablet TAKE 1 TABLET BY MOUTH AT ONSET OF HEADACHE AND MAY REPEAT IN 2 HOURS IF NEEDED, MAX 2 TABLETS DAILY 27 tablet 0       Review Of Systems    Genitourinary: positive for negative and nocturia x 1    PHYSICAL EXAMINATION:  Blood  "pressure (!) 144/85, pulse 71, temperature 97.9  F (36.6  C), temperature source Tympanic, height 1.778 m (5' 10\"), weight 126.6 kg (279 lb), SpO2 98 %.  General appearance - healthy, alert and no distress  Skin - Skin color, texture, turgor normal. No rashes or lesions.  Head - Normocephalic. No masses, lesions, tenderness or abnormalities  Eyes - conjunctivae/corneas clear. PERRL, EOM's intact. Fundi benign  Ears - External ears normal. Canals clear. TM's normal.  Nose/Sinuses - Nares normal. Septum midline. Mucosa normal. No drainage or sinus tenderness.  Oropharynx - Lips, mucosa, and tongue normal. Teeth and gums normal.  Neck - Neck supple. No adenopathy. Thyroid symmetric, normal size,  Lungs - Percussion normal. Good diaphragmatic excursion. Lungs clear  Heart - PMI normal. No lifts, heaves, or thrills. RRR. No murmurs, clicks gallops or rub  Abdomen - Abdomen soft, non-tender. BS normal. No masses, organomegaly  Extremities - Extremities normal. No deformities, edema, or skin discoloration.  Musculoskeletal - Spine ROM normal. Muscular strength intact.  Peripheral pulses - radial=4/4, femoral=4/4, popliteal=4/4, dorsalis pedis=4/4,  Neuro - Gait normal. Reflexes normal and symmetric. Sensation grossly WNL.  Genitalia - Penis normal. No urethral discharge. Scrotum normal to palpation. No hernia.  Rectal - Rectal negative. Prostate palpation negative.  No rectal masses or abnormalities      Orders Only on 06/08/2020   Component Date Value Ref Range Status     Cholesterol 06/08/2020 172  <200 mg/dL Final     Triglycerides 06/08/2020 313* <150 mg/dL Final    Comment: Borderline high:  150-199 mg/dl  High:             200-499 mg/dl  Very high:       >499 mg/dl  Fasting specimen       HDL Cholesterol 06/08/2020 31* >39 mg/dL Final     LDL Cholesterol Calculated 06/08/2020 78  <100 mg/dL Final    Desirable:       <100 mg/dl     Non HDL Cholesterol 06/08/2020 141* <130 mg/dL Final    Comment: Above Desirable:  " 130-159 mg/dl  Borderline high:  160-189 mg/dl  High:             190-219 mg/dl  Very high:       >219 mg/dl         ASSESSMENT:    ICD-10-CM    1. Routine general medical examination at a health care facility  Z00.00    2. Vitamin D deficiency  E55.9    3. Paroxysmal atrial fibrillation (H)  I48.0    4. Hypertension goal BP (blood pressure) < 140/90  I10    5. Hyperlipidemia LDL goal <130  E78.5    6. High triglycerides  E78.1    7. HDL deficiency  E78.6    8. Gastroesophageal reflux disease, unspecified whether esophagitis present  K21.9    9. Fatty liver  K76.0        Well-Adult Physical Exam.  Health Maintenance Due   Topic Date Due     ANNUAL REVIEW OF HM ORDERS  Never done     COVID-19 Vaccine (1) Never done     ZOSTER IMMUNIZATION (1 of 2) Never done     INFLUENZA VACCINE (1) 09/01/2021     Health Maintenance   Topic Date Due     ANNUAL REVIEW OF HM ORDERS  Never done     COVID-19 Vaccine (1) Never done     ZOSTER IMMUNIZATION (1 of 2) Never done     INFLUENZA VACCINE (1) 09/01/2021     DTAP/TDAP/TD IMMUNIZATION (4 - Td or Tdap) 09/10/2022     PREVENTIVE CARE VISIT  09/23/2022     COLORECTAL CANCER SCREENING  09/14/2023     ADVANCE CARE PLANNING  02/20/2024     LIPID  06/08/2025     HEPATITIS C SCREENING  Completed     HIV SCREENING  Completed     MIGRAINE ACTION PLAN  Completed     PHQ-2  Completed     Pneumococcal Vaccine: Pediatrics (0 to 5 Years) and At-Risk Patients (6 to 64 Years)  Aged Out     IPV IMMUNIZATION  Aged Out     MENINGITIS IMMUNIZATION  Aged Out     HEPATITIS B IMMUNIZATION  Aged Out         HEALTH CARE MAINTENENCE: The recommended screening tests and vaccinatons for this patient have been discussed as above.  The appropriate tests and vaccinations  have been ordered or declined by the patient. Please see the orders in EPIC.The patient specifically declines: influenza, Shingrix and COVID    Immunization Status:  up to date and documented except for influenza, Shingrix and COVID    Patient  Active Problem List   Diagnosis     Brain injury (H)     Renal mass     Migraine headache     CARDIOVASCULAR SCREENING; LDL GOAL LESS THAN 130     Hypertension goal BP (blood pressure) < 140/90     Bilateral corneal scars     High triglycerides     Familial hypoalphalipoproteinemia     Vitamin D deficiency     Hyperlipidemia LDL goal <130     GERD (gastroesophageal reflux disease)     Tubular adenoma of colon     Paroxysmal atrial fibrillation (H)     Hematospermia     S/P laparoscopic cholecystectomy     HDL deficiency     Diverticulosis of large intestine     Obesity (BMI 35.0-39.9) with comorbidity (H)     Post concussion syndrome     Fatty liver        ATP III Guidelines  ICSI Preventive Guidelines    PLAN:   Restart the spironolactone and follow-up with me in 3 to 4 weeks for a blood pressure recheck.  The patient will make a future lab appointment for all bloodwork as they are not fasting today  Check a fasting lipid profile  Shingrix recommended  Flu shot recommended  COVID vaccine recommended  Check a fasting glucose  Check a PSA  Discussed calcium intake, vitamins and supplements. Recommended 1000 mg of calcium daily  Weight loss through diet and exercise was recommended  Sunscreen use was recommended especially in the area of tatoos  Refills on chronic medication given  Recommended dental exams every 6 months  Recommended eye exam every 1-2 years  Follow up in 1 year for the next preventative medical visit    The patients chronic medication was filled for 12  months.    Body mass index is 40.03 kg/m .

## 2021-09-23 NOTE — PATIENT INSTRUCTIONS
Preventive Health Recommendations  Male Ages 50 - 64    Yearly exam:             See your health care provider every year in order to  o   Review health changes.   o   Discuss preventive care.    o   Review your medicines if your doctor has prescribed any.     Have a cholesterol test every 5 years, or more frequently if you are at risk for high cholesterol/heart disease.     Have a diabetes test (fasting glucose) every three years. If you are at risk for diabetes, you should have this test more often.     Have a colonoscopy at age 50, or have a yearly FIT test (stool test). These exams will check for colon cancer.      Talk with your health care provider about whether or not a prostate cancer screening test (PSA) is right for you.    You should be tested each year for STDs (sexually transmitted diseases), if you re at risk.     Shots: Get a flu shot each year. Get a tetanus shot every 10 years.     Nutrition:    Eat at least 5 servings of fruits and vegetables daily.     Eat whole-grain bread, whole-wheat pasta and brown rice instead of white grains and rice.     Get adequate Calcium and Vitamin D.     Lifestyle    Exercise for at least 150 minutes a week (30 minutes a day, 5 days a week). This will help you control your weight and prevent disease.     Limit alcohol to one drink per day.     No smoking.     Wear sunscreen to prevent skin cancer.     See your dentist every six months for an exam and cleaning.     See your eye doctor every 1 to 2 years.      Please schedule a future laboratory appointment(s) and be sure to have fasted for 10 hours prior to arrival      Restart the spironolactone as soon as possible      Patient Education     Preventing Osteoporosis: Meeting Your Calcium Needs    Your body needs calcium to build and repair bones. But it can't make calcium on its own. That's why it's important to eat calcium-rich foods. Some foods are naturally rich in calcium. Others have calcium added (fortified).  It's best to get calcium from the foods you eat. But if you can't get enough, you may want to take calcium supplements. To meet your daily calcium needs, try the foods listed below.  Dairy Fish & beans Other sources   Source   Calcium (mg) per serving   Source   Calcium (mg) per serving   Source   Calcium (mg) per serving   Low-fat yogurt, plain   415 mg/8 oz.   Sardines, Atlantic, canned, with bones   351 mg/3 oz.   Oatmeal, instant, fortified   215 mg/1 cup   Nonfat milk   302 mg/1 cup   Chandler, sockeye, canned, with bones   239 mg/3 oz.   Tofu made with calcium sulfate   204 mg/3 oz.   Low-fat milk   297 mg/1 cup   Soybeans, fresh, boiled   131 mg/1/2 cup   Collards   179 mg/1/2 cup   Swiss cheese   272 mg/1 oz.   White beans, cooked   81 mg/1/2 cup   English muffin, whole wheat   175 mg/1 muffin   Cheddar cheese   205 mg/1 oz.   Navy beans, cooked   79 mg/1/2 cup   Kale   90 mg/1/2 cup   Ice cream strawberry   79 mg/1/2 cup           Orange, navel   56 mg/1 medium   Note: Calcium levels may vary depending on brand and size.  Daily calcium needs  14 to 18 years old: 1,300 mg  19 to 30 years old: 1,000 mg  31 to 50 years old: 1,000 mg  51 to 70 years old, women: 1,200 mg  51 to 70 years old, men: 1,000 mg  Pregnant or nursin to 18 years old: 1,300 mg, 19 to 50 years old: 1,000 mg  Older than 70 (women and men): 1,200 mg   [employer] last reviewed this educational content on 2018-2021 [employer]. All rights reserved. This information is not intended as a substitute for professional medical care. Always follow your healthcare professional's instructions.

## 2021-09-24 ENCOUNTER — LAB (OUTPATIENT)
Dept: LAB | Facility: CLINIC | Age: 56
End: 2021-09-24
Payer: COMMERCIAL

## 2021-09-24 DIAGNOSIS — E78.5 HYPERLIPIDEMIA LDL GOAL <130: ICD-10-CM

## 2021-09-24 DIAGNOSIS — I48.0 PAROXYSMAL ATRIAL FIBRILLATION (H): ICD-10-CM

## 2021-09-24 DIAGNOSIS — I10 HYPERTENSION GOAL BP (BLOOD PRESSURE) < 140/90: ICD-10-CM

## 2021-09-24 DIAGNOSIS — E55.9 VITAMIN D DEFICIENCY: ICD-10-CM

## 2021-09-24 DIAGNOSIS — E78.6 HDL DEFICIENCY: ICD-10-CM

## 2021-09-24 DIAGNOSIS — K76.0 FATTY LIVER: ICD-10-CM

## 2021-09-24 DIAGNOSIS — E78.1 HIGH TRIGLYCERIDES: ICD-10-CM

## 2021-09-24 DIAGNOSIS — K21.9 GASTROESOPHAGEAL REFLUX DISEASE, UNSPECIFIED WHETHER ESOPHAGITIS PRESENT: ICD-10-CM

## 2021-09-24 DIAGNOSIS — Z12.5 SCREENING FOR PROSTATE CANCER: ICD-10-CM

## 2021-09-24 DIAGNOSIS — Z00.00 ROUTINE GENERAL MEDICAL EXAMINATION AT A HEALTH CARE FACILITY: ICD-10-CM

## 2021-09-24 LAB
ALBUMIN SERPL-MCNC: 4 G/DL (ref 3.4–5)
ALP SERPL-CCNC: 63 U/L (ref 40–150)
ALT SERPL W P-5'-P-CCNC: 79 U/L (ref 0–70)
ANION GAP SERPL CALCULATED.3IONS-SCNC: 4 MMOL/L (ref 3–14)
AST SERPL W P-5'-P-CCNC: 38 U/L (ref 0–45)
BILIRUB SERPL-MCNC: 0.5 MG/DL (ref 0.2–1.3)
BUN SERPL-MCNC: 16 MG/DL (ref 7–30)
CALCIUM SERPL-MCNC: 9.1 MG/DL (ref 8.5–10.1)
CHLORIDE BLD-SCNC: 109 MMOL/L (ref 94–109)
CHOLEST SERPL-MCNC: 182 MG/DL
CO2 SERPL-SCNC: 30 MMOL/L (ref 20–32)
CREAT SERPL-MCNC: 0.84 MG/DL (ref 0.66–1.25)
DEPRECATED CALCIDIOL+CALCIFEROL SERPL-MC: 38 UG/L (ref 20–75)
FASTING STATUS PATIENT QL REPORTED: YES
GFR SERPL CREATININE-BSD FRML MDRD: >90 ML/MIN/1.73M2
GLUCOSE BLD-MCNC: 109 MG/DL (ref 70–99)
HDLC SERPL-MCNC: 34 MG/DL
LDLC SERPL CALC-MCNC: 114 MG/DL
NONHDLC SERPL-MCNC: 148 MG/DL
POTASSIUM BLD-SCNC: 4.4 MMOL/L (ref 3.4–5.3)
PROT SERPL-MCNC: 7.4 G/DL (ref 6.8–8.8)
PSA SERPL-MCNC: 0.74 UG/L (ref 0–4)
SODIUM SERPL-SCNC: 143 MMOL/L (ref 133–144)
TRIGL SERPL-MCNC: 169 MG/DL

## 2021-09-24 PROCEDURE — 82306 VITAMIN D 25 HYDROXY: CPT

## 2021-09-24 PROCEDURE — 80053 COMPREHEN METABOLIC PANEL: CPT

## 2021-09-24 PROCEDURE — 80061 LIPID PANEL: CPT

## 2021-09-24 PROCEDURE — G0103 PSA SCREENING: HCPCS

## 2021-09-24 PROCEDURE — 36415 COLL VENOUS BLD VENIPUNCTURE: CPT

## 2021-09-27 PROBLEM — R73.02 IGT (IMPAIRED GLUCOSE TOLERANCE): Status: ACTIVE | Noted: 2021-09-27

## 2021-10-14 ENCOUNTER — ANCILLARY PROCEDURE (OUTPATIENT)
Dept: GENERAL RADIOLOGY | Facility: CLINIC | Age: 56
End: 2021-10-14
Attending: FAMILY MEDICINE
Payer: COMMERCIAL

## 2021-10-14 ENCOUNTER — OFFICE VISIT (OUTPATIENT)
Dept: FAMILY MEDICINE | Facility: CLINIC | Age: 56
End: 2021-10-14
Payer: COMMERCIAL

## 2021-10-14 VITALS
WEIGHT: 274 LBS | OXYGEN SATURATION: 98 % | SYSTOLIC BLOOD PRESSURE: 133 MMHG | HEART RATE: 66 BPM | BODY MASS INDEX: 39.31 KG/M2 | TEMPERATURE: 97.1 F | DIASTOLIC BLOOD PRESSURE: 84 MMHG

## 2021-10-14 DIAGNOSIS — M79.672 LEFT FOOT PAIN: ICD-10-CM

## 2021-10-14 DIAGNOSIS — G89.29 CHRONIC PAIN OF BOTH KNEES: Primary | ICD-10-CM

## 2021-10-14 DIAGNOSIS — M25.561 CHRONIC PAIN OF BOTH KNEES: Primary | ICD-10-CM

## 2021-10-14 DIAGNOSIS — M25.562 CHRONIC PAIN OF BOTH KNEES: ICD-10-CM

## 2021-10-14 DIAGNOSIS — G89.29 CHRONIC PAIN OF BOTH KNEES: ICD-10-CM

## 2021-10-14 DIAGNOSIS — M25.561 CHRONIC PAIN OF BOTH KNEES: ICD-10-CM

## 2021-10-14 DIAGNOSIS — I10 ESSENTIAL HYPERTENSION WITH GOAL BLOOD PRESSURE LESS THAN 140/90: ICD-10-CM

## 2021-10-14 DIAGNOSIS — M25.562 CHRONIC PAIN OF BOTH KNEES: Primary | ICD-10-CM

## 2021-10-14 PROCEDURE — 99214 OFFICE O/P EST MOD 30 MIN: CPT | Performed by: FAMILY MEDICINE

## 2021-10-14 PROCEDURE — 73565 X-RAY EXAM OF KNEES: CPT | Performed by: RADIOLOGY

## 2021-10-14 PROCEDURE — 73630 X-RAY EXAM OF FOOT: CPT | Mod: LT | Performed by: RADIOLOGY

## 2021-10-14 RX ORDER — CARVEDILOL 25 MG/1
TABLET ORAL
Qty: 180 TABLET | Refills: 3 | Status: CANCELLED | OUTPATIENT
Start: 2021-10-14

## 2021-10-14 RX ORDER — SPIRONOLACTONE 25 MG/1
TABLET ORAL
Qty: 45 TABLET | Refills: 3 | Status: SHIPPED | OUTPATIENT
Start: 2021-10-14 | End: 2022-11-22

## 2021-10-14 ASSESSMENT — PAIN SCALES - GENERAL: PAINLEVEL: NO PAIN (0)
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
## 2022-10-18 ENCOUNTER — OFFICE VISIT (OUTPATIENT)
Dept: FAMILY MEDICINE | Facility: CLINIC | Age: 57
End: 2022-10-18
Payer: COMMERCIAL

## 2022-10-18 VITALS
DIASTOLIC BLOOD PRESSURE: 92 MMHG | HEIGHT: 70 IN | WEIGHT: 279.2 LBS | OXYGEN SATURATION: 97 % | TEMPERATURE: 97 F | RESPIRATION RATE: 20 BRPM | SYSTOLIC BLOOD PRESSURE: 134 MMHG | HEART RATE: 66 BPM | BODY MASS INDEX: 39.97 KG/M2

## 2022-10-18 DIAGNOSIS — R41.3 MEMORY LOSS: Primary | ICD-10-CM

## 2022-10-18 DIAGNOSIS — T48.5X5A RHINITIS MEDICAMENTOSA: ICD-10-CM

## 2022-10-18 DIAGNOSIS — J31.0 RHINITIS MEDICAMENTOSA: ICD-10-CM

## 2022-10-18 DIAGNOSIS — N28.89 RIGHT RENAL MASS: ICD-10-CM

## 2022-10-18 DIAGNOSIS — R91.8 PULMONARY NODULES: ICD-10-CM

## 2022-10-18 PROCEDURE — 99214 OFFICE O/P EST MOD 30 MIN: CPT | Performed by: FAMILY MEDICINE

## 2022-10-18 RX ORDER — ROSUVASTATIN CALCIUM 20 MG/1
TABLET, COATED ORAL
COMMUNITY
Start: 2022-10-08

## 2022-10-18 RX ORDER — FLUTICASONE PROPIONATE 50 MCG
SPRAY, SUSPENSION (ML) NASAL
COMMUNITY
Start: 2022-10-08

## 2022-10-18 ASSESSMENT — PAIN SCALES - GENERAL: PAINLEVEL: NO PAIN (0)

## 2022-11-19 DIAGNOSIS — R10.13 ABDOMINAL PAIN, EPIGASTRIC: ICD-10-CM

## 2022-11-19 DIAGNOSIS — E55.9 VITAMIN D DEFICIENCY DISEASE: ICD-10-CM

## 2022-11-19 DIAGNOSIS — I10 ESSENTIAL HYPERTENSION WITH GOAL BLOOD PRESSURE LESS THAN 140/90: ICD-10-CM

## 2022-11-19 DIAGNOSIS — R60.0 PEDAL EDEMA: ICD-10-CM

## 2022-11-22 RX ORDER — CARVEDILOL 25 MG/1
TABLET ORAL
Qty: 180 TABLET | Refills: 1 | Status: SHIPPED | OUTPATIENT
Start: 2022-11-22

## 2022-11-22 RX ORDER — OMEPRAZOLE 40 MG/1
CAPSULE, DELAYED RELEASE ORAL
Qty: 90 CAPSULE | Refills: 1 | Status: SHIPPED | OUTPATIENT
Start: 2022-11-22

## 2022-11-22 RX ORDER — FUROSEMIDE 40 MG
TABLET ORAL
Qty: 90 TABLET | Refills: 1 | Status: SHIPPED | OUTPATIENT
Start: 2022-11-22

## 2022-11-22 RX ORDER — SPIRONOLACTONE 25 MG/1
TABLET ORAL
Qty: 45 TABLET | Refills: 1 | Status: SHIPPED | OUTPATIENT
Start: 2022-11-22

## 2022-11-22 RX ORDER — DOXAZOSIN 8 MG/1
TABLET ORAL
Qty: 90 TABLET | Refills: 1 | Status: SHIPPED | OUTPATIENT
Start: 2022-11-22

## 2022-11-22 RX ORDER — ACETAMINOPHEN 160 MG
TABLET,DISINTEGRATING ORAL
Qty: 90 CAPSULE | Refills: 3 | Status: SHIPPED | OUTPATIENT
Start: 2022-11-22 | End: 2023-12-04

## 2022-11-22 RX ORDER — LISINOPRIL 40 MG/1
TABLET ORAL
Qty: 90 TABLET | Refills: 1 | Status: SHIPPED | OUTPATIENT
Start: 2022-11-22

## 2022-12-30 NOTE — TELEPHONE ENCOUNTER
RECORDS RECEIVED FROM: Internal   REASON FOR VISIT: Memory loss [R41.3]   Date of Appt: 03/29/2023   NOTES (FOR ALL VISITS) STATUS DETAILS   OFFICE NOTE from referring provider Internal 10/18/2022 Dr Evans [facility]    OFFICE NOTE from other specialist N/A    DISCHARGE SUMMARY from hospital N/A    DISCHARGE REPORT from the ER N/A    OPERATIVE REPORT N/A    MEDICATION LIST N/A    IMAGING  (FOR ALL VISITS)     EMG N/A    EEG N/A    LUMBAR PUNCTURE N/A    BRENDA SCAN N/A    ULTRASOUND (CAROTID BILAT) *VASCULAR* N/A    MRI (HEAD, NECK, SPINE) Internal 02/25/2019 brain    CT (HEAD, NECK, SPINE) N/A

## 2023-03-29 ENCOUNTER — PRE VISIT (OUTPATIENT)
Dept: NEUROLOGY | Facility: CLINIC | Age: 58
End: 2023-03-29

## 2023-05-30 ENCOUNTER — TRANSFERRED RECORDS (OUTPATIENT)
Dept: HEALTH INFORMATION MANAGEMENT | Facility: CLINIC | Age: 58
End: 2023-05-30
Payer: COMMERCIAL

## 2023-06-02 ENCOUNTER — TRANSFERRED RECORDS (OUTPATIENT)
Dept: HEALTH INFORMATION MANAGEMENT | Facility: CLINIC | Age: 58
End: 2023-06-02
Payer: COMMERCIAL

## 2023-10-19 ENCOUNTER — TRANSCRIBE ORDERS (OUTPATIENT)
Dept: OTHER | Age: 58
End: 2023-10-19

## 2023-12-03 DIAGNOSIS — E55.9 VITAMIN D DEFICIENCY DISEASE: ICD-10-CM

## 2023-12-04 RX ORDER — ACETAMINOPHEN 160 MG
TABLET,DISINTEGRATING ORAL
Qty: 30 CAPSULE | Refills: 0 | Status: SHIPPED | OUTPATIENT
Start: 2023-12-04

## 2023-12-19 ENCOUNTER — TRANSCRIBE ORDERS (OUTPATIENT)
Dept: OTHER | Age: 58
End: 2023-12-19

## 2023-12-19 DIAGNOSIS — Z98.890 S/P RIGHT ROTATOR CUFF REPAIR: ICD-10-CM

## 2023-12-19 DIAGNOSIS — Z47.89 ORTHOPEDIC AFTERCARE: Primary | ICD-10-CM

## 2024-03-11 ENCOUNTER — THERAPY VISIT (OUTPATIENT)
Dept: PHYSICAL THERAPY | Facility: CLINIC | Age: 59
End: 2024-03-11
Attending: ORTHOPAEDIC SURGERY
Payer: COMMERCIAL

## 2024-03-11 DIAGNOSIS — Z98.890 STATUS POST RIGHT ROTATOR CUFF REPAIR: ICD-10-CM

## 2024-03-11 DIAGNOSIS — M25.511 RIGHT SHOULDER PAIN: Primary | ICD-10-CM

## 2024-03-11 PROCEDURE — 97161 PT EVAL LOW COMPLEX 20 MIN: CPT | Mod: GP | Performed by: PHYSICAL THERAPIST

## 2024-03-11 PROCEDURE — 97110 THERAPEUTIC EXERCISES: CPT | Mod: GP | Performed by: PHYSICAL THERAPIST
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
## 2024-03-25 ENCOUNTER — THERAPY VISIT (OUTPATIENT)
Dept: PHYSICAL THERAPY | Facility: CLINIC | Age: 59
End: 2024-03-25
Payer: COMMERCIAL

## 2024-03-25 DIAGNOSIS — M25.511 RIGHT SHOULDER PAIN: Primary | ICD-10-CM

## 2024-03-25 PROCEDURE — 97110 THERAPEUTIC EXERCISES: CPT | Mod: GP | Performed by: PHYSICAL THERAPIST

## 2024-03-29 NOTE — PROGRESS NOTES
I have reviewed the schedule of future appointments and this patient has an appointment scheduled for 6-7-2018.    Visit date not found  
26.3

## 2024-04-08 ENCOUNTER — THERAPY VISIT (OUTPATIENT)
Dept: PHYSICAL THERAPY | Facility: CLINIC | Age: 59
End: 2024-04-08
Payer: COMMERCIAL

## 2024-04-08 DIAGNOSIS — M25.511 RIGHT SHOULDER PAIN: Primary | ICD-10-CM

## 2024-04-08 DIAGNOSIS — Z98.890 STATUS POST RIGHT ROTATOR CUFF REPAIR: ICD-10-CM

## 2024-04-08 PROCEDURE — 97110 THERAPEUTIC EXERCISES: CPT | Mod: GP | Performed by: PHYSICAL THERAPIST

## 2024-04-22 ENCOUNTER — THERAPY VISIT (OUTPATIENT)
Dept: PHYSICAL THERAPY | Facility: CLINIC | Age: 59
End: 2024-04-22
Payer: COMMERCIAL

## 2024-04-22 DIAGNOSIS — M25.511 RIGHT SHOULDER PAIN: Primary | ICD-10-CM

## 2024-04-22 DIAGNOSIS — Z98.890 STATUS POST RIGHT ROTATOR CUFF REPAIR: ICD-10-CM

## 2024-04-22 PROCEDURE — 97110 THERAPEUTIC EXERCISES: CPT | Mod: GP | Performed by: PHYSICAL THERAPIST

## 2024-04-23 NOTE — PROGRESS NOTES
PLAN  Continue therapy per current plan of care.     04/22/24 0500   Appointment Info   Signing clinician's name / credentials Miguel Garber PT ATC Cert MDT   Total/Authorized Visits 12   Visits Used 4   Medical Diagnosis R RCR , SAD, Extensive Debridement   PT Tx Diagnosis R RCR, Scap dyskinesia, SAD, Pain   Precautions/Limitations [facility] post op Massive RCR Program   Other pertinent information 4sling x 12 weeks   Quick Adds Certification   Progress Note/Certification   Start of Care Date 03/11/24   Onset of illness/injury or Date of Surgery 12/07/23   Therapy Frequency 1x/week   Predicted Duration 12 weeks   Certification date from 03/11/24   Certification date to 06/03/24   Progress Note Due Date 06/04/24       Present No  (Guinean)   PT Goal 1   Goal Identifier reaching   Goal Description ability to actively reach above shoulder to 150 degrees   Rationale to maximize safety and independence with performance of ADLs and functional tasks;to maximize safety and independence within the home;to maximize safety and independence with self cares   Goal Progress AROM reach   Target Date 06/03/24   Subjective Report   Subjective Report Loy is at 13 weeks post op care , Massive RCR program. Today is visit #4.. SPADI 46/100. Improved from 83/100 initially. Patient reports of gradually restoring function and ability to perform self care and house chores. Following [facility] post op Massive RCR Program . Protecting subscap   Objective Measures   Objective Measures Objective Measure 1;Objective Measure 2;Objective Measure 3;Objective Measure 4   Objective Measure 2   Objective Measure SPADI   Details 83/100   Objective Measure 3   Objective Measure shoulder ROM   Details Assisted Flexion 145, ER 40, IR 80   Objective Measure 4   Objective Measure Scap control   Details AROM scapular hike   PT Modalities   PT Modalities Cryotherapy   Cryotherapy   Patient Response/Progress will do at home   Treatment  Interventions (PT)   Interventions Therapeutic Procedure/Exercise   Therapeutic Procedure/Exercise   Therapeutic Procedures: strength, endurance, ROM, flexibility minutes (27515) 37   Therapeutic Procedures Ther Proc 2;Ther Proc 3;Ther Proc 4;Ther Proc 5   Ther Proc 1 standing pendulum   Ther Proc 1 - Details 2x/day x 20 each   Ther Proc 2 standing ER to neutral   Ther Proc 2 - Details 2x/day x 20-30   Ther Proc 3 standing wall slides   Ther Proc 3 - Details 2x/day x 10   Ther Proc 4 supine assisted ceiling punch   Ther Proc 4 - Details 1x/day  2x 20   Ther Proc 5 supine reverse pendulum   Ther Proc 5 - Details 2 x 20 no weight   Skilled Intervention manual and verbal   Patient Response/Progress slight discomfort, no worse   Education   Learner/Method Patient;Demonstration;Pictures/Video;No Barriers to Learning   Plan   Home program [facility] print outs   Plan for next session Adduction isometrci and scap isometric added today , Next : gentle flexion and abduction isometric.   Comments   Comments Follow [facility] Massive RCR Program   Total Session Time   Timed Code Treatment Minutes 37   Total Treatment Time (sum of timed and untimed services) 37           Referring Provider:  Larry Ellington

## 2024-05-13 ENCOUNTER — THERAPY VISIT (OUTPATIENT)
Dept: PHYSICAL THERAPY | Facility: CLINIC | Age: 59
End: 2024-05-13
Payer: COMMERCIAL

## 2024-05-13 DIAGNOSIS — M25.511 RIGHT SHOULDER PAIN: Primary | ICD-10-CM

## 2024-05-13 DIAGNOSIS — Z98.890 STATUS POST RIGHT ROTATOR CUFF REPAIR: ICD-10-CM

## 2024-05-13 PROCEDURE — 97112 NEUROMUSCULAR REEDUCATION: CPT | Mod: GP | Performed by: PHYSICAL THERAPIST

## 2024-05-13 PROCEDURE — 97110 THERAPEUTIC EXERCISES: CPT | Mod: GP | Performed by: PHYSICAL THERAPIST

## 2024-05-16 NOTE — PROGRESS NOTES
PLAN  Continue therapy per current plan of care.     05/13/24 0500   Appointment Info   Signing clinician's name / credentials Miguel Garber PT ATC Cert MDT   Total/Authorized Visits 12   Visits Used 5   Medical Diagnosis R RCR , SAD, Extensive Debridement   PT Tx Diagnosis R RCR, Scap dyskinesia, SAD, Pain   Precautions/Limitations [facility] post op Massive RCR Program   Other pertinent information 4sling x 12 weeks   Quick Adds Certification   Progress Note/Certification   Start of Care Date 03/11/24   Onset of illness/injury or Date of Surgery 12/07/23   Therapy Frequency 1x/week   Predicted Duration 12 weeks   Certification date from 03/11/24   Certification date to 06/03/24   Progress Note Due Date 06/04/24       Present No  (Spanish)   PT Goal 1   Goal Identifier reaching   Goal Description ability to actively reach above shoulder to 150 degrees   Rationale to maximize safety and independence with performance of ADLs and functional tasks;to maximize safety and independence within the home;to maximize safety and independence with self cares   Goal Progress AROM reach to 140-150 degrees with scap substitution   Target Date 06/03/24   Subjective Report   Subjective Report Loy is 14-15 week post rehab assignment. He is on a delayed program secondary to tissue healing, extent of surgery and duration of sling. Follow up with MD in 1 week. Slowly advancing ROM and function as planned. Patient notes of rooughly 70% recovered compared to the opposite side.   Objective Measures   Objective Measures Objective Measure 1;Objective Measure 2;Objective Measure 3;Objective Measure 4   Objective Measure 2   Objective Measure SPADI   Details 46/100   Objective Measure 3   Objective Measure shoulder ROM   Details Assisted Flexion 150, AROM flexion 140, , ER 50, IR 80   Objective Measure 4   Objective Measure Scap control   Details AROM scapular hike   PT Modalities   PT Modalities Cryotherapy   Cryotherapy    Patient Response/Progress will do at home   Treatment Interventions (PT)   Interventions Therapeutic Procedure/Exercise;Neuromuscular Re-education   Therapeutic Procedure/Exercise   Therapeutic Procedures: strength, endurance, ROM, flexibility minutes (02231) 23   Therapeutic Procedures Ther Proc 2;Ther Proc 3;Ther Proc 4;Ther Proc 5;Ther Proc 6;Ther Proc 7;Ther Proc 8;Ther Proc 9   Ther Proc 1 standing pendulum   Ther Proc 1 - Details 2x/day x 20 each   Ther Proc 2 ER Sidelying   Ther Proc 2 - Details 1x/day , every other day 2 x 30   Ther Proc 3 standing wall slides   Ther Proc 3 - Details 2x/day x 10   Skilled Intervention manual and verbal   Patient Response/Progress slight discomfort, no worse   Ther Proc 6 flexion isometric 5 sec hold x 10 ,   Ther Proc 6 - Details abduction isometric 5 sec hold x 10   Neuromuscular Re-education   Neuromuscular re-ed of mvmt, balance, coord, kinesthetic sense, posture, proprioception minutes (71198) 17   Neuromuscular Re-education Neuro Re-ed 2;Neuro Re-ed 3;Neuro Re-ed 4;Neuro Re-ed 5   Neuro Re-ed 1 scap retraction   Neuro Re-ed 1 - Details 2 x 10 , 5 sec hold   Neuro Re-ed 2 protraction   Neuro Re-ed 2 - Details 2 x 10, 5 sec hold   Neuro Re-ed 3 supine assisted ceiling punch   Neuro Re-ed 3 - Details 1x/day 2x 20, 5-10 ounce weight   Neuro Re-ed 4 supine reverse pendulum   Neuro Re-ed 4 - Details 2 x 20 5-10 ounce weight   Education   Learner/Method Patient;Demonstration;Pictures/Video;No Barriers to Learning   Plan   Home program [facility] print outs   Plan for next session stay the course with current. Follow up with MD.   Comments   Comments Follow [facility] Massive RCR Program   Total Session Time   Timed Code Treatment Minutes 40   Total Treatment Time (sum of timed and untimed services) 40           Referring Provider:  Larry Ellington

## 2024-06-13 ENCOUNTER — THERAPY VISIT (OUTPATIENT)
Dept: PHYSICAL THERAPY | Facility: CLINIC | Age: 59
End: 2024-06-13
Payer: COMMERCIAL

## 2024-06-13 DIAGNOSIS — M25.511 RIGHT SHOULDER PAIN: Primary | ICD-10-CM

## 2024-06-13 DIAGNOSIS — Z98.890 STATUS POST RIGHT ROTATOR CUFF REPAIR: ICD-10-CM

## 2024-06-13 PROCEDURE — 97110 THERAPEUTIC EXERCISES: CPT | Mod: GP | Performed by: PHYSICAL THERAPIST

## 2024-06-13 PROCEDURE — 97112 NEUROMUSCULAR REEDUCATION: CPT | Mod: GP | Performed by: PHYSICAL THERAPIST

## 2024-06-13 NOTE — PROGRESS NOTES
06/13/24 0500   Appointment Info   Signing clinician's name / credentials Miguel Garber PT ATC Cert MDT   Total/Authorized Visits 12   Visits Used 6   Medical Diagnosis R RCR , SAD, Extensive Debridement   PT Tx Diagnosis R RCR, Scap dyskinesia, SAD, Pain   Precautions/Limitations [facility] post op Massive RCR Program   Other pertinent information 4sling x 12 weeks   Quick Adds Certification   Progress Note/Certification   Start of Care Date 03/11/24   Onset of illness/injury or Date of Surgery 12/07/23   Therapy Frequency 1x/week   Predicted Duration 12 weeks   Certification date from 06/04/24   Certification date to 08/27/24   Progress Note Due Date 08/27/24   Progress Note Completed Date 06/13/24       Present No  (Mosotho)   GOALS   PT Goals 2   PT Goal 1   Goal Identifier reaching   Goal Description ability to actively reach above shoulder to 150 degrees   Rationale to maximize safety and independence with performance of ADLs and functional tasks;to maximize safety and independence within the home;to maximize safety and independence with self cares   Goal Progress AROM reach to 140-150 degrees with scap substitution   Target Date 06/03/24   Date Met 06/13/24   PT Goal 2   Goal Identifier lifting   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 He is on a delayed program secondary to tissue healing, extent of surgery and duration of sling. Follow up with MD in 1 week. Slowly advancing ROM and function as planned. Patient notes of rooughly 70% recovered compared to the opposite side.   Objective Measures   Objective Measures Objective Measure  1;Objective Measure 2;Objective Measure 3;Objective Measure 4   Objective Measure 1   Objective Measure ROM   Details ER 50 , IR 90 , 160 flexion   Objective Measure 2   Objective Measure SPADI   Details 4/100   Objective Measure 4   Objective Measure Scap control   Details AROM minimal scapular hike   PT Modalities   PT Modalities Cryotherapy   Cryotherapy   Patient Response/Progress will do at home   Treatment Interventions (PT)   Interventions Therapeutic Procedure/Exercise;Neuromuscular Re-education   Therapeutic Procedure/Exercise   Therapeutic Procedures: strength, endurance, ROM, flexibility minutes (04189) 20   Therapeutic Procedures Ther Proc 2;Ther Proc 3;Ther Proc 4;Ther Proc 5;Ther Proc 6;Ther Proc 7;Ther Proc 8;Ther Proc 9   Ther Proc 1 wand ER at 45 degrees abduction   Ther Proc 1 - Details 3 x 30 sec daily   Ther Proc 2 Towel IR   Ther Proc 2 - Details 3 x 30 sec , daily   Ther Proc 3 ceiling punch   Ther Proc 3 - Details 1-# weight 2 x 30-40 reps   Skilled Intervention manual and verbal   Patient Response/Progress slight discomfort, no worse   Neuromuscular Re-education   Neuromuscular re-ed of mvmt, balance, coord, kinesthetic sense, posture, proprioception minutes (17639) 15   Neuromuscular Re-education Neuro Re-ed 2;Neuro Re-ed 3;Neuro Re-ed 4;Neuro Re-ed 5   Neuro Re-ed 1 scap retraction   Neuro Re-ed 1 - Details 2 x 10 , 5 sec hold   Neuro Re-ed 2 protraction   Neuro Re-ed 2 - Details 2 x 10, 5 sec hold   Neuro Re-ed 3 wall [place]   Neuro Re-ed 3 - Details x 20 each way   Education   Learner/Method Patient;Demonstration;Pictures/Video;No Barriers to Learning   Plan   Home program [facility] print outs   Plan for next session Follow up with Dr. Ellington in September   Comments   Comments Consider DC PT   Total Session Time   Timed Code Treatment Minutes 35   Total Treatment Time (sum of timed and untimed services) 35     [facility]                                                                                    OUTPATIENT PHYSICAL THERAPY    PLAN OF TREATMENT FOR OUTPATIENT REHABILITATION   Patient's Last Name, First Name, Loy Adair YOB: 1965   Provider's Name   [facility]   Medical Record No.  [id number]     Onset Date: 12/07/23  Start of Care Date: 03/11/24     Medical Diagnosis:  R RCR , SAD, Extensive Debridement      PT Treatment Diagnosis:  R RCR, Scap dyskinesia, SAD, Pain Plan of Treatment  Frequency/Duration: 1x/week/ 12 weeks    Certification date from 06/04/24 to (P) 08/27/24         See note for plan of treatment details and functional goals     Taqueria Garber, PT                         I CERTIFY THE NEED FOR THESE SERVICES FURNISHED UNDER        THIS PLAN OF TREATMENT AND WHILE UNDER MY CARE .             Physician Signature               Date    X_____________________________________________________                  Referring Provider:  Larry Ellington    Initial Assessment  See Epic Evaluation- Start of Care Date: 03/11/24

## 2024-06-13 NOTE — PROGRESS NOTES
[facility]                                                                                   OUTPATIENT PHYSICAL THERAPY    PLAN OF TREATMENT FOR OUTPATIENT REHABILITATION   Patient's Last Name, First Name, Loy Adair YOB: 1965   Provider's Name   [facility]   Medical Record No.  [id number]     Onset Date: 12/07/23  Start of Care Date: 03/11/24     Medical Diagnosis:  R RCR , SAD, Extensive Debridement      PT Treatment Diagnosis:  R RCR, Scap dyskinesia, SAD, Pain Plan of Treatment  Frequency/Duration: 1x/week/ 12 weeks    Certification date from 06/04/24 to (P) 08/27/24         See note for plan of treatment details and functional goals     Taqueria Garber, PT                         I CERTIFY THE NEED FOR THESE SERVICES FURNISHED UNDER        THIS PLAN OF TREATMENT AND WHILE UNDER MY CARE .             Physician Signature               Date    X_____________________________________________________                  Referring Provider:  Larry Ellington    Initial Assessment  See Epic Evaluation- Start of Care Date: 03/11/24

## 2025-06-02 ENCOUNTER — OFFICE VISIT (OUTPATIENT)
Dept: FAMILY MEDICINE | Facility: CLINIC | Age: 60
End: 2025-06-02
Payer: COMMERCIAL

## 2025-06-02 VITALS
HEART RATE: 75 BPM | OXYGEN SATURATION: 99 % | BODY MASS INDEX: 34.63 KG/M2 | WEIGHT: 233.8 LBS | HEIGHT: 69 IN | RESPIRATION RATE: 16 BRPM | TEMPERATURE: 98.4 F | SYSTOLIC BLOOD PRESSURE: 116 MMHG | DIASTOLIC BLOOD PRESSURE: 82 MMHG

## 2025-06-02 DIAGNOSIS — H61.21 IMPACTED CERUMEN OF RIGHT EAR: Primary | ICD-10-CM

## 2025-06-02 PROCEDURE — 3079F DIAST BP 80-89 MM HG: CPT | Performed by: PHYSICIAN ASSISTANT

## 2025-06-02 PROCEDURE — 1125F AMNT PAIN NOTED PAIN PRSNT: CPT | Performed by: PHYSICIAN ASSISTANT

## 2025-06-02 PROCEDURE — 3074F SYST BP LT 130 MM HG: CPT | Performed by: PHYSICIAN ASSISTANT

## 2025-06-02 PROCEDURE — 69209 REMOVE IMPACTED EAR WAX UNI: CPT | Mod: RT | Performed by: PHYSICIAN ASSISTANT

## 2025-06-02 ASSESSMENT — PAIN SCALES - GENERAL: PAINLEVEL_OUTOF10: SEVERE PAIN (8)

## 2025-06-02 NOTE — PROGRESS NOTES
"  Assessment & Plan     (H61.21) Impacted cerumen of right ear  (primary encounter diagnosis)  Comment: Patient presents for evaluation of plugged sensation right ear for cerumen impaction on examination irrigated by staff.  Normal visualization of tympanic membrane following this.  Discussed with patient using Debrox eardrops as needed.  Advised discontinuing use of Q-tips  Plan: WI REMOVAL IMPACTED CERUMEN IRRIGATION/LVG         UNILAT          Video  services used intermittently during visit    Christos Granado is a 60 year old, presenting for the following health issues:  Plugged Ears      6/2/2025    10:17 AM   Additional Questions   Roomed by POORNIMA CARREON   Accompanied by SELF     History of Present Illness       Reason for visit:  Ear plugged   He is taking medications regularly.      For the last few days patient has had plugged sensation right ear.  Has had decreased hearing on this right side as well.  Does have a history of tinnitus at baseline but states this is different.  Does use Q-tips intermittently.  Denies any ear pain.      Review of Systems  Constitutional, HEENT, cardiovascular, pulmonary, gi and gu systems are negative, except as otherwise noted.      Objective    /82   Pulse 75   Temp 98.4  F (36.9  C) (Tympanic)   Resp 16   Ht 1.75 m (5' 8.9\")   Wt 106.1 kg (233 lb 12.8 oz)   SpO2 99%   BMI 34.63 kg/m    Body mass index is 34.63 kg/m .  Physical Exam   GENERAL: alert and no distress  HENT: normal cephalic/atraumatic, right ear: occluded with wax, left ear: normal: no effusions, no erythema, normal landmarks, nose and mouth without ulcers or lesions, oropharynx clear, and oral mucous membranes moist  RESP: lungs clear to auscultation - no rales, rhonchi or wheezes  CV: regular rates and rhythm and no murmur, click or rub  MS: no gross musculoskeletal defects noted, no edema            Signed Electronically by: Gopal Brown PA-C    "